# Patient Record
Sex: FEMALE | Race: WHITE | Employment: OTHER | URBAN - NONMETROPOLITAN AREA
[De-identification: names, ages, dates, MRNs, and addresses within clinical notes are randomized per-mention and may not be internally consistent; named-entity substitution may affect disease eponyms.]

---

## 2017-04-04 ENCOUNTER — HOSPITAL ENCOUNTER (OUTPATIENT)
Dept: WOMENS IMAGING | Age: 47
Discharge: HOME | End: 2017-04-04
Payer: COMMERCIAL

## 2017-04-04 DIAGNOSIS — Z12.31 VISIT FOR SCREENING MAMMOGRAM: ICD-10-CM

## 2017-04-04 PROCEDURE — G0202 SCR MAMMO BI INCL CAD: HCPCS

## 2017-04-05 ENCOUNTER — TELEPHONE (OUTPATIENT)
Dept: WOMENS IMAGING | Age: 47
End: 2017-04-05

## 2017-04-07 ENCOUNTER — HOSPITAL ENCOUNTER (OUTPATIENT)
Dept: ULTRASOUND IMAGING | Age: 47
Discharge: HOME | End: 2017-04-07
Payer: COMMERCIAL

## 2017-04-07 DIAGNOSIS — R92.2 INCONCLUSIVE MAMMOGRAM: ICD-10-CM

## 2017-04-07 PROCEDURE — 76642 ULTRASOUND BREAST LIMITED: CPT

## 2017-10-11 ENCOUNTER — TELEPHONE (OUTPATIENT)
Dept: UROLOGY | Age: 47
End: 2017-10-11

## 2017-10-13 ENCOUNTER — OFFICE VISIT (OUTPATIENT)
Dept: UROLOGY | Age: 47
End: 2017-10-13
Payer: COMMERCIAL

## 2017-10-13 VITALS
BODY MASS INDEX: 25.88 KG/M2 | TEMPERATURE: 97.3 F | HEIGHT: 66 IN | OXYGEN SATURATION: 97 % | HEART RATE: 103 BPM | WEIGHT: 161 LBS

## 2017-10-13 DIAGNOSIS — R30.0 DYSURIA: ICD-10-CM

## 2017-10-13 DIAGNOSIS — Z87.440 HISTORY OF RECURRENT UTIS: Primary | ICD-10-CM

## 2017-10-13 LAB
APPEARANCE FLUID: CLEAR
BILIRUBIN, POC: 0
BLOOD URINE, POC: NORMAL
CLARITY, POC: CLEAR
COLOR, POC: YELLOW
GLUCOSE URINE, POC: NORMAL
KETONES, POC: NORMAL
LEUKOCYTE EST, POC: NORMAL
NITRITE, POC: NORMAL
PH, POC: 6
PROTEIN, POC: NORMAL
SPECIFIC GRAVITY, POC: 1.02
UROBILINOGEN, POC: 0.2

## 2017-10-13 PROCEDURE — 81003 URINALYSIS AUTO W/O SCOPE: CPT | Performed by: PHYSICIAN ASSISTANT

## 2017-10-13 PROCEDURE — 99214 OFFICE O/P EST MOD 30 MIN: CPT | Performed by: PHYSICIAN ASSISTANT

## 2017-10-13 RX ORDER — PHENAZOPYRIDINE HYDROCHLORIDE 100 MG/1
100 TABLET, FILM COATED ORAL 3 TIMES DAILY PRN
Qty: 21 TABLET | Refills: 1 | Status: SHIPPED | OUTPATIENT
Start: 2017-10-13 | End: 2017-10-20

## 2017-10-13 ASSESSMENT — ENCOUNTER SYMPTOMS
NAUSEA: 0
WHEEZING: 0
SHORTNESS OF BREATH: 0
SORE THROAT: 0
DOUBLE VISION: 0
HEARTBURN: 0
BLURRED VISION: 0

## 2017-10-13 NOTE — PROGRESS NOTES
Mouth/Throat: No oropharyngeal exudate. Eyes: Left eye exhibits no discharge. No scleral icterus. Neck: No JVD present. No tracheal deviation present. No thyromegaly present. Cardiovascular: Exam reveals no gallop and no friction rub. Pulmonary/Chest: No stridor. She has no rales. Abdominal: She exhibits no distension and no mass. There is no rebound and no guarding. Musculoskeletal: She exhibits no edema. Neurological: No cranial nerve deficit. She exhibits normal muscle tone. Coordination normal.   Skin: She is not diaphoretic. No pallor. DATA:  U/A:    Lab Results   Component Value Date    NITRITE neg 10/13/2017    COLORU yellow 10/13/2017    PROTEINU neg' 10/13/2017    PROTEINU Negative 12/19/2016    PHUR 6.0 10/13/2017    PHUR 5.5 12/19/2016    CLARITYU clear 10/13/2017    SPECGRAV 1.025 10/13/2017    SPECGRAV 1.020 12/19/2016    LEUKOCYTESUR neg 10/13/2017    UROBILINOGEN Normal 12/19/2016    BILIRUBINUR 0 10/13/2017    BLOODU neg 10/13/2017    BLOODU Negative 12/19/2016    GLUCOSEU neg 10/13/2017    GLUCOSEU n 12/19/2016             1. History of recurrent UTIs  Patient normally takes postcoital antibiotics which has been effective for her recurrent UTIs. Feels that she has infection at this time we'll send urine for culture and sensitivity she had taken 1-2 date dose of 2 Macrobid daily. Patient follow up pending culture return in 2 months.  - POCT Urinalysis no Micro  - Urine Culture    2. Dysuria  Treated with Pyridium. Orders Placed This Encounter   Procedures    Urine Culture     Order Specific Question:   Specify (ex-cath, midstream, cysto, etc)?      Answer:   uti    POCT Urinalysis no Micro     Orders Placed This Encounter   Medications    phenazopyridine (PYRIDIUM) 100 MG tablet     Sig: Take 1 tablet by mouth 3 times daily as needed for Pain     Dispense:  21 tablet     Refill:  1     Plan:  Patient will return in 2 months or when necessary pending culture results.

## 2017-12-18 ENCOUNTER — OFFICE VISIT (OUTPATIENT)
Dept: UROLOGY | Age: 47
End: 2017-12-18
Payer: COMMERCIAL

## 2017-12-18 VITALS
HEIGHT: 66 IN | HEART RATE: 112 BPM | TEMPERATURE: 98 F | BODY MASS INDEX: 25.71 KG/M2 | DIASTOLIC BLOOD PRESSURE: 81 MMHG | SYSTOLIC BLOOD PRESSURE: 117 MMHG | WEIGHT: 160 LBS

## 2017-12-18 DIAGNOSIS — Z87.440 HISTORY OF RECURRENT UTIS: Primary | ICD-10-CM

## 2017-12-18 LAB
BACTERIA URINE, POC: NORMAL
BILIRUBIN URINE: 0 MG/DL
BLOOD, URINE: NEGATIVE
CASTS URINE, POC: NORMAL
CLARITY: NORMAL
COLOR: NORMAL
CRYSTALS URINE, POC: NORMAL
EPI CELLS URINE, POC: NORMAL
GLUCOSE URINE: NORMAL
KETONES, URINE: NEGATIVE
LEUKOCYTE EST, POC: NORMAL
NITRITE, URINE: NEGATIVE
PH UA: 6 (ref 4.5–8)
PROTEIN UA: NEGATIVE
RBC URINE, POC: NORMAL
SPECIFIC GRAVITY UA: 1.02 (ref 1–1.03)
UROBILINOGEN, URINE: NORMAL
WBC URINE, POC: NORMAL
YEAST URINE, POC: NORMAL

## 2017-12-18 PROCEDURE — 81000 URINALYSIS NONAUTO W/SCOPE: CPT | Performed by: NURSE PRACTITIONER

## 2017-12-18 PROCEDURE — 99212 OFFICE O/P EST SF 10 MIN: CPT | Performed by: NURSE PRACTITIONER

## 2017-12-18 ASSESSMENT — ENCOUNTER SYMPTOMS
NAUSEA: 0
EYE REDNESS: 0
EYE DISCHARGE: 0
WHEEZING: 0
HEARTBURN: 0
PHOTOPHOBIA: 0
DIARRHEA: 0
COUGH: 0
ABDOMINAL PAIN: 0
VOMITING: 0
TROUBLE SWALLOWING: 0
SHORTNESS OF BREATH: 0
CONSTIPATION: 0

## 2017-12-18 NOTE — PROGRESS NOTES
Radha Hartmann is a 41 y.o. female who presents today   Chief Complaint   Patient presents with    Follow-up     I am here today for my yearly check up. History of Recurrent UTI's:  Patient is here today for Recurrent UTI's follow up.  which started approximately 10 years(s) ago. The last infection was  2 month(s) ago. The patient has had 2 infections the past year. During the infections, the patient has experienced the following symptoms: dysuria, frequency, urgency, suprapubic pressure  Fever and chills? no fever or chills  Related to sexually intercourse? Yes  Previous urine C&S results: in the past  Current Rx for recurrent UTI's: Macrobid (postcoital)  Previous treatments tried for UTI's: macrobid  Symptoms improve with treatment: yes    Lower urinary tract symptoms: None currently          Past Medical History:   Diagnosis Date    Asthma     Cervical disc displacement     para-cervical disc protrusion c5-c6    Chronic UTI (urinary tract infection)     Eczema     Elevated white blood cell count     Endometriosis     Fibromyalgia     Hyperlipidemia     Hypertriglyceridemia     IBS (irritable bowel syndrome)     Meniere disease     Migraine     Rapid heart beat     Sciatica        Past Surgical History:   Procedure Laterality Date    CHOLECYSTECTOMY      ENDOMETRIAL ABLATION      OTHER SURGICAL HISTORY      cyst near tailbone removed       Current Outpatient Prescriptions   Medication Sig Dispense Refill    folic acid (FOLVITE) 1 MG tablet       vitamin D (CHOLECALCIFEROL) 1000 UNIT TABS tablet Take 1,000 Units by mouth daily      Multiple Vitamins-Minerals (THERAPEUTIC MULTIVITAMIN-MINERALS) tablet Take 1 tablet by mouth daily      nitrofurantoin (MACRODANTIN) 100 MG capsule Take 1 capsule by mouth nightly 30 capsule 5    zolpidem (AMBIEN) 10 MG tablet Take 10 mg by mouth nightly as needed.  imipramine (TOFRANIL) 25 MG tablet Take 25 mg by mouth nightly.         vitamin B-12 (CYANOCOBALAMIN) 1000 MCG tablet Take 1,000 mcg by mouth daily.  metformin (GLUCOPHAGE) 500 MG tablet Take 500 mg by mouth 2 times daily (with meals).  ALBUTEROL IN Inhale  into the lungs.  cyclobenzaprine (FLEXERIL) 10 MG tablet Take 10 mg by mouth 3 times daily as needed. No current facility-administered medications for this visit. Allergies   Allergen Reactions    Actifed [Triprolidine-Pse]     Codimal Dh     Levaquin [Levofloxacin In D5w] Itching    Percocet [Oxycodone-Acetaminophen]        Social History     Social History    Marital status:      Spouse name: N/A    Number of children: N/A    Years of education: N/A     Social History Main Topics    Smoking status: Never Smoker    Smokeless tobacco: Never Used    Alcohol use No    Drug use: No    Sexual activity: Not Asked     Other Topics Concern    None     Social History Narrative    None       Family History   Problem Relation Age of Onset    Cancer Mother      breast    Heart Disease Father     Arthritis Other        REVIEW OF SYSTEMS:  Review of Systems   Constitutional: Negative for chills, fatigue and fever. HENT: Negative for hearing loss and trouble swallowing. Eyes: Negative for photophobia, discharge and visual disturbance. Respiratory: Negative for cough, shortness of breath and wheezing. Cardiovascular: Negative for chest pain and palpitations. Gastrointestinal: Negative for abdominal pain, constipation, diarrhea and vomiting. Endocrine: Negative for polydipsia and polyuria. Genitourinary: Negative for decreased urine volume, difficulty urinating, dyspareunia, dysuria, enuresis, flank pain, frequency, hematuria, pelvic pain, urgency, vaginal discharge and vaginal pain. Musculoskeletal: Negative for joint swelling, neck pain and neck stiffness. Skin: Negative for rash and wound. Allergic/Immunologic: Negative for environmental allergies and food allergies. Urine Negative     pH, UA 6.0 4.5 - 8.0    Protein, UA Negative Negative    Nitrite, Urine Negative     Leukocytes, UA Small     Urobilinogen, Urine Normal     rbc urine, poc      wbc urine, poc      bacteria urine, poc      yeast urine, poc      casts urine, poc      epi cells urine, poc      crystals urine, poc     wbc microscope. ..... 2    Problem List Items Addressed This Visit     History of recurrent UTIs - Primary    Relevant Orders    POC URINE with Microscopic (Completed)      Other Visit Diagnoses    None. Orders Placed This Encounter   Procedures    POC URINE with Microscopic        No Follow-up on file.

## 2017-12-18 NOTE — PROGRESS NOTES
Rocio Ho is a 41 y.o. female who presents today   Chief Complaint   Patient presents with    Follow-up     I am here today for my yearly check up. Patient presents with her 3 month follow up for recurrent uti related to intercourse. Past Medical History:   Diagnosis Date    Asthma     Cervical disc displacement     para-cervical disc protrusion c5-c6    Chronic UTI (urinary tract infection)     Eczema     Elevated white blood cell count     Endometriosis     Fibromyalgia     Hyperlipidemia     Hypertriglyceridemia     IBS (irritable bowel syndrome)     Meniere disease     Migraine     Rapid heart beat     Sciatica        Past Surgical History:   Procedure Laterality Date    CHOLECYSTECTOMY      ENDOMETRIAL ABLATION      OTHER SURGICAL HISTORY      cyst near tailbone removed       Current Outpatient Prescriptions   Medication Sig Dispense Refill    folic acid (FOLVITE) 1 MG tablet       vitamin D (CHOLECALCIFEROL) 1000 UNIT TABS tablet Take 1,000 Units by mouth daily      Multiple Vitamins-Minerals (THERAPEUTIC MULTIVITAMIN-MINERALS) tablet Take 1 tablet by mouth daily      nitrofurantoin (MACRODANTIN) 100 MG capsule Take 1 capsule by mouth nightly 30 capsule 5    zolpidem (AMBIEN) 10 MG tablet Take 10 mg by mouth nightly as needed.  imipramine (TOFRANIL) 25 MG tablet Take 25 mg by mouth nightly.  vitamin B-12 (CYANOCOBALAMIN) 1000 MCG tablet Take 1,000 mcg by mouth daily.  metformin (GLUCOPHAGE) 500 MG tablet Take 500 mg by mouth 2 times daily (with meals).  ALBUTEROL IN Inhale  into the lungs.  cyclobenzaprine (FLEXERIL) 10 MG tablet Take 10 mg by mouth 3 times daily as needed. No current facility-administered medications for this visit.         Allergies   Allergen Reactions    Actifed [Triprolidine-Pse]     Codimal Dh     Levaquin [Levofloxacin In D5w] Itching    Percocet [Oxycodone-Acetaminophen]        Social History     Social History    Marital status:      Spouse name: N/A    Number of children: N/A    Years of education: N/A     Social History Main Topics    Smoking status: Never Smoker    Smokeless tobacco: Never Used    Alcohol use No    Drug use: No    Sexual activity: Not Asked     Other Topics Concern    None     Social History Narrative    None       Family History   Problem Relation Age of Onset    Cancer Mother      breast    Heart Disease Father     Arthritis Other        REVIEW OF SYSTEMS:  Review of Systems   Constitutional: Negative for chills and fever. HENT: Negative for hearing loss. Eyes: Negative for discharge and redness. Respiratory: Negative for shortness of breath and wheezing. Cardiovascular: Negative for leg swelling and PND. Gastrointestinal: Negative for heartburn and nausea. Genitourinary: Negative for dysuria, flank pain, frequency, hematuria and urgency. Musculoskeletal: Negative for myalgias and neck pain. Skin: Negative for itching and rash. Neurological: Negative for seizures, loss of consciousness and headaches. Endo/Heme/Allergies: Negative for environmental allergies and polydipsia. Psychiatric/Behavioral: Negative for depression and suicidal ideas. PHYSICAL EXAM:  /81   Pulse 112   Temp 98 °F (36.7 °C) (Temporal)   Ht 5' 6\" (1.676 m)   Wt 160 lb (72.6 kg)   BMI 25.82 kg/m²   Physical Exam   Constitutional: She is oriented to person, place, and time. She appears well-developed and well-nourished. No distress. HENT:   Head: Normocephalic and atraumatic. Eyes: Conjunctivae are normal. Pupils are equal, round, and reactive to light. Neck: Neck supple. No JVD present. No thyromegaly present. Cardiovascular: Normal rate and regular rhythm. Exam reveals no friction rub. No murmur heard. Pulmonary/Chest: Breath sounds normal. No respiratory distress. She has no wheezes. Abdominal: Soft. She exhibits no distension.  There is no

## 2017-12-22 RX ORDER — NITROFURANTOIN MACROCRYSTALS 100 MG/1
100 CAPSULE ORAL NIGHTLY
Qty: 30 CAPSULE | Refills: 5 | Status: SHIPPED | OUTPATIENT
Start: 2017-12-22 | End: 2018-08-12 | Stop reason: ALTCHOICE

## 2018-01-11 ENCOUNTER — OFFICE VISIT (OUTPATIENT)
Dept: OBGYN | Age: 48
End: 2018-01-11
Payer: COMMERCIAL

## 2018-01-11 VITALS
HEIGHT: 66 IN | DIASTOLIC BLOOD PRESSURE: 77 MMHG | WEIGHT: 156 LBS | BODY MASS INDEX: 25.07 KG/M2 | SYSTOLIC BLOOD PRESSURE: 108 MMHG | HEART RATE: 106 BPM

## 2018-01-11 DIAGNOSIS — Z01.419 WELL WOMAN EXAM WITH ROUTINE GYNECOLOGICAL EXAM: Primary | ICD-10-CM

## 2018-01-11 DIAGNOSIS — Z12.31 ENCOUNTER FOR SCREENING MAMMOGRAM FOR BREAST CANCER: ICD-10-CM

## 2018-01-11 DIAGNOSIS — Z12.4 SCREENING FOR CERVICAL CANCER: ICD-10-CM

## 2018-01-11 PROCEDURE — 99386 PREV VISIT NEW AGE 40-64: CPT | Performed by: NURSE PRACTITIONER

## 2018-01-11 ASSESSMENT — ENCOUNTER SYMPTOMS
EYES NEGATIVE: 1
ALLERGIC/IMMUNOLOGIC NEGATIVE: 1
GASTROINTESTINAL NEGATIVE: 1
RESPIRATORY NEGATIVE: 1

## 2018-01-11 NOTE — PROGRESS NOTES
 OTHER SURGICAL HISTORY      cyst near tailbone removed     Family History   Problem Relation Age of Onset    Cancer Mother      breast    Heart Disease Father     Arthritis Other      Social History   Substance Use Topics    Smoking status: Never Smoker    Smokeless tobacco: Never Used    Alcohol use No       Review of Systems   Constitutional: Negative. HENT: Negative. Eyes: Negative. Respiratory: Negative. Cardiovascular: Negative. Gastrointestinal: Negative. Endocrine: Negative. Genitourinary: Negative. Musculoskeletal: Negative. Skin: Negative. Allergic/Immunologic: Negative. Neurological: Negative. Hematological: Negative. Psychiatric/Behavioral: Negative. Objective:   Physical Exam   Constitutional: She is oriented to person, place, and time. She appears well-developed and well-nourished. Eyes: Conjunctivae are normal. Right eye exhibits no discharge. Neck: No thyroid mass and no thyromegaly present. Cardiovascular: Normal rate, regular rhythm and normal heart sounds. No murmur heard. Pulmonary/Chest: Effort normal and breath sounds normal. She has no wheezes. Right breast exhibits no inverted nipple, no mass, no nipple discharge, no skin change and no tenderness. Left breast exhibits no inverted nipple, no mass, no nipple discharge, no skin change and no tenderness. Breasts are symmetrical.   Abdominal: Soft. Bowel sounds are normal. She exhibits no distension and no mass. There is no tenderness. Hernia confirmed negative in the right inguinal area and confirmed negative in the left inguinal area. Genitourinary: Rectal exam shows no external hemorrhoid. No breast swelling, tenderness or discharge. There is no rash, tenderness or lesion on the right labia. There is no rash, tenderness or lesion on the left labia. Uterus is not enlarged and not tender. Cervix exhibits no motion tenderness and no discharge (normal cervical mucosa).  Right adnexum

## 2018-04-17 ENCOUNTER — OFFICE VISIT (OUTPATIENT)
Dept: UROLOGY | Age: 48
End: 2018-04-17
Payer: COMMERCIAL

## 2018-04-17 VITALS
SYSTOLIC BLOOD PRESSURE: 147 MMHG | DIASTOLIC BLOOD PRESSURE: 84 MMHG | TEMPERATURE: 97.2 F | WEIGHT: 157 LBS | BODY MASS INDEX: 25.23 KG/M2 | HEIGHT: 66 IN | HEART RATE: 105 BPM

## 2018-04-17 DIAGNOSIS — R10.2 SUPRAPUBIC PAIN: Primary | ICD-10-CM

## 2018-04-17 DIAGNOSIS — N39.0 URINARY TRACT INFECTION WITHOUT HEMATURIA, SITE UNSPECIFIED: ICD-10-CM

## 2018-04-17 LAB
BACTERIA URINE, POC: ABNORMAL
BILIRUBIN URINE: 0 MG/DL
BLOOD, URINE: POSITIVE
CASTS URINE, POC: ABNORMAL
CLARITY: CLEAR
COLOR: YELLOW
CRYSTALS URINE, POC: ABNORMAL
EPI CELLS URINE, POC: ABNORMAL
GLUCOSE URINE: ABNORMAL
KETONES, URINE: NEGATIVE
LEUKOCYTE EST, POC: ABNORMAL
NITRITE, URINE: POSITIVE
PH UA: 5.5 (ref 4.5–8)
PROTEIN UA: NEGATIVE
RBC URINE, POC: ABNORMAL
SPECIFIC GRAVITY UA: 1.03 (ref 1–1.03)
UROBILINOGEN, URINE: NORMAL
WBC URINE, POC: ABNORMAL
YEAST URINE, POC: ABNORMAL

## 2018-04-17 PROCEDURE — 99214 OFFICE O/P EST MOD 30 MIN: CPT | Performed by: PHYSICIAN ASSISTANT

## 2018-04-17 PROCEDURE — 81001 URINALYSIS AUTO W/SCOPE: CPT | Performed by: PHYSICIAN ASSISTANT

## 2018-04-17 RX ORDER — SULFAMETHOXAZOLE AND TRIMETHOPRIM 800; 160 MG/1; MG/1
1 TABLET ORAL 2 TIMES DAILY
Qty: 20 TABLET | Refills: 0 | Status: SHIPPED | OUTPATIENT
Start: 2018-04-17 | End: 2018-04-27

## 2018-04-17 RX ORDER — LETROZOLE 2.5 MG/1
TABLET, FILM COATED ORAL
COMMUNITY
Start: 2018-04-16 | End: 2018-08-12 | Stop reason: ALTCHOICE

## 2018-04-17 ASSESSMENT — ENCOUNTER SYMPTOMS
ABDOMINAL PAIN: 0
BLURRED VISION: 0
SHORTNESS OF BREATH: 0
SINUS PAIN: 0
BACK PAIN: 0
EYE PAIN: 0
WHEEZING: 0
VOMITING: 0

## 2018-04-19 LAB
ORGANISM: ABNORMAL
URINE CULTURE, ROUTINE: ABNORMAL
URINE CULTURE, ROUTINE: ABNORMAL

## 2018-05-02 ENCOUNTER — OFFICE VISIT (OUTPATIENT)
Dept: UROLOGY | Age: 48
End: 2018-05-02
Payer: COMMERCIAL

## 2018-05-02 VITALS
HEART RATE: 109 BPM | HEIGHT: 66 IN | DIASTOLIC BLOOD PRESSURE: 73 MMHG | WEIGHT: 158 LBS | SYSTOLIC BLOOD PRESSURE: 114 MMHG | BODY MASS INDEX: 25.39 KG/M2

## 2018-05-02 DIAGNOSIS — N39.0 URINARY TRACT INFECTION WITHOUT HEMATURIA, SITE UNSPECIFIED: Primary | ICD-10-CM

## 2018-05-02 LAB
APPEARANCE FLUID: NORMAL
BILIRUBIN, POC: NORMAL
BLOOD URINE, POC: NORMAL
CLARITY, POC: CLEAR
COLOR, POC: YELLOW
GLUCOSE URINE, POC: NORMAL
KETONES, POC: NORMAL
LEUKOCYTE EST, POC: NORMAL
NITRITE, POC: NORMAL
PH, POC: 5.5
PROTEIN, POC: NORMAL
SPECIFIC GRAVITY, POC: 1.02
UROBILINOGEN, POC: 0.2

## 2018-05-02 PROCEDURE — 81003 URINALYSIS AUTO W/O SCOPE: CPT | Performed by: PHYSICIAN ASSISTANT

## 2018-05-02 PROCEDURE — 99213 OFFICE O/P EST LOW 20 MIN: CPT | Performed by: PHYSICIAN ASSISTANT

## 2018-05-02 ASSESSMENT — ENCOUNTER SYMPTOMS
HEARTBURN: 0
DOUBLE VISION: 0
SHORTNESS OF BREATH: 0
WHEEZING: 0
SORE THROAT: 0
BLURRED VISION: 0
NAUSEA: 0

## 2018-07-19 ENCOUNTER — HOSPITAL ENCOUNTER (OUTPATIENT)
Dept: WOMENS IMAGING | Age: 48
Discharge: HOME | End: 2018-07-19
Payer: COMMERCIAL

## 2018-07-19 DIAGNOSIS — Z12.31 ENCOUNTER FOR SCREENING MAMMOGRAM FOR BREAST CANCER: ICD-10-CM

## 2018-07-19 PROCEDURE — 77063 BREAST TOMOSYNTHESIS BI: CPT

## 2018-08-12 ENCOUNTER — HOSPITAL ENCOUNTER (EMERGENCY)
Age: 48
Discharge: HOME | End: 2018-08-12
Attending: EMERGENCY MEDICINE
Payer: COMMERCIAL

## 2018-08-12 ENCOUNTER — APPOINTMENT (OUTPATIENT)
Dept: CT IMAGING | Age: 48
End: 2018-08-12
Payer: COMMERCIAL

## 2018-08-12 ENCOUNTER — APPOINTMENT (OUTPATIENT)
Dept: GENERAL RADIOLOGY | Age: 48
End: 2018-08-12
Payer: COMMERCIAL

## 2018-08-12 VITALS
OXYGEN SATURATION: 100 % | BODY MASS INDEX: 24.91 KG/M2 | HEART RATE: 84 BPM | SYSTOLIC BLOOD PRESSURE: 144 MMHG | TEMPERATURE: 97.9 F | HEIGHT: 66 IN | RESPIRATION RATE: 20 BRPM | WEIGHT: 155 LBS | DIASTOLIC BLOOD PRESSURE: 86 MMHG

## 2018-08-12 DIAGNOSIS — R79.89 ELEVATED LFTS: ICD-10-CM

## 2018-08-12 DIAGNOSIS — R10.13 ABDOMINAL PAIN, EPIGASTRIC: Primary | ICD-10-CM

## 2018-08-12 DIAGNOSIS — R91.1 PULMONARY NODULE SEEN ON IMAGING STUDY: ICD-10-CM

## 2018-08-12 LAB
ALBUMIN SERPL-MCNC: 4.3 G/DL (ref 3.5–5.2)
ALP BLD-CCNC: 78 U/L (ref 35–104)
ALT SERPL-CCNC: 107 U/L (ref 5–33)
ANION GAP SERPL CALCULATED.3IONS-SCNC: 16 MMOL/L (ref 7–19)
AST SERPL-CCNC: 264 U/L (ref 5–32)
BACTERIA: NEGATIVE /HPF
BASOPHILS ABSOLUTE: 0.1 K/UL (ref 0–0.2)
BASOPHILS RELATIVE PERCENT: 0.7 % (ref 0–1)
BILIRUB SERPL-MCNC: 0.4 MG/DL (ref 0.2–1.2)
BILIRUBIN URINE: NEGATIVE
BLOOD, URINE: NEGATIVE
BUN BLDV-MCNC: 17 MG/DL (ref 6–20)
CALCIUM SERPL-MCNC: 9.7 MG/DL (ref 8.6–10)
CHLORIDE BLD-SCNC: 98 MMOL/L (ref 98–111)
CLARITY: CLEAR
CO2: 23 MMOL/L (ref 22–29)
COLOR: YELLOW
CREAT SERPL-MCNC: 0.6 MG/DL (ref 0.5–0.9)
EOSINOPHILS ABSOLUTE: 0.2 K/UL (ref 0–0.6)
EOSINOPHILS RELATIVE PERCENT: 0.9 % (ref 0–5)
EPITHELIAL CELLS, UA: 6 /HPF (ref 0–5)
GFR NON-AFRICAN AMERICAN: >60
GLUCOSE BLD-MCNC: 102 MG/DL (ref 74–109)
GLUCOSE URINE: NEGATIVE MG/DL
HCG(URINE) PREGNANCY TEST: NEGATIVE
HCT VFR BLD CALC: 39.3 % (ref 37–47)
HEMOGLOBIN: 13.4 G/DL (ref 12–16)
HYALINE CASTS: 0 /HPF (ref 0–8)
INR BLD: 0.88 (ref 0.88–1.18)
KETONES, URINE: NEGATIVE MG/DL
LEUKOCYTE ESTERASE, URINE: ABNORMAL
LIPASE: 38 U/L (ref 13–60)
LYMPHOCYTES ABSOLUTE: 3.7 K/UL (ref 1.1–4.5)
LYMPHOCYTES RELATIVE PERCENT: 21.6 % (ref 20–40)
MCH RBC QN AUTO: 31.2 PG (ref 27–31)
MCHC RBC AUTO-ENTMCNC: 34.1 G/DL (ref 33–37)
MCV RBC AUTO: 91.4 FL (ref 81–99)
MONOCYTES ABSOLUTE: 1 K/UL (ref 0–0.9)
MONOCYTES RELATIVE PERCENT: 6.2 % (ref 0–10)
NEUTROPHILS ABSOLUTE: 11.8 K/UL (ref 1.5–7.5)
NEUTROPHILS RELATIVE PERCENT: 69.9 % (ref 50–65)
NITRITE, URINE: NEGATIVE
PDW BLD-RTO: 11.8 % (ref 11.5–14.5)
PH UA: 7
PLATELET # BLD: 400 K/UL (ref 130–400)
PMV BLD AUTO: 9.2 FL (ref 9.4–12.3)
POTASSIUM SERPL-SCNC: 4.1 MMOL/L (ref 3.5–5)
PROTEIN UA: NEGATIVE MG/DL
PROTHROMBIN TIME: 11.8 SEC (ref 12–14.6)
RBC # BLD: 4.3 M/UL (ref 4.2–5.4)
RBC UA: 2 /HPF (ref 0–4)
SODIUM BLD-SCNC: 137 MMOL/L (ref 136–145)
SPECIFIC GRAVITY UA: 1.01
TOTAL PROTEIN: 7.5 G/DL (ref 6.6–8.7)
TROPONIN: <0.01 NG/ML (ref 0–0.03)
TROPONIN: <0.01 NG/ML (ref 0–0.03)
URINE REFLEX TO CULTURE: YES
UROBILINOGEN, URINE: 0.2 E.U./DL
WBC # BLD: 16.9 K/UL (ref 4.8–10.8)
WBC UA: 9 /HPF (ref 0–5)

## 2018-08-12 PROCEDURE — 6360000004 HC RX CONTRAST MEDICATION: Performed by: EMERGENCY MEDICINE

## 2018-08-12 PROCEDURE — 96375 TX/PRO/DX INJ NEW DRUG ADDON: CPT

## 2018-08-12 PROCEDURE — 6360000002 HC RX W HCPCS: Performed by: EMERGENCY MEDICINE

## 2018-08-12 PROCEDURE — 96374 THER/PROPH/DIAG INJ IV PUSH: CPT

## 2018-08-12 PROCEDURE — 36415 COLL VENOUS BLD VENIPUNCTURE: CPT

## 2018-08-12 PROCEDURE — 81025 URINE PREGNANCY TEST: CPT

## 2018-08-12 PROCEDURE — 85025 COMPLETE CBC W/AUTO DIFF WBC: CPT

## 2018-08-12 PROCEDURE — 6370000000 HC RX 637 (ALT 250 FOR IP): Performed by: EMERGENCY MEDICINE

## 2018-08-12 PROCEDURE — 85610 PROTHROMBIN TIME: CPT

## 2018-08-12 PROCEDURE — 99284 EMERGENCY DEPT VISIT MOD MDM: CPT | Performed by: EMERGENCY MEDICINE

## 2018-08-12 PROCEDURE — C9113 INJ PANTOPRAZOLE SODIUM, VIA: HCPCS | Performed by: EMERGENCY MEDICINE

## 2018-08-12 PROCEDURE — 81001 URINALYSIS AUTO W/SCOPE: CPT

## 2018-08-12 PROCEDURE — 2580000003 HC RX 258: Performed by: EMERGENCY MEDICINE

## 2018-08-12 PROCEDURE — 87086 URINE CULTURE/COLONY COUNT: CPT

## 2018-08-12 PROCEDURE — 71045 X-RAY EXAM CHEST 1 VIEW: CPT

## 2018-08-12 PROCEDURE — 93005 ELECTROCARDIOGRAM TRACING: CPT

## 2018-08-12 PROCEDURE — 83690 ASSAY OF LIPASE: CPT

## 2018-08-12 PROCEDURE — 99285 EMERGENCY DEPT VISIT HI MDM: CPT

## 2018-08-12 PROCEDURE — 84484 ASSAY OF TROPONIN QUANT: CPT

## 2018-08-12 PROCEDURE — 74177 CT ABD & PELVIS W/CONTRAST: CPT

## 2018-08-12 PROCEDURE — 87186 SC STD MICRODIL/AGAR DIL: CPT

## 2018-08-12 PROCEDURE — 87077 CULTURE AEROBIC IDENTIFY: CPT

## 2018-08-12 PROCEDURE — 80053 COMPREHEN METABOLIC PANEL: CPT

## 2018-08-12 RX ORDER — PROMETHAZINE HYDROCHLORIDE 25 MG/ML
12.5 INJECTION, SOLUTION INTRAMUSCULAR; INTRAVENOUS ONCE
Status: COMPLETED | OUTPATIENT
Start: 2018-08-12 | End: 2018-08-12

## 2018-08-12 RX ORDER — MORPHINE SULFATE 1 MG/ML
4 INJECTION, SOLUTION EPIDURAL; INTRATHECAL; INTRAVENOUS ONCE
Status: COMPLETED | OUTPATIENT
Start: 2018-08-12 | End: 2018-08-12

## 2018-08-12 RX ORDER — SUCRALFATE 1 G/1
1 TABLET ORAL ONCE
Status: COMPLETED | OUTPATIENT
Start: 2018-08-12 | End: 2018-08-12

## 2018-08-12 RX ORDER — PANTOPRAZOLE SODIUM 40 MG/10ML
40 INJECTION, POWDER, LYOPHILIZED, FOR SOLUTION INTRAVENOUS ONCE
Status: COMPLETED | OUTPATIENT
Start: 2018-08-12 | End: 2018-08-12

## 2018-08-12 RX ORDER — ONDANSETRON 2 MG/ML
4 INJECTION INTRAMUSCULAR; INTRAVENOUS ONCE
Status: COMPLETED | OUTPATIENT
Start: 2018-08-12 | End: 2018-08-12

## 2018-08-12 RX ORDER — PROMETHAZINE HYDROCHLORIDE 25 MG/ML
12.5 INJECTION, SOLUTION INTRAMUSCULAR; INTRAVENOUS ONCE
Status: DISCONTINUED | OUTPATIENT
Start: 2018-08-12 | End: 2018-08-12

## 2018-08-12 RX ORDER — 0.9 % SODIUM CHLORIDE 0.9 %
1000 INTRAVENOUS SOLUTION INTRAVENOUS ONCE
Status: COMPLETED | OUTPATIENT
Start: 2018-08-12 | End: 2018-08-12

## 2018-08-12 RX ADMIN — Medication 4 MG: at 06:26

## 2018-08-12 RX ADMIN — SUCRALFATE 1 G: 1 TABLET ORAL at 07:00

## 2018-08-12 RX ADMIN — PROMETHAZINE HYDROCHLORIDE 12.5 MG: 25 INJECTION INTRAMUSCULAR; INTRAVENOUS at 07:11

## 2018-08-12 RX ADMIN — PANTOPRAZOLE SODIUM 40 MG: 40 INJECTION, POWDER, FOR SOLUTION INTRAVENOUS at 05:32

## 2018-08-12 RX ADMIN — SODIUM CHLORIDE 1000 ML: 9 INJECTION, SOLUTION INTRAVENOUS at 05:33

## 2018-08-12 RX ADMIN — Medication 4 MG: at 05:24

## 2018-08-12 RX ADMIN — ONDANSETRON 4 MG: 2 INJECTION INTRAMUSCULAR; INTRAVENOUS at 05:24

## 2018-08-12 RX ADMIN — IOPAMIDOL 90 ML: 755 INJECTION, SOLUTION INTRAVENOUS at 07:40

## 2018-08-12 RX ADMIN — LIDOCAINE HYDROCHLORIDE: 20 SOLUTION ORAL; TOPICAL at 05:25

## 2018-08-12 ASSESSMENT — PAIN SCALES - GENERAL
PAINLEVEL_OUTOF10: 9
PAINLEVEL_OUTOF10: 3
PAINLEVEL_OUTOF10: 7
PAINLEVEL_OUTOF10: 7

## 2018-08-12 ASSESSMENT — ENCOUNTER SYMPTOMS
BACK PAIN: 0
ABDOMINAL PAIN: 1
SHORTNESS OF BREATH: 0

## 2018-08-12 NOTE — ED PROVIDER NOTES
[facility]  eMERGENCY dEPARTMENT eNCOUnter      Pt Name: Florian Gates  MRN: [id number]  [place] 1970  Date of evaluation: 8/12/2018  Provider: Rosario George MD    CHIEF COMPLAINT       Chief Complaint   Patient presents with    Chest Pain         HISTORY OF PRESENT ILLNESS   (Location/Symptom, Timing/Onset, Context/Setting, Quality, Duration, Modifying Factors, Severity)  Note limiting factors. Florian Gates is a 41 y.o. female who presents to the emergency department with lower esophageal spasm and pain. 0130 prior to going to bed. Drank water 4 glasses. Ate at 1900. Hx of esophageal spasms, feels like things sit on stomach. Severe intermittent epigastric pain, hx of choleycystectomy in 1999. Chronic issues with BMs and constipation and slow gastric transit. No food bolus impaction hx. The history is provided by the patient and the spouse. Nursing Notes were reviewed. REVIEW OF SYSTEMS    (2-9 systems for level 4, 10 or more for level 5)     Review of Systems   Constitutional: Negative for fever. Respiratory: Negative for shortness of breath. Cardiovascular: Positive for chest pain. Gastrointestinal: Positive for abdominal pain. Genitourinary: Negative for dysuria. Musculoskeletal: Negative for back pain. Neurological: Negative for headaches. Psychiatric/Behavioral: Negative for confusion. A complete review of systems was performed and is negative except as noted above in the HPI.        PAST MEDICAL HISTORY     Past Medical History:   Diagnosis Date    Asthma     Cervical disc displacement     para-cervical disc protrusion c5-c6    Chronic UTI (urinary tract infection)     Eczema     Elevated white blood cell count     Endometriosis     Fibromyalgia     Hyperlipidemia     Hypertriglyceridemia     IBS (irritable bowel syndrome)     Meniere disease     Migraine     Rapid heart beat     Sciatica          SURGICAL HISTORY       Past Surgical

## 2018-08-12 NOTE — ED PROVIDER NOTES
[facility]  eMERGENCY dEPARTMENT eNCOUnter      Pt Name: Venancio Ellington  MRN: [id number]  [place] 1970  Date of evaluation: 8/12/2018  Provider: Mey Villalba MD    Emergency Department care of this patient was assumed at 0700 from Dr. Theresa Carty. We have discussed the case and the plan of care. I have seen and evaluated patient and reviewed ED course. CHIEF COMPLAINT       Chief Complaint   Patient presents with    Chest Pain     Patient currently awaiting CT scan of the abdomen for further evaluation of symptoms. PHYSICAL EXAM    (up to 7 for level 4, 8 or more for level 5)     ED Triage Vitals   BP Temp Temp Source Pulse Resp SpO2 Height Weight   08/12/18 0437 08/12/18 0432 08/12/18 0432 08/12/18 0437 08/12/18 0432 08/12/18 0437 08/12/18 0432 08/12/18 0432   (!) 138/103 97.9 °F (36.6 °C) Oral 115 24 100 % 5' 6\" (1.676 m) 155 lb (70.3 kg)       Physical Exam    DIAGNOSTIC RESULTS       RADIOLOGY:   Non-plain film images such as CT, Ultrasound and MRI are read by the radiologist. Plain radiographic images are visualized and preliminarily interpreted by the emergency physician with the below findings:      CT ABDOMEN PELVIS W IV CONTRAST Additional Contrast? Oral   Final Result   Impression:   1. No acute findings in the abdomen or pelvis. 2. Atherosclerotic disease. Signed by Dr Charly Kraft on 8/12/2018 8:05 AM      XR CHEST PORTABLE   Final Result   No evidence of acute cardiopulmonary process. Questionable   nodule in the right upper lung field for which nonemergent CT chest is   recommended.    Signed by Dr Charly Kraft on 8/12/2018 7:48 AM              LABS:  Labs Reviewed   COMPREHENSIVE METABOLIC PANEL - Abnormal; Notable for the following:        Result Value     (*)      (*)     All other components within normal limits   CBC WITH AUTO DIFFERENTIAL - Abnormal; Notable for the following:     WBC 16.9 (*)     MCH 31.2 (*)     MPV 9.2 (*)     Neutrophils % 69.9 (*)

## 2018-08-15 LAB
EKG P AXIS: 62 DEGREES
EKG P AXIS: 95 DEGREES
EKG P-R INTERVAL: 144 MS
EKG P-R INTERVAL: 150 MS
EKG Q-T INTERVAL: 348 MS
EKG Q-T INTERVAL: 356 MS
EKG QRS DURATION: 116 MS
EKG QRS DURATION: 94 MS
EKG QTC CALCULATION (BAZETT): 431 MS
EKG QTC CALCULATION (BAZETT): 437 MS
EKG T AXIS: 103 DEGREES
EKG T AXIS: 57 DEGREES
ORGANISM: ABNORMAL
URINE CULTURE, ROUTINE: ABNORMAL
URINE CULTURE, ROUTINE: ABNORMAL

## 2018-09-14 ENCOUNTER — HOSPITAL ENCOUNTER (OUTPATIENT)
Dept: CT IMAGING | Age: 48
Discharge: HOME | End: 2018-09-14
Payer: COMMERCIAL

## 2018-09-14 DIAGNOSIS — R91.1 LUNG NODULE: ICD-10-CM

## 2018-09-14 PROCEDURE — 71260 CT THORAX DX C+: CPT

## 2018-09-14 PROCEDURE — 6360000004 HC RX CONTRAST MEDICATION: Performed by: FAMILY MEDICINE

## 2018-09-14 RX ADMIN — IOVERSOL 60 ML: 741 INJECTION INTRA-ARTERIAL; INTRAVENOUS at 16:02

## 2018-11-02 ENCOUNTER — OFFICE VISIT (OUTPATIENT)
Dept: UROLOGY | Age: 48
End: 2018-11-02
Payer: COMMERCIAL

## 2018-11-02 VITALS — HEIGHT: 66 IN | TEMPERATURE: 97.2 F | BODY MASS INDEX: 24.75 KG/M2 | WEIGHT: 154 LBS

## 2018-11-02 DIAGNOSIS — Z87.440 HISTORY OF RECURRENT UTIS: Primary | ICD-10-CM

## 2018-11-02 LAB
BILIRUBIN, POC: NORMAL
BLOOD URINE, POC: NORMAL
CLARITY, POC: CLEAR
COLOR, POC: YELLOW
GLUCOSE URINE, POC: NORMAL
KETONES, POC: NORMAL
LEUKOCYTE EST, POC: NORMAL
NITRITE, POC: NORMAL
PH, POC: 7
PROTEIN, POC: NORMAL
SPECIFIC GRAVITY, POC: 1.01
UROBILINOGEN, POC: 0.2

## 2018-11-02 PROCEDURE — 81003 URINALYSIS AUTO W/O SCOPE: CPT | Performed by: PHYSICIAN ASSISTANT

## 2018-11-02 PROCEDURE — 99213 OFFICE O/P EST LOW 20 MIN: CPT | Performed by: PHYSICIAN ASSISTANT

## 2018-11-02 RX ORDER — NITROFURANTOIN MACROCRYSTALS 50 MG/1
50 CAPSULE ORAL 4 TIMES DAILY
COMMUNITY
End: 2019-01-03 | Stop reason: ALTCHOICE

## 2018-11-02 RX ORDER — NITROFURANTOIN 25; 75 MG/1; MG/1
100 CAPSULE ORAL 2 TIMES DAILY
Qty: 30 CAPSULE | Refills: 5 | Status: SHIPPED | OUTPATIENT
Start: 2018-11-02 | End: 2018-11-17

## 2018-11-02 ASSESSMENT — ENCOUNTER SYMPTOMS
SHORTNESS OF BREATH: 0
ABDOMINAL PAIN: 0
VOMITING: 0
BACK PAIN: 0
SINUS PAIN: 0
EYE PAIN: 0
WHEEZING: 0

## 2018-11-02 NOTE — PROGRESS NOTES
Take 500 mg by mouth 2 times daily (with meals).  ALBUTEROL IN Inhale  into the lungs. No current facility-administered medications for this visit. Allergies   Allergen Reactions    Actifed [Triprolidine-Pse]     Codimal Dh     Levaquin [Levofloxacin In D5w] Itching    Percocet [Oxycodone-Acetaminophen]        Social History     Social History    Marital status:      Spouse name: N/A    Number of children: N/A    Years of education: N/A     Social History Main Topics    Smoking status: Never Smoker    Smokeless tobacco: Never Used    Alcohol use No    Drug use: No    Sexual activity: Not on file     Other Topics Concern    Not on file     Social History Narrative    No narrative on file       Family History   Problem Relation Age of Onset    Cancer Mother         breast    Heart Disease Father     Arthritis Other        REVIEW OF SYSTEMS:  Review of Systems   HENT: Negative for nosebleeds and sinus pain. Eyes: Negative for pain. Respiratory: Negative for shortness of breath and wheezing. Cardiovascular: Negative for palpitations and leg swelling. Gastrointestinal: Negative for abdominal pain and vomiting. Endocrine: Negative for polydipsia. Genitourinary: Negative for dysuria, flank pain, frequency, hematuria and urgency. Musculoskeletal: Negative for back pain and myalgias. Skin: Negative for rash. Allergic/Immunologic: Negative for environmental allergies. Neurological: Negative for tremors. Psychiatric/Behavioral: Negative for hallucinations. The patient is not nervous/anxious. PHYSICAL EXAM:  Temp 97.2 °F (36.2 °C) (Temporal)   Ht 5' 6\" (1.676 m)   Wt 154 lb (69.9 kg)   BMI 24.86 kg/m²   Physical Exam   Constitutional: No distress. HENT:   Mouth/Throat: No oropharyngeal exudate. Eyes: Left eye exhibits no discharge. No scleral icterus. Neck: No JVD present. No tracheal deviation present. No thyromegaly present.

## 2019-01-03 ENCOUNTER — OFFICE VISIT (OUTPATIENT)
Dept: URGENT CARE | Age: 49
End: 2019-01-03
Payer: COMMERCIAL

## 2019-01-03 VITALS
SYSTOLIC BLOOD PRESSURE: 110 MMHG | BODY MASS INDEX: 23.89 KG/M2 | HEART RATE: 104 BPM | TEMPERATURE: 99.1 F | OXYGEN SATURATION: 97 % | WEIGHT: 148 LBS | RESPIRATION RATE: 18 BRPM | DIASTOLIC BLOOD PRESSURE: 72 MMHG

## 2019-01-03 DIAGNOSIS — J03.90 TONSILLITIS: Primary | ICD-10-CM

## 2019-01-03 DIAGNOSIS — J02.9 SORE THROAT: ICD-10-CM

## 2019-01-03 LAB — S PYO AG THROAT QL: NORMAL

## 2019-01-03 PROCEDURE — 99202 OFFICE O/P NEW SF 15 MIN: CPT | Performed by: NURSE PRACTITIONER

## 2019-01-03 PROCEDURE — 87880 STREP A ASSAY W/OPTIC: CPT | Performed by: NURSE PRACTITIONER

## 2019-01-03 RX ORDER — AMOXICILLIN 500 MG/1
500 CAPSULE ORAL 2 TIMES DAILY
Qty: 20 CAPSULE | Refills: 0 | Status: SHIPPED | OUTPATIENT
Start: 2019-01-03 | End: 2019-01-13

## 2019-01-03 ASSESSMENT — ENCOUNTER SYMPTOMS
COUGH: 0
VOICE CHANGE: 1
SORE THROAT: 1
VOMITING: 0
NAUSEA: 0
ABDOMINAL PAIN: 0
RHINORRHEA: 0

## 2019-01-10 ENCOUNTER — HOSPITAL ENCOUNTER (OUTPATIENT)
Dept: GENERAL RADIOLOGY | Age: 49
Discharge: HOME | End: 2019-01-10
Payer: COMMERCIAL

## 2019-01-10 DIAGNOSIS — R05.9 COUGH: ICD-10-CM

## 2019-01-10 PROCEDURE — 71046 X-RAY EXAM CHEST 2 VIEWS: CPT

## 2019-02-01 ENCOUNTER — TELEPHONE (OUTPATIENT)
Dept: UROLOGY | Age: 49
End: 2019-02-01

## 2019-02-01 LAB — MONO TEST: NEGATIVE

## 2019-04-18 LAB
ALBUMIN SERPL-MCNC: 4.6 G/DL (ref 3.5–5.2)
ALP BLD-CCNC: 92 U/L (ref 35–104)
ALT SERPL-CCNC: 10 U/L (ref 5–33)
ANION GAP SERPL CALCULATED.3IONS-SCNC: 15 MMOL/L (ref 7–19)
AST SERPL-CCNC: 19 U/L (ref 5–32)
BASOPHILS ABSOLUTE: 0.1 K/UL (ref 0–0.2)
BASOPHILS RELATIVE PERCENT: 1.2 % (ref 0–1)
BILIRUB SERPL-MCNC: 0.4 MG/DL (ref 0.2–1.2)
BUN BLDV-MCNC: 14 MG/DL (ref 6–20)
CALCIUM SERPL-MCNC: 10.2 MG/DL (ref 8.6–10)
CHLORIDE BLD-SCNC: 96 MMOL/L (ref 98–111)
CHOLESTEROL, TOTAL: 237 MG/DL (ref 160–199)
CO2: 27 MMOL/L (ref 22–29)
CREAT SERPL-MCNC: 0.6 MG/DL (ref 0.5–0.9)
EOSINOPHILS ABSOLUTE: 0.2 K/UL (ref 0–0.6)
EOSINOPHILS RELATIVE PERCENT: 1.8 % (ref 0–5)
GFR NON-AFRICAN AMERICAN: >60
GLUCOSE BLD-MCNC: 91 MG/DL (ref 74–109)
HCT VFR BLD CALC: 44.7 % (ref 37–47)
HDLC SERPL-MCNC: 72 MG/DL (ref 65–121)
HEMOGLOBIN: 14.7 G/DL (ref 12–16)
LDL CHOLESTEROL CALCULATED: 142 MG/DL
LYMPHOCYTES ABSOLUTE: 3.6 K/UL (ref 1.1–4.5)
LYMPHOCYTES RELATIVE PERCENT: 34.8 % (ref 20–40)
MCH RBC QN AUTO: 31.8 PG (ref 27–31)
MCHC RBC AUTO-ENTMCNC: 32.9 G/DL (ref 33–37)
MCV RBC AUTO: 96.8 FL (ref 81–99)
MONOCYTES ABSOLUTE: 0.6 K/UL (ref 0–0.9)
MONOCYTES RELATIVE PERCENT: 5.9 % (ref 0–10)
NEUTROPHILS ABSOLUTE: 5.8 K/UL (ref 1.5–7.5)
NEUTROPHILS RELATIVE PERCENT: 55.9 % (ref 50–65)
PDW BLD-RTO: 11.9 % (ref 11.5–14.5)
PLATELET # BLD: 476 K/UL (ref 130–400)
PMV BLD AUTO: 9.7 FL (ref 9.4–12.3)
POTASSIUM SERPL-SCNC: 4.5 MMOL/L (ref 3.5–5)
RBC # BLD: 4.62 M/UL (ref 4.2–5.4)
SODIUM BLD-SCNC: 138 MMOL/L (ref 136–145)
T4 FREE: 1.1 NG/DL (ref 0.9–1.7)
TOTAL PROTEIN: 7.9 G/DL (ref 6.6–8.7)
TRIGL SERPL-MCNC: 113 MG/DL (ref 0–149)
TSH SERPL DL<=0.05 MIU/L-ACNC: 1.39 UIU/ML (ref 0.27–4.2)
WBC # BLD: 10.3 K/UL (ref 4.8–10.8)

## 2019-06-26 ENCOUNTER — OFFICE VISIT (OUTPATIENT)
Dept: OTOLARYNGOLOGY | Facility: CLINIC | Age: 49
End: 2019-06-26

## 2019-06-26 ENCOUNTER — TELEPHONE (OUTPATIENT)
Dept: OTOLARYNGOLOGY | Facility: CLINIC | Age: 49
End: 2019-06-26

## 2019-06-26 VITALS
BODY MASS INDEX: 24.27 KG/M2 | TEMPERATURE: 97.9 F | HEART RATE: 70 BPM | DIASTOLIC BLOOD PRESSURE: 85 MMHG | SYSTOLIC BLOOD PRESSURE: 130 MMHG | WEIGHT: 151 LBS | HEIGHT: 66 IN

## 2019-06-26 DIAGNOSIS — R49.0 DYSPHONIA: Primary | ICD-10-CM

## 2019-06-26 DIAGNOSIS — R49.8 VOICE STRAIN: ICD-10-CM

## 2019-06-26 PROCEDURE — 99203 OFFICE O/P NEW LOW 30 MIN: CPT | Performed by: OTOLARYNGOLOGY

## 2019-06-26 PROCEDURE — 31575 DIAGNOSTIC LARYNGOSCOPY: CPT | Performed by: OTOLARYNGOLOGY

## 2019-06-26 RX ORDER — ALBUTEROL SULFATE 90 UG/1
AEROSOL, METERED RESPIRATORY (INHALATION) EVERY 4 HOURS
COMMUNITY
End: 2022-10-27 | Stop reason: SDUPTHER

## 2019-06-26 RX ORDER — ZOLPIDEM TARTRATE 6.25 MG/1
TABLET, FILM COATED, EXTENDED RELEASE ORAL NIGHTLY
COMMUNITY
Start: 2019-06-24

## 2019-06-26 RX ORDER — LANOLIN ALCOHOL/MO/W.PET/CERES
1000 CREAM (GRAM) TOPICAL
COMMUNITY

## 2019-06-26 RX ORDER — M-VIT,TX,IRON,MINS/CALC/FOLIC 27MG-0.4MG
1 TABLET ORAL
COMMUNITY

## 2019-06-26 RX ORDER — IMIPRAMINE HCL 25 MG
25 TABLET ORAL NIGHTLY
COMMUNITY
Start: 2019-06-24

## 2019-06-26 RX ORDER — ERGOCALCIFEROL 1.25 MG/1
CAPSULE ORAL
COMMUNITY
Start: 2019-05-20

## 2019-06-26 NOTE — PROGRESS NOTES
Kia Blas MA   Patient Intake Note    Review of Systems  Review of Systems   Constitutional: Negative.    HENT: Positive for voice change.         SEE HPI   Eyes: Negative.    Respiratory: Negative.    Cardiovascular: Negative.    Gastrointestinal: Negative.    Endocrine: Negative.    Genitourinary: Negative.    Musculoskeletal: Negative.    Allergic/Immunologic: Negative.    Neurological: Positive for headaches.   Hematological: Negative.    Psychiatric/Behavioral: Negative.        QUALITY MEASURES    Tobacco Use: Screening and Cessation Intervention  Social History    Tobacco Use      Smoking status: Never Smoker      Smokeless tobacco: Never Used        Kia Blas MA  6/26/2019  10:59 AM

## 2019-06-26 NOTE — PROGRESS NOTES
Kike Olivarez Jr, MD     Chief Complaint   Patient presents with   • Hoarse        History of Present Illness  Accompanied by:     HISTORY:   Mariann Portillo is a  48 y.o. female who has been hoarse since Jan. She was sick then. Treated with abx. She will lose voice almost completely lose voice. Not normal since an.  She has pain with speaking.  No hemoptytsis.  No weight loss..  No rx since Jan.  No scope done.  Non smoker  Nondrinker  Denies DENIS  Avoids spicy foods      Review of Systems  Reviewed per patient intake note and confirmed with patient    Past History:  Past Medical History:   Diagnosis Date   • Allergic rhinitis    • Asthma    • Chronic UTI    • Endometriosis    • Fibromyalgia    • High cholesterol    • IBS (irritable bowel syndrome)    • Meniere disease    • Migraines      Past Surgical History:   Procedure Laterality Date   • CYST REMOVAL     • ENDOMETRIAL BIOPSY     • GALLBLADDER SURGERY       Family History   Problem Relation Age of Onset   • Cancer Mother      Social History     Tobacco Use   • Smoking status: Never Smoker   • Smokeless tobacco: Never Used   Substance Use Topics   • Alcohol use: No     Frequency: Never   • Drug use: Defer     Outpatient Medications Marked as Taking for the 6/26/19 encounter (Office Visit) with Kike Olivarez Jr., MD   Medication Sig Dispense Refill   • albuterol sulfate HFA (PROAIR HFA) 108 (90 Base) MCG/ACT inhaler Every 4 (Four) Hours.     • Cholecalciferol (VITAMIN D) 1000 units tablet Take 1,000 Units by mouth.     • imipramine (TOFRANIL) 25 MG tablet      • metFORMIN (GLUCOPHAGE) 850 MG tablet      • therapeutic multivitamin-minerals (THERAGRAN-M) tablet Take 1 tablet by mouth.     • vitamin B-12 (CYANOCOBALAMIN) 1000 MCG tablet Take 1,000 mcg by mouth.     • vitamin D (ERGOCALCIFEROL) 90354 units capsule capsule      • zolpidem CR (AMBIEN CR) 6.25 MG CR tablet        Allergies:  Codimal-a [brompheniramine] and Percocet  [oxycodone-acetaminophen]        Vital Signs:   Temp:  [97.9 °F (36.6 °C)] 97.9 °F (36.6 °C)  Heart Rate:  [70] 70  BP: (130)/(85) 130/85    EXAMINATION:   CONSTITUTIONAL: well nourished, well-developed, alert, oriented, in no acute distress   normal weight for height  COMMUNICATION AND VOICE: able to communicate normally, hoarse vocal quality  Normocephalic, without obvious abnormality  atraumatic  structure normal, no tenderness on palpation, no lesions, no evidence of trauma  SALIVARY GLANDS: parotid glands with no tenderness, no swelling, no masses, submandibular glands with normal size, nontender  BILATERAL: intact  EYES: ocular motility normal, eyelids normal, orbits normal, no proptosis, conjunctiva normal, sclera non-icteric, pupils equal, round   brown   HEARING: Response to conversational voice normal bilaterally  external ears normal to inspection and palpation, no tenderness or erythema  canals clear, normal cerumen and skin, without drainage, skin intact and healthy  tympanic membranes clear, normal landmarks and light reflex  normal light reflex and landmarks  middle ear without fluid, ossicular chain intact  no mastoid process tenderness     APPEARANCE: normal, straight, with good projection, no tenderness, no lesions, no tenderness  ABNORMAL: pale, mod edema of mucosa, turbinates pael and mildly enlarged, Septum wide with DNS L to R low mild  LIPS: structure normal, no tenderness on palpation, no lesions, no evidence of trauma, normal mobility and oral competence  TEETH: dentition within normal limits for age  GUMS: gingivae healthy, no lesions  ORAL MUCOSA: oral mucosa normal, no mucosal lesions  FLOOR OF MOUTH: Warthin's duct patent, mucosa normal  TONGUE: lingual mucosa normal without lesions, normal tongue mobility  OROPHARYNX: oropharyngeal mucosa normal, tonsil fossa with normal appearance  bilateral  NECK: normal appearance, no masses, no lesions, larynx normal mobility, trachea midline  LYMPH  NODES:  no adenopathy  THYROID: no overt thyromegaly, no tenderness, nodules or mass present on palpation, position midline   CHEST/RESPIRATORY: respiratory effort normal, no rales, rubs or wheezing  CARDIOVASCULAR: regular rate and rhythm, no murmurs, gallups, no peripheral edema  NEUROLOGIC/PSYCHIATRIC: oriented appropriately for age, mood normal, affect appropriate, cranial nerves intact grossly unless specifically mentioned            Assessment    Diagnosis Plan   1. Dysphonia  Ambulatory Referral to Speech Therapy   2. Voice strain  Ambulatory Referral to Speech Therapy       Plan    Conservative management.  Voice hygiene  SLP referral for voice strain     Orders Placed This Encounter   Procedures   • Ambulatory Referral to Speech Therapy            Return in about 6 weeks (around 8/7/2019) for Recheck vocal tension.    Kike Olivarez Jr, MD  06/26/19  12:13 PM

## 2019-06-26 NOTE — TELEPHONE ENCOUNTER
Pt has been scheduled with [facility] Out patent speech July 23, 2019  at 8:30. Pt notified of appt date/time.

## 2019-06-26 NOTE — PATIENT INSTRUCTIONS
VOICE HYGIENE:    Many factors go into manufacturing what we call the voice. More goes on than just breathing out and using the voice box and mouth. Creating the voice requires good lung function, a healthy voice box, and complex muscle coordination. Any malfunction in any of the systems and voice problems can occur.    The most common problem seen in the office is hoarseness. Other types of voice problems can include breathiness, double voice, raspy voice, or simply a change in the pitch. Many things can cause a hoarse or weak voice, including laryngitis, vocal abuse (screaming, cheering, singing too loud for too long), smoking, etc. In order to treat the problem, your physician first must find the cause, then try to correct it to restore your normal voice.    The first part of a voice analysis is a detailed history of the problem, including any habits such as smoking, to delineate possible factors. The second step is a careful examination of the head and neck, including the voice box. The examination is essential, as this eliminates any anatomic problems with the vocal cords and the surrounding structures. More detailed analysis may be used in the form of videotaping the vocal cords with a stroboscopic light, thus providing additional information.     Suggestions for Voice Use and Conservation    Treatment of voice problems depends on the cause of the problems. Fortunately, most problems are easily correctable. The following are a few suggestions you will be asked to carry out, depending on the results of your examination.    >Avoid stress  >Avoid habitually clearing throat as this strains and fatigues the vocal cords  >Avoid yelling, especially for extended periods of time  >Avoid falsetto singing  >Avoid talking when it hurts or if your neck is excessively tired.  >Do exercise regularly to maintain fitness and breathe control. Do eat correctly, to avoid acid reflux  >Drink 6-8 glasses of water daily to keep the  vocal cords flexible, making it easier to speak  >Do take sips of water while speaking as swallowing helps relax the muscles of the throat and neck  >Do sip water, sniff or say the letter “H” forcefully rather than clearing throat  >Do listen to your voice as you speak to avoid trailing off or “tightness” at the end of sentences  >Do use plenty of breath while speaking. If you feel you are running out of breath, stop, breathe then continue.  >Do speak easily rather than pushing, forcing or straining  >Do stop talking and take a voice break, especially if you are fading or fatiguing  >Do increase your awareness of your voice. When does it sound better, worse? >Recording your voice or watching yourself speak in a mirror to watch for movement in the neck and shoulders can be beneficial.    Whispering is not a protective mechanism for the voice. In fact, whispering can make your condition worse. In order to limit further damage to your voice, maintain a normal volume and tone. Only in certain circumstances will this recommendation require modification. Dr. Olivarez will tell you if that is the case.    Complete voice rest is used occasionally to treat voice and vocal cord problems. However, this is a rare situation. Dr. Olivarez will tell you if this is indicated.    A great deal of voice hygiene is based on common sense. If it hurts to talk, then a problem exists.    Vocal Strengthening Exercises    >Sit in a comfortable and upright position. The chair should have a hard back and hard arm rests.  >Rest your hands on the arms of the chair. Push down with both of your hands at the same time. Do not use too much shoulder movement, simply push with your hands. You should feel a tightening of your abdominal muscles, not your neck muscles.  >After you become comfortable with the pushing motion, begin to make a short sound (such as at/ ah) each time you push down. You can substitute other short sounds such as a vowel sound or  counting.  >Be sure to take a deep breath before each push down and vocalization of the sound.  Summary of exercise sequence:   Take a deep breath in  Push down while saying ah  Do NOT hold your breath  Relax and breathe out  Repeat above sequence 15-20 times every hour or several times each day.    Speech Pathology    Frequently, physicians and speech pathologists treat voice disorders together. These are highly trained individuals who specialize in therapy for the voice box and throat. You may recognize the better as speech, swallowing or voice therapists. Dr. Olivarez may elect to refer you for evaluation and treatment, depending on your diagnosis and condition.    Special Testing    You may be referred certain tests to fully evaluate your throat, swallowing or voice. These may include CAT scans, swallowing tests, MRIs, or videostroboscopy. You may not be familiar these tests but the results will discussed them with you at the time of your office visit and answer your questions about them.    Referral Speech for vocal cord tension

## 2019-06-26 NOTE — PROGRESS NOTES
Kike Olivarez Jr, MD     ENT Procedure Note    Anesthesia: topical 4% tetracaine and oxymetazoline mix    Endoscopy Type: Flexible Laryngoscopy    Indications for Procedure: Voice disorder    Procedure Details:    The patient was placed in the sitting position.  The 4 mm laryngoscope was passed.  The nasal cavities, nasopharynx, oropharynx, hypopharynx, and larynx were all examined.  Vocal cords were examined during respiration and phonation.  The following findings were noted:    Findings: normal appearance of the epiglottis, arytenoids, and true vocal cords, normal vocal cord mobility, no lesions present, no obstruction present, but there appears to be a vocal strain with full vocalization    Remaining exam normal    Condition:  Stable.  Patient tolerated procedure well.    Complications:  None    Kike Olivarez Jr, MD  06/26/19  12:08 PM

## 2019-07-01 ENCOUNTER — HOSPITAL ENCOUNTER (OUTPATIENT)
Dept: SPEECH THERAPY | Facility: HOSPITAL | Age: 49
Setting detail: THERAPIES SERIES
Discharge: HOME | End: 2019-07-01

## 2019-07-01 DIAGNOSIS — R49.0 MUSCLE TENSION DYSPHONIA: Primary | ICD-10-CM

## 2019-07-01 PROCEDURE — 92524 BEHAVRAL QUALIT ANALYS VOICE: CPT | Performed by: SPEECH-LANGUAGE PATHOLOGIST

## 2019-07-01 NOTE — THERAPY EVALUATION
Outpatient Speech Language Pathology   Adult Voice Initial Evaluation  [facility]     Patient Name: Mariann Portillo  : 1970  MRN: [id number]  Today's Date: 2019         Visit Date: 2019   There is no problem list on file for this patient.       Past Medical History:   Diagnosis Date   • Allergic rhinitis    • Asthma    • Chronic UTI    • Endometriosis    • Fibromyalgia    • High cholesterol    • IBS (irritable bowel syndrome)    • Meniere disease    • Migraines         Past Surgical History:   Procedure Laterality Date   • CYST REMOVAL     • ENDOMETRIAL BIOPSY     • GALLBLADDER SURGERY           Visit Dx:    ICD-10-CM ICD-9-CM   1. 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 History is significant for fibromyalgia, meniere disease, allergic rhinitis, and asthma. She had an illness this past January and has been hoarse with vocal strain and pain with talking since that time. She was seen by Dr. Olivarez, ENT for laryngeal endoscopy.  Findings were as follows: normal appearance of the epiglottis, arytenoids, and true vocal cords, normal vocal cord mobility, no lesions present, no obstruction present, but there appears to be a vocal strain with full vocalization. On examination today, the patient presented with hoarse voice characterized by low range, pitch and voicing breaks, and vocal padgett.  She drinks an adequate amount of water daily, is a non smoker, and does not engage in over vocal abuse. She does engage in mild throat clearing. She stated that she is alone much of the day so therefore gives her voice adequate rest. On the CAPE-V her voice was judged to be mild to moderately deviant. On the Voice Handicap Index (self rated) her score was in the moderate range. She complains of soreness with speaking. Direct voice therapy is warranted to address muscle tension dysphonia. Patient participated in establishing goals and treatment plan.     Thank you for this  referral.           Voice - 07/01/19 0800        General Voice History    Reflux History  No reflux reported   -KG    Alcohol Servings  None   -KG    Daily Water Intake  60-80oz water per day   -KG    Daily Caffeine Intake  One 18oz tea per day, occasional coffee   -KG    Tobacco History  Never smoker   -KG    Pulmonary Status  Other (comment) Asthma    Asthma  -KG    Typical Voice Use  Uses voice for ADL's   -KG    Vocal Abuse/Misuse  Throat clearing;Other (comment) Mild    Mild  -KG    Allergies  Seasonal    -KG       Voice Assessment    S/Z Ratio and Interpretation  4.14   -KG    Maximum Phonation Time and Interpretation  20 seconds, rought and hoarse througout    -KG    Pitch Glide Characteristics  able to glide up and down, vocal mcduffie in lower range, pitch breaks and hoarseness througout.    -KG    Pitch Range During Speech  WNL with hoarseness, pitch breaks and mcduffie. Patient feels it is lower in range than normal for her.     -KG    Crescendo/Decrescendo  able to perform but decreased loudness, vocal mcduffie, hoarseness througout.    -KG    Voice Features Observed  Glottal Mcduffie;Unstable Pitch;Fast Fatigability;Hoarseness;Strained/Strangled quality   -KG    Resonance Characteristics  WFL   -KG    Muscle Tension Observation  Jaw (comment);Neck (comment);Shoulder (comment)   -KG    Postural Alignment- Shoulders  Left shoulder high   -KG    Breath Support- At Rest  Diaphragmatic;Thoracic   -KG       Measures and Scales for Voice    Measures and Scales for Voice  Voice Handicap Index;CAPE-V   -KG       Voice Handicap Index    Functional Subtest Score  14   -KG    Physical Subtest Score  26   -KG    Emotional Subtest Score  4   -KG    Total Score  44   -KG       CAPE-V    Overall Severity  Moderately deviant   -KG    Roughness  Moderately deviant   -KG    Breathiness  Mildly deviant   -KG    Strain  Moderately deviant   -KG    Pitch  Mildly deviant   -KG    Loudness  Moderately deviant   -KG    Instability  Mildly deviant    -KG    Resonance  WNL   -KG       Findings/Education/Recommendations    Clinical Impression- Voice  Mild moderate voice disorder   -KG    Clinical Impression- PVFM  Does not appear to present with PVFM   -KG    Activity Limits and Participation Restrictions  Daily activities   -KG    Impact on Function- Voice  Restrictions in personal and social life   -KG    Education  Described results of evaluation;Patient/Family expressed understanding or results;Patient/Family participated in establishing goals and plan of treatment   -KG    Barriers to Learning  No barriers identified   -KG    Learning Motivation  Strong   -KG    Learning Assessment  Learning needs;Learning motivation;Learning preference;Learning readiness   -KG    Prognosis  Good (comment)   -KG    Recommendations  Treatment for voice disorder;Home program established for patient to follow   -KG      User Key  (r) = Recorded By, (t) = Taken By, (c) = Cosigned By    Initials Name Provider Type    Mariann Winchester CCC-SLP Speech and Language Pathologist                        OP SLP Education     Row Name 07/01/19 1540       Education    Barriers to Learning  No barriers identified  -KG    Education Provided  Described results of evaluation;Patient expressed understanding of evaluation;Patient participated in establishing goals and treatment plan  -KG    Assessed  Learning needs;Learning motivation;Learning preferences;Learning readiness  -KG    Learning Motivation  Strong  -KG    Learning Method  Explanation  -KG    Teaching Response  Verbalized understanding  -KG      User Key  (r) = Recorded By, (t) = Taken By, (c) = Cosigned By    Initials Name Effective Dates    Mariann Winchester CCC-SLP 02/05/19 -           SLP OP Goals     Row Name 07/01/19 0900          Goal Type Needed    Goal Type Needed  Voice  -KG        Subjective Comments    Subjective Comments  Patient seen for evaluation today.   -KG        Subjective Pain    Able to rate subjective  "pain?  yes  -KG     Pre-Treatment Pain Level  0  -KG     Post-Treatment Pain Level  0  -KG        Voice Goals    Voice LTG's  Patient will be able to engage in speech at the conversational level in all settings, using functional phonation, acceptable habitual pitch and balanced tone focus.  -KG     Patient will be able to engage in speech at the conversational level in all settings, using functional phonation, acceptable habitual pitch and balanced tone focus.  90%:;without cues  -KG     Status: Patient will be able to engage in speech at the conversational level in all settings, using functional phonation, acceptable habitual pitch and balanced tone focus.  New  -KG     Voice STG's  Patient will institute the vocal hygiene technique of taking short vocal \"naps\" during the day when voice feels tired;Patient will reduce hyperfunctional use of the vocal mechanism via laryngeal massage and/or other relaxation techniques for the external muscles of the neck, shoulders and chest;Patient will reduce hyperfunctional use of the vocal mechanism via reducing tension in the shoulders, neck, jaw, face, mouth, and pharynx through completion of exercises;Pt will demonstrate an understanding of the structures and functions of the phonatory/respiratory tract (respiration, phonation, resonance and articulation);Patient will reduce hyperfunctional use of voice by performing Vocal Function Exercises  -KG     Patient will institute the vocal hygiene technique of taking short vocal \"naps\" during the day when voice feels tired  90%:;without cues  -KG     Status: Patient will institute the vocal hygiene technique of taking short vocal \"naps\" during the day when voice feels tired  New  -KG     Patient will reduce hyperfunctional use of the vocal mechanism via laryngeal massage and/or other relaxation techniques for the external muscles of the neck, shoulders and chest  90%  -KG     Status: Patient will reduce hyperfunctional use of the " vocal mechanism via laryngeal massage and/or other relaxation techniques for the external muscles of the neck, shoulders and chest  New  -KG     Patient will reduce hyperfunctional use of the vocal mechanism via reducing tension in the shoulders, neck, jaw, face, mouth, and pharynx through completion of exercises  90%  -KG     Status: Patient will reduce hyperfunctional use of the vocal mechanism via reducing tension in the shoulders, neck, jaw, face, mouth, and pharynx through completion of exercises  New  -KG     Pt will demonstrate an understanding of the structures and functions of the phonatory/respiratory tract (respiration, phonation, resonance and articulation)  90%:;without cues  -KG     Status: Pt will demonstrate an understanding of the structures and functions of the phonatory/respiratory tract (respiration, phonation, resonance and articulation)  New  -KG     Patient will reduce hyperfunctional use of voice by performing Vocal Function Exercises  90%:  -KG     Status: Patient will reduce hyperfunctional use of voice by performing Vocal Function Exercises  New  -KG        SLP Time Calculation    SLP Goal Re-Cert Due Date  09/30/19  -KG       User Key  (r) = Recorded By, (t) = Taken By, (c) = Cosigned By    Initials Name Provider Type    KG Mariann Carrasco CCC-SLP Speech and Language Pathologist          OP SLP Assessment/Plan - 07/01/19 0800        SLP Assessment    Functional Problems  Voice   -KG    Impact on Function- Voice  Restrictions in personal and social life   -KG    Clinical Impression- Voice  Mild moderate voice disorder   -KG    Clinical Impression- PVFM  Does not appear to present with PVFM   -KG    SLP Diagnosis  Mild-moderate voice disorder, muscle tension dysphonia   -KG    Prognosis  Good (comment)   -KG    Patient/caregiver participated in establishment of treatment plan and goals  Yes   -KG    Patient would benefit from skilled therapy intervention  Yes   -KG       SLP Plan     Frequency  1x/ week   -KG    Duration  3-6 weeks   -KG    Planned CPT's?  SLP INDIVIDUAL SPEECH THERAPY: 01959   -KG    Expected Duration Therapy Session - minutes  30-45 minutes   -KG    Plan Comments  Initiate therapy and home program.    -KG      User Key  (r) = Recorded By, (t) = Taken By, (c) = Cosigned By    Initials Name Provider Type    Mariann Winchester CCC-SLP Speech and Language Pathologist             SLP Outcome Measures (last 72 hours)      SLP Outcome Measures     Row Name 07/01/19 1500             SLP Outcome Measures    Outcome Measure Used?  Adult NOMS  -KG         Adult FCM Scores    FCM Chosen  Voice  -KG      Voice FCM Score  5  -KG      Voice Primary Service Delivery Model FCM  Individual  -KG        User Key  (r) = Recorded By, (t) = Taken By, (c) = Cosigned By    Initials Name Effective Dates    Mariann Winchester CCC-SLP 02/05/19 -              Time Calculation:   SLP Start Time: 0800  SLP Stop Time: 0930  SLP Time Calculation (min): 90 min    Therapy Charges for Today     Code Description Service Date Service Provider Modifiers Qty    68569037361  [facility] VOICE AND RESONC 6 7/1/2019 Mariann Carrasco CCC-SLP GN 1                     Mariann Carrasco CCC-DEIDRA  7/1/2019

## 2019-07-10 ENCOUNTER — HOSPITAL ENCOUNTER (OUTPATIENT)
Dept: SPEECH THERAPY | Facility: HOSPITAL | Age: 49
Setting detail: THERAPIES SERIES
Discharge: HOME | End: 2019-07-10

## 2019-07-10 DIAGNOSIS — R49.0 MUSCLE TENSION DYSPHONIA: Primary | ICD-10-CM

## 2019-07-10 PROCEDURE — 92507 TX SP LANG VOICE COMM INDIV: CPT | Performed by: SPEECH-LANGUAGE PATHOLOGIST

## 2019-07-10 NOTE — THERAPY TREATMENT NOTE
"Outpatient Speech Language Pathology   Adult/Peds Voice Treatment Note  [facility]     Patient Name: Mariann Portillo  : 1970  MRN: [id number]  Today's Date: 7/10/2019           Visit Date: 07/10/2019    There is no problem list on file for this patient.      Visit Dx:    ICD-10-CM ICD-9-CM   1. 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 Patient given verbal and written instructions. Able to demonstrate.    -KG       SLP Plan    Plan Comments  Continue therapy and home program.    -KG      User Key  (r) = Recorded By, (t) = Taken By, (c) = Cosigned By    Initials Name Provider Type    Mariann Winchester, CCC-SLP Speech and Language Pathologist          SLP OP Goals     Row Name 07/10/19 1556          Goal Type Needed    Goal Type Needed  Voice  -KG        Subjective Comments    Subjective Comments  First therapy session today. Patient alert and cooperative.   -KG        Subjective Pain    Able to rate subjective pain?  yes  -KG     Pre-Treatment Pain Level  3  -KG     Post-Treatment Pain Level  3  -KG     Subjective Pain Comment  Throat sore. Hurts more when talking.  -KG        Voice Goals    Patient will be able to engage in speech at the conversational level in all settings, using functional phonation, acceptable habitual pitch and balanced tone focus.  90%:;without cues  -KG     Status: Patient will be able to engage in speech at the conversational level in all settings, using functional phonation, acceptable habitual pitch and balanced tone focus.  New  -KG     Comments: Patient will be able to engage in speech at the conversational level in all settings, using functional phonation, acceptable habitual pitch and balanced tone focus.  Goals initiated.   -KG     Patient will institute the vocal hygiene technique of taking short vocal \"naps\" " "during the day when voice feels tired  90%:;without cues  -KG     Status: Patient will institute the vocal hygiene technique of taking short vocal \"naps\" during the day when voice feels tired  New  -KG     Comments: Patient will institute the vocal hygiene technique of taking short vocal \"naps\" during the day when voice feels tired  Patient given instruction and written information.   -KG     Patient will reduce hyperfunctional use of the vocal mechanism via laryngeal massage and/or other relaxation techniques for the external muscles of the neck, shoulders and chest  90%  -KG     Status: Patient will reduce hyperfunctional use of the vocal mechanism via laryngeal massage and/or other relaxation techniques for the external muscles of the neck, shoulders and chest  New  -KG     Comments: Patient will reduce hyperfunctional use of the vocal mechanism via laryngeal massage and/or other relaxation techniques for the external muscles of the neck, shoulders and chest  Patient given written instruction on exercises.   -KG     Patient will reduce hyperfunctional use of the vocal mechanism via reducing tension in the shoulders, neck, jaw, face, mouth, and pharynx through completion of exercises  90%  -KG     Status: Patient will reduce hyperfunctional use of the vocal mechanism via reducing tension in the shoulders, neck, jaw, face, mouth, and pharynx through completion of exercises  New  -KG     Comments: Patient will reduce hyperfunctional use of the vocal mechanism via reducing tension in the shoulders, neck, jaw, face, mouth, and pharynx through completion of exercises  Patient given written instruction on exercises.   -KG     Pt will demonstrate an understanding of the structures and functions of the phonatory/respiratory tract (respiration, phonation, resonance and articulation)  90%:;without cues  -KG     Status: Pt will demonstrate an understanding of the structures and functions of the phonatory/respiratory tract " (respiration, phonation, resonance and articulation)  New  -KG     Comments: Pt will demonstrate an understanding of the structures and functions of the phonatory/respiratory tract (respiration, phonation, resonance and articulation)  Patient given verbal instruction.   -KG     Patient will reduce hyperfunctional use of voice by performing Vocal Function Exercises  90%:  -KG     Status: Patient will reduce hyperfunctional use of voice by performing Vocal Function Exercises  New  -KG     Comments: Patient will reduce hyperfunctional use of voice by performing Vocal Function Exercises  SLP modeled exercises and gave patient written instructions.   -KG        SLP Time Calculation    SLP Goal Re-Cert Due Date  09/30/19  -KG       User Key  (r) = Recorded By, (t) = Taken By, (c) = Cosigned By    Initials Name Provider Type    KG Mariann Carrasco CCC-SLP Speech and Language Pathologist          OP SLP Education     Row Name 07/10/19 0597       Education    Barriers to Learning  No barriers identified  -KG    Education Provided  Patient demonstrated recommended strategies  -KG    Assessed  Learning needs;Learning motivation;Learning preferences  -KG    Learning Motivation  Strong  -KG    Learning Method  Explanation;Demonstration;Written materials  -KG    Teaching Response  Verbalized understanding  -KG      User Key  (r) = Recorded By, (t) = Taken By, (c) = Cosigned By    Initials Name Effective Dates    KG Mariann Carrasco CCC-SLP 02/05/19 -                 Time Calculation:   SLP Start Time: 1550  SLP Stop Time: 1630  SLP Time Calculation (min): 40 min    Therapy Charges for Today     Code Description Service Date Service Provider Modifiers Qty    [id number]  ST TREATMENT SPEECH 3 7/10/2019 Mariann Carrasco CCC-SLP GN 1                     CHE Sharma  7/10/2019

## 2019-07-23 ENCOUNTER — APPOINTMENT (OUTPATIENT)
Dept: SPEECH THERAPY | Facility: HOSPITAL | Age: 49
End: 2019-07-23

## 2019-07-25 ENCOUNTER — HOSPITAL ENCOUNTER (OUTPATIENT)
Dept: SPEECH THERAPY | Facility: HOSPITAL | Age: 49
Setting detail: THERAPIES SERIES
Discharge: HOME | End: 2019-07-25

## 2019-07-25 DIAGNOSIS — R49.0 MUSCLE TENSION DYSPHONIA: Primary | ICD-10-CM

## 2019-07-25 PROCEDURE — 92507 TX SP LANG VOICE COMM INDIV: CPT | Performed by: SPEECH-LANGUAGE PATHOLOGIST

## 2019-07-25 NOTE — THERAPY TREATMENT NOTE
Outpatient Speech Language Pathology   Adult/Peds Voice Treatment Note  [facility]     Patient Name: Mariann Portillo  : 1970  MRN: [id number]  Today's Date: 2019           Visit Date: 2019    There is no problem list on file for this patient.      Visit Dx:    ICD-10-CM ICD-9-CM   1. Muscle tension dysphonia R49.0 784.42                         OP SLP Assessment/Plan - 19 1642        SLP Assessment    Functional Problems  Voice   -KG    Clinical Impression Comments  Patient able to demonstrate vocal relaxation and phonation exercises. She continues to c/o pain on voicing. Intermittently hoarse. Coughing throughtout therapy session.    -KG       SLP Plan    Plan Comments  Continue therapy and home program.    -KG      User Key  (r) = Recorded By, (t) = Taken By, (c) = Cosigned By    Initials Name Provider Type    Mariann Winchester CCC-SLP Speech and Language Pathologist          SLP OP Goals     Row Name 19 1423          Goal Type Needed    Goal Type Needed  Voice  -KG        Subjective Comments    Subjective Comments  Patient alert and able to participate. She did have fairly consistent coughing throughout the session. She complained of scent allergy aggravating her today and did use her asthma inhaler.    -KG        Subjective Pain    Able to rate subjective pain?  yes  -KG     Pre-Treatment Pain Level  0  -KG     Post-Treatment Pain Level  0  -KG     Subjective Pain Comment  Continues to have soreness on speaking.   -KG        Voice Goals    Patient will be able to engage in speech at the conversational level in all settings, using functional phonation, acceptable habitual pitch and balanced tone focus.  90%:;without cues  -KG     Status: Patient will be able to engage in speech at the conversational level in all settings, using functional phonation, acceptable habitual pitch and balanced tone focus.  Progressing as expected  -KG     Comments: Patient will be able to engage in  "speech at the conversational level in all settings, using functional phonation, acceptable habitual pitch and balanced tone focus.  Voice remains intermittently hoarse. She reports pain on doing bubble/straw voicing exercise.   -KG     Patient will institute the vocal hygiene technique of taking short vocal \"naps\" during the day when voice feels tired  90%:;without cues  -KG     Status: Patient will institute the vocal hygiene technique of taking short vocal \"naps\" during the day when voice feels tired  Progressing as expected  -KG     Comments: Patient will institute the vocal hygiene technique of taking short vocal \"naps\" during the day when voice feels tired  Patient is alone most of the day so she is able to rest her voice.   -KG     Patient will reduce hyperfunctional use of the vocal mechanism via laryngeal massage and/or other relaxation techniques for the external muscles of the neck, shoulders and chest  90%  -KG     Status: Patient will reduce hyperfunctional use of the vocal mechanism via laryngeal massage and/or other relaxation techniques for the external muscles of the neck, shoulders and chest  Progressing as expected  -KG     Comments: Patient will reduce hyperfunctional use of the vocal mechanism via laryngeal massage and/or other relaxation techniques for the external muscles of the neck, shoulders and chest  Stretching and relaxation exercise for neck and shoulders.   -KG     Patient will reduce hyperfunctional use of the vocal mechanism via reducing tension in the shoulders, neck, jaw, face, mouth, and pharynx through completion of exercises  90%  -KG     Status: Patient will reduce hyperfunctional use of the vocal mechanism via reducing tension in the shoulders, neck, jaw, face, mouth, and pharynx through completion of exercises  Progressing as expected  -KG     Comments: Patient will reduce hyperfunctional use of the vocal mechanism via reducing tension in the shoulders, neck, jaw, face, mouth, " and pharynx through completion of exercises  Easy onset voicing practice.   -KG     Pt will demonstrate an understanding of the structures and functions of the phonatory/respiratory tract (respiration, phonation, resonance and articulation)  90%:;without cues  -KG     Status: Pt will demonstrate an understanding of the structures and functions of the phonatory/respiratory tract (respiration, phonation, resonance and articulation)  Progressing as expected  -KG     Comments: Pt will demonstrate an understanding of the structures and functions of the phonatory/respiratory tract (respiration, phonation, resonance and articulation)  Patient given verbal instruction on easy voicing & given list of /h/ onset words for home practice.   -KG     Patient will reduce hyperfunctional use of voice by performing Vocal Function Exercises  90%:  -KG     Status: Patient will reduce hyperfunctional use of voice by performing Vocal Function Exercises  Progressing as expected  -KG     Comments: Patient will reduce hyperfunctional use of voice by performing Vocal Function Exercises  Patient is performing home exercises as given.   -KG        SLP Time Calculation    SLP Goal Re-Cert Due Date  09/30/19  -KG       User Key  (r) = Recorded By, (t) = Taken By, (c) = Cosigned By    Initials Name Provider Type    Mariann Winchester CCC-SLP Speech and Language Pathologist          OP SLP Education     Row Name 07/25/19 1643       Education    Barriers to Learning  No barriers identified  -KG    Education Provided  Patient demonstrated recommended strategies  -KG    Assessed  Learning needs;Learning motivation;Learning preferences;Learning readiness  -KG    Learning Motivation  Strong  -KG    Learning Method  Explanation  -KG    Teaching Response  Verbalized understanding  -KG      User Key  (r) = Recorded By, (t) = Taken By, (c) = Cosigned By    Initials Name Effective Dates    Mariann Winchester CCC-SLP 02/05/19 -                 Time  Calculation:   SLP Start Time: 1415  SLP Stop Time: 1500  SLP Time Calculation (min): 45 min    Therapy Charges for Today     Code Description Service Date Service Provider Modifiers Qty    [id number] [facility] TREATMENT SPEECH 3 7/25/2019 Mariann Carrasco, CCC-SLP GN 1                     Mariann Carrasco CCC-DEIDRA  7/25/2019

## 2019-07-31 ENCOUNTER — HOSPITAL ENCOUNTER (OUTPATIENT)
Dept: SPEECH THERAPY | Facility: HOSPITAL | Age: 49
Setting detail: THERAPIES SERIES
Discharge: HOME | End: 2019-07-31

## 2019-07-31 DIAGNOSIS — R49.0 MUSCLE TENSION DYSPHONIA: Primary | ICD-10-CM

## 2019-07-31 PROCEDURE — 92507 TX SP LANG VOICE COMM INDIV: CPT | Performed by: SPEECH-LANGUAGE PATHOLOGIST

## 2019-08-01 DIAGNOSIS — R49.0 DYSPHONIA: Primary | ICD-10-CM

## 2019-08-01 DIAGNOSIS — R49.8 VOICE STRAIN: ICD-10-CM

## 2019-08-07 ENCOUNTER — OFFICE VISIT (OUTPATIENT)
Dept: OTOLARYNGOLOGY | Facility: CLINIC | Age: 49
End: 2019-08-07

## 2019-08-07 VITALS
BODY MASS INDEX: 23.95 KG/M2 | DIASTOLIC BLOOD PRESSURE: 68 MMHG | HEIGHT: 66 IN | TEMPERATURE: 98.2 F | WEIGHT: 149 LBS | RESPIRATION RATE: 16 BRPM | SYSTOLIC BLOOD PRESSURE: 109 MMHG | HEART RATE: 98 BPM

## 2019-08-07 DIAGNOSIS — R49.8 VOICE STRAIN: ICD-10-CM

## 2019-08-07 DIAGNOSIS — R49.0 DYSPHONIA: Primary | ICD-10-CM

## 2019-08-07 PROCEDURE — 99213 OFFICE O/P EST LOW 20 MIN: CPT | Performed by: OTOLARYNGOLOGY

## 2019-08-07 PROCEDURE — 31505 DIAGNOSTIC LARYNGOSCOPY: CPT | Performed by: OTOLARYNGOLOGY

## 2019-08-07 NOTE — PROGRESS NOTES
Awilda Sullivan RN   Patient Intake Note    Review of Systems  Review of Systems   Constitutional: Negative.    HENT: Positive for congestion, postnasal drip, rhinorrhea, sore throat and voice change.    Eyes: Negative.    Respiratory: Positive for cough.    Cardiovascular: Negative.    Gastrointestinal: Negative.    Endocrine: Negative.    Genitourinary: Negative.    Musculoskeletal: Negative.    Skin: Positive for rash.   Allergic/Immunologic: Positive for environmental allergies.   Neurological: Positive for dizziness and headaches.   Hematological: Negative.    Psychiatric/Behavioral: Negative.        QUALITY MEASURES    Tobacco Use: Screening and Cessation Intervention  Social History    Tobacco Use      Smoking status: Never Smoker      Smokeless tobacco: Never Used        Awilda Sullivan RN  8/7/2019  4:11 PM

## 2019-08-07 NOTE — PROGRESS NOTES
Kike Olivarez Jr, MD     FOLLOW UP NOTE     Chief Complaint   Patient presents with   • Hoarse        HISTORY OF PRESENT ILLNESS:   Accompanied by:    Mariann Portillo is a  48 y.o. female who is here for follow up. She has had SLP evaluation.  Voice strain was dx'ed. She is referred for neck muscle PHYSICAL THERAPY.    Review of Systems  Reviewed per patient intake note and confirmed by me    Past History:  Past medical and surgical history, family history and social history reviewed and updated when appropriate.  Current medications and allergies reviewed and updated when appropriate.  Allergies:  Decongestant [pseudoephedrine]; Codimal-a [brompheniramine]; Levaquin [levofloxacin]; and Percocet [oxycodone-acetaminophen]        Vital Signs:   Temp:  [98.2 °F (36.8 °C)] 98.2 °F (36.8 °C)  Heart Rate:  [98] 98  Resp:  [16] 16  BP: (109)/(68) 109/68    EXAMINATION:   CONSTITUTIONAL: well nourished, well-developed, alert, oriented, in no acute distress   normal weight for height, thin build  COMMUNICATION AND VOICE: able to communicate normally, hoarse vocal quality  Normocephalic, without obvious abnormality  atraumatic  structure normal, no tenderness on palpation, no lesions, no evidence of trauma  SALIVARY GLANDS: parotid glands with no tenderness, no swelling, no masses, submandibular glands with normal size, nontender  BILATERAL: intact  EYES: ocular motility normal, eyelids normal, orbits normal, no proptosis, conjunctiva normal, sclera non-icteric, pupils equal, round   brown   HEARING: Response to conversational voice normal bilaterally  external ears normal to inspection and palpation, no tenderness or erythema  canals clear, normal cerumen and skin, without drainage, skin intact and healthy  tympanic membranes clear, normal landmarks and light reflex  normal light reflex and landmarks  middle ear without fluid, ossicular chain intact  no mastoid process tenderness      APPEARANCE: normal, straight,  with good projection, no tenderness, no lesions, no tenderness  ABNORMAL: pale, mod edema of mucosa, turbinates pael and mildly enlarged, Septum wide with DNS L to R low mild  LIPS: structure normal, no tenderness on palpation, no lesions, no evidence of trauma, normal mobility and oral competence  TEETH: dentition within normal limits for age  GUMS: gingivae healthy, no lesions  ORAL MUCOSA: oral mucosa normal, no mucosal lesions  FLOOR OF MOUTH: Warthin's duct patent, mucosa normal  TONGUE: lingual mucosa normal without lesions, normal tongue mobility  OROPHARYNX: oropharyngeal mucosa normal, tonsil fossa with normal appearance  bilateral  NECK: normal appearance, no masses, no lesions, larynx normal mobility, trachea midline  LYMPH NODES:  no adenopathy  THYROID: no overt thyromegaly, no tenderness, nodules or mass present on palpation, position midline   CHEST/RESPIRATORY: respiratory effort normal, no rales, rubs or wheezing  CARDIOVASCULAR: regular rate and rhythm, no murmurs, gallups, no peripheral edema  NEUROLOGIC/PSYCHIATRIC: oriented appropriately for age, mood normal, affect appropriate, cranial nerves intact grossly unless specifically mentioned   VOICE:   Min raspy, not breathy, low intensity, normal sentence formation    RESULTS REVIEW:    reviuiewd speech notes     Assessment    Diagnosis Plan   1. Dysphonia      intermittent improvement   2. Voice strain      with neck strain       Plan    Continue current management plan.   She will continue Vocal exercise for now. She will start Neck therapy.  I feel muscular strain, low grade is causing isues. I have communicated with SLP and agree with neck therapy to prevent bleed over from neck strain/tension  Patient, Spouse understands and agrees with the treatment plan as described.          Return in about 6 weeks (around 9/18/2019) for Recheck voice and neck strain.    Kike Olivarez Jr, MD  08/07/19  4:50 PM

## 2019-08-07 NOTE — PATIENT INSTRUCTIONS
Continue Voice exercises  Avoid voice strain    Apply Benadryl cream to itchy areas  Apply Hydrocortisone cream to itchy areas  Get from [employer]

## 2019-08-16 ENCOUNTER — HOSPITAL ENCOUNTER (OUTPATIENT)
Dept: PHYSICAL THERAPY | Facility: HOSPITAL | Age: 49
Setting detail: THERAPIES SERIES
Discharge: HOME | End: 2019-08-16

## 2019-08-16 DIAGNOSIS — R49.0 MUSCLE TENSION DYSPHONIA: Primary | ICD-10-CM

## 2019-08-16 PROCEDURE — 97161 PT EVAL LOW COMPLEX 20 MIN: CPT | Performed by: PHYSICAL THERAPIST

## 2019-08-16 NOTE — THERAPY EVALUATION
Outpatient Physical Therapy Ortho Initial Evaluation  [facility]     Patient Name: Mariann Portillo  : 1970  MRN: [id number]  Today's Date: 2019      Visit Date: 2019    There is no problem list on file for this patient.       Past Medical History:   Diagnosis Date   • Allergic rhinitis    • Asthma    • Chronic UTI    • Endometriosis    • Fibromyalgia    • High cholesterol    • IBS (irritable bowel syndrome)    • Meniere disease    • Migraines         Past Surgical History:   Procedure Laterality Date   • CHOLECYSTECTOMY     • CYST REMOVAL     • ENDOMETRIAL BIOPSY         Visit Dx:     ICD-10-CM ICD-9-CM   1. Muscle tension dysphonia R49.0 784.42         Patient History     Row Name 19 1600             History    Date Current Problem(s) Began  19  -TB      Brief Description of Current Complaint  In January, she was sick. She struggled speaking through that and after she got better, she struggled still with speaking. She started SLP last month but is still having problems. SLP recommended she start PT as well to help wiht the tension. She feels like she has to push to get her voice to work. Her voice will fatigue the more she talks. She denies any problems with reflux.   -TB         Pain     Pain Location  Head  -TB      Pain at Worst  10  -TB      Pain Frequency  Once a week  -TB        User Key  (r) = Recorded By, (t) = Taken By, (c) = Cosigned By    Initials Name Provider Type    TB Wily Hernandez, PT Physical Therapist          PT Ortho     Row Name 19 1800       Posture/Observations    Posture/Observations Comments  tends to slouch in sitting; on deep breathing, tends to overuse upper respiratory mms; coughed several times  -TB       Cervical Accessory Motions    Cervical Accessory Motions Tested?  Yes  -TB    Sideglide- C2  Hypomobile  -TB    Sideglide- C3  Hypomobile  -TB    Sideglide- C4  Hypomobile  -TB       Thoracic Accessory Motions    Thoracic Accessory Motions  Tested?  Yes  -TB    Pa glide- Upper thoracic  Hypomobile  -TB    Pa glide- Middle thoracic  Hypomobile  -TB      User Key  (r) = Recorded By, (t) = Taken By, (c) = Cosigned By    Initials Name Provider Type    TB Wily Hernandez, PT Physical Therapist                      Therapy Education  Education Details: breathing, posture, gentle vocalizations focusing on sinus resonators; massage to jaw mms and relax tongue  Given: HEP  Program: New  How Provided: Verbal, Demonstration(she videoed me demonstrating)  Provided to: Patient  Level of Understanding: Teach back education performed, Verbalized, Demonstrated     PT OP Goals     Row Name 08/16/19 1800          Long Term Goals    LTG Date to Achieve  09/13/19  -TB     LTG 1  Able to coordinate abdominal support without adding neck tension  -TB     LTG 1 Progress  New  -TB     LTG 2  Demonstrate improved posture with better cervical alignment  -TB     LTG 2 Progress  New  -TB     LTG 3  Demonstrate improve coordination of diaphragmatic breathing and rib excursion  -TB     LTG 3 Progress  New  -TB     LTG 4  Able to coordinate breathing and support with improved placement of sound in resonators without tensing neck  -TB     LTG 4 Progress  New  -TB     LTG 5  Demonstrate relaxation techniques for jaw mms and tongue  -TB     LTG 5 Progress  New  -TB     LTG 5 Progress Comments  w  -TB        Time Calculation    PT Goal Re-Cert Due Date  09/15/19  -TB       User Key  (r) = Recorded By, (t) = Taken By, (c) = Cosigned By    Initials Name Provider Type    TB Wily Hernandez, PT Physical Therapist          PT Assessment/Plan     Row Name 08/16/19 1800          PT Assessment    Functional Limitations  Limitation in home management;Performance in leisure activities  -TB     Impairments  Impaired postural alignment  -TB     Assessment Comments  Her vocal struggles appear to have several factors. She struggles with allergies and asthma and posture. When she was sick, she tended  to push her cords to gain volume stressing them more. She struggled with coordinating diaphragmatic breathing and rib excursion without overusing upper respiratory accessory mms. While starting abdominal support, she struggled isolating this and not tightening her neck. She tends to keep her jaw clinched which will also add tension to her neck. Today we talked about the importance of gaining a better foundation for her voice with better posture, breathing and support and learning how to relax. We also started to touch on using her voice resonators better for volume and learning to keep her neck relaxed. This will take some time for her to build a better foundation for adding complexity to the vocal sounds later.   -TB     Please refer to paper survey for additional self-reported information  Yes  -TB     Rehab Potential  Good  -TB     Patient/caregiver participated in establishment of treatment plan and goals  Yes  -TB     Patient would benefit from skilled therapy intervention  Yes  -TB        PT Plan    PT Frequency  1x/week  -TB     Predicted Duration of Therapy Intervention (Therapy Eval)  4-6 weeks  -TB     Planned CPT's?  PT EVAL LOW COMPLEXITY: 65107;PT THER PROC EA 15 MIN: 46630;PT THER ACT EA 15 MIN: 82971;PT MANUAL THERAPY EA 15 MIN: 96713  -TB     PT Plan Comments  Assess her compliance and practice with her HEP. Build on adding more vocalizations as she improves on her support and keeping her neck relaxed. May offer soft tissue work to help with muscle relaxation and to improve posture.   -TB       User Key  (r) = Recorded By, (t) = Taken By, (c) = Cosigned By    Initials Name Provider Type    TB Wily Hernandez, PT Physical Therapist                                        Time Calculation:     Start Time: 1555  Stop Time: 1655  Time Calculation (min): 60 min     Therapy Charges for Today     Code Description Service Date Service Provider Modifiers Qty    [id number]  PT EVAL LOW COMPLEXITY 4  8/16/2019 Wily Hernandez, PT GP 1                    Wily Hernandez, PT  8/16/2019

## 2019-08-23 NOTE — PROGRESS NOTES
ENT Procedure Note     Anesthesia: topical 4% tetracaine and oxymetazoline mix     Endoscopy Type: Flexible Laryngoscopy     Indications for Procedure: Voice disorder     Procedure Details:    The patient was placed in the sitting position.  The 4 mm laryngoscope was passed.  The nasal cavities, nasopharynx, oropharynx, hypopharynx, and larynx were all examined.  Vocal cords were examined during respiration and phonation.  The following findings were noted:     Findings: normal appearance of the epiglottis, arytenoids, and true vocal cords, normal vocal cord mobility, no lesions present, no obstruction present, but there appears to be a vocal strain with full vocalization still present     Remaining exam normal     Condition:  Stable.  Patient tolerated procedure well.     Complications:  None

## 2019-08-27 ENCOUNTER — APPOINTMENT (OUTPATIENT)
Dept: PHYSICAL THERAPY | Facility: HOSPITAL | Age: 49
End: 2019-08-27

## 2019-09-05 ENCOUNTER — TREATMENT (OUTPATIENT)
Dept: PHYSICAL THERAPY | Facility: CLINIC | Age: 49
End: 2019-09-05

## 2019-09-05 DIAGNOSIS — R49.0 MUSCLE TENSION DYSPHONIA: Primary | ICD-10-CM

## 2019-09-05 PROCEDURE — 97140 MANUAL THERAPY 1/> REGIONS: CPT | Performed by: PHYSICAL THERAPIST

## 2019-09-05 PROCEDURE — 97530 THERAPEUTIC ACTIVITIES: CPT | Performed by: PHYSICAL THERAPIST

## 2019-09-05 NOTE — PROGRESS NOTES
Outpatient Physical Therapy Ortho Treatment Note       Patient Name: Mariann Portillo  : 1970  MRN: [id number]  Today's Date: 2019      Visit Date: 2019    Visit Dx:    ICD-10-CM ICD-9-CM   1. 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       Past Medical History:   Diagnosis Date   • Allergic rhinitis    • Asthma    • Chronic UTI    • Endometriosis    • Fibromyalgia    • High cholesterol    • IBS (irritable bowel syndrome)    • Meniere disease    • Migraines         Past Surgical History:   Procedure Laterality Date   • CHOLECYSTECTOMY     • CYST REMOVAL     • ENDOMETRIAL BIOPSY                         PT Assessment/Plan     Row Name 19 1800          PT Assessment    Assessment Comments  Today was her first visit after her eval. SHe did better at her breathing and resonance. She still tends to carry alot of stress in her jaw and tongue but she feels like she can speak with less breaknig.  -TB        PT Plan    PT Plan Comments  Work on slowly adding support and volume.   -TB       User Key  (r) = Recorded By, (t) = Taken By, (c) = Cosigned By    Initials Name Provider Type    TB Wily Hernandez, PT Physical Therapist            OP Exercises     Row Name 19 1800 19 1600          Subjective Comments    Subjective Comments  --  She's been trying to working placing her sound more in her sinuses as she speaks.   -TB        Subjective Pain    Pre-Treatment Pain Level  --  0  -TB        Total Minutes    61851 - PT Therapeutic Exercise Minutes  20  -TB  --     57314 - PT Manual Therapy Minutes  30  -TB  --        Exercise 1    Exercise Name 1  --  supine diaphragmatic breath with rib excursion  -TB        Exercise 2    Exercise Name 2  --  worked on light vocalization with resonance; cued to separate jaw, keep tongue low and keep larynx low; added vocal padgett to keep throat relaxed  -TB     Additional Comments  --  progressed from supine to  sitting  -TB       User Key  (r) = Recorded By, (t) = Taken By, (c) = Cosigned By    Initials Name Provider Type    TB Wily Hernandez, PT Physical Therapist                      Manual Rx (last 36 hours)      Manual Treatments     Row Name 09/05/19 1800 09/05/19 1700          Total Minutes    26547 - PT Manual Therapy Minutes  30  -TB  --        Manual Rx 1    Manual Rx 1 Location  --  prone gunjan UT/LS  -TB     Manual Rx 1 Type  --  STM  -TB     Manual Rx 1 Duration  --  10  -TB        Manual Rx 2    Manual Rx 2 Location  --  supine SCM, Scalene, dygastric, masseter  -TB     Manual Rx 2 Type  --  STM  -TB     Manual Rx 2 Grade  --  subocc  -TB     Manual Rx 2 Duration  --  20  -TB       User Key  (r) = Recorded By, (t) = Taken By, (c) = Cosigned By    Initials Name Provider Type    TB Wily Hernandez, PT Physical Therapist          PT OP Goals     Row Name 09/05/19 1700          Long Term Goals    LTG Date to Achieve  09/13/19  -TB     LTG 1  Able to coordinate abdominal support without adding neck tension  -TB     LTG 1 Progress  Met  -TB     LTG 1 Progress Comments  did much better with this today  -TB     LTG 2  Demonstrate improved posture with better cervical alignment  -TB     LTG 2 Progress  Ongoing  -TB     LTG 3  Demonstrate improve coordination of diaphragmatic breathing and rib excursion  -TB     LTG 3 Progress  Ongoing  -TB     LTG 4  Able to coordinate breathing and support with improved placement of sound in resonators without tensing neck  -TB     LTG 4 Progress  Ongoing  -TB     LTG 5  Demonstrate relaxation techniques for jaw mms and tongue  -TB     LTG 5 Progress  Ongoing  -TB     LTG 5 Progress Comments  still tends to tighten jaw  -TB       User Key  (r) = Recorded By, (t) = Taken By, (c) = Cosigned By    Initials Name Provider Type    Wily Roberts, PT Physical Therapist          Therapy Education  Education Details: seated vocal padgett adn sirens keeping larynx low and jaws relaxed               Time Calculation:                    Wily Hernandez, PT  9/5/2019

## 2019-09-12 ENCOUNTER — OFFICE VISIT (OUTPATIENT)
Dept: RETAIL CLINIC | Facility: CLINIC | Age: 49
End: 2019-09-12

## 2019-09-12 VITALS
RESPIRATION RATE: 20 BRPM | OXYGEN SATURATION: 98 % | TEMPERATURE: 97.7 F | SYSTOLIC BLOOD PRESSURE: 116 MMHG | HEART RATE: 96 BPM | DIASTOLIC BLOOD PRESSURE: 79 MMHG

## 2019-09-12 DIAGNOSIS — J01.00 ACUTE MAXILLARY SINUSITIS, RECURRENCE NOT SPECIFIED: ICD-10-CM

## 2019-09-12 DIAGNOSIS — J06.9 ACUTE URI: Primary | ICD-10-CM

## 2019-09-12 PROCEDURE — 99213 OFFICE O/P EST LOW 20 MIN: CPT | Performed by: NURSE PRACTITIONER

## 2019-09-12 RX ORDER — MOMETASONE FUROATE 1 MG/G
1 CREAM TOPICAL DAILY
COMMUNITY
Start: 2019-08-08

## 2019-09-12 RX ORDER — AMOXICILLIN 875 MG/1
875 TABLET, COATED ORAL 2 TIMES DAILY
Qty: 20 TABLET | Refills: 0 | Status: SHIPPED | OUTPATIENT
Start: 2019-09-12 | End: 2020-02-17 | Stop reason: ALTCHOICE

## 2019-09-12 RX ORDER — PIMECROLIMUS 10 MG/G
CREAM TOPICAL
Refills: 1 | COMMUNITY
Start: 2019-07-13

## 2019-09-12 RX ORDER — GUAIFENESIN 600 MG/1
1200 TABLET, EXTENDED RELEASE ORAL 2 TIMES DAILY
Qty: 40 TABLET | Refills: 0 | Status: SHIPPED | OUTPATIENT
Start: 2019-09-12 | End: 2020-02-17 | Stop reason: ALTCHOICE

## 2019-09-12 NOTE — PATIENT INSTRUCTIONS

## 2019-09-16 ENCOUNTER — OFFICE VISIT (OUTPATIENT)
Dept: OTOLARYNGOLOGY | Facility: CLINIC | Age: 49
End: 2019-09-16

## 2019-09-16 VITALS
DIASTOLIC BLOOD PRESSURE: 77 MMHG | TEMPERATURE: 97.7 F | WEIGHT: 147 LBS | OXYGEN SATURATION: 99 % | BODY MASS INDEX: 23.63 KG/M2 | HEIGHT: 66 IN | HEART RATE: 76 BPM | SYSTOLIC BLOOD PRESSURE: 122 MMHG

## 2019-09-16 DIAGNOSIS — R49.0 DYSPHONIA: Primary | ICD-10-CM

## 2019-09-16 DIAGNOSIS — J06.9 VIRAL UPPER RESPIRATORY TRACT INFECTION: ICD-10-CM

## 2019-09-16 DIAGNOSIS — R49.8 VOICE STRAIN: ICD-10-CM

## 2019-09-16 PROCEDURE — 99213 OFFICE O/P EST LOW 20 MIN: CPT | Performed by: OTOLARYNGOLOGY

## 2019-09-16 RX ORDER — PREDNISONE 10 MG/1
10 TABLET ORAL 3 TIMES DAILY
Qty: 10 TABLET | Refills: 1 | Status: SHIPPED | OUTPATIENT
Start: 2019-09-16 | End: 2019-09-19

## 2019-09-16 RX ORDER — FLUTICASONE PROPIONATE 50 MCG
2 SPRAY, SUSPENSION (ML) NASAL 2 TIMES DAILY
Qty: 48 G | Refills: 3 | Status: SHIPPED | OUTPATIENT
Start: 2019-09-16 | End: 2020-02-17 | Stop reason: ALTCHOICE

## 2019-09-16 RX ORDER — OXYMETAZOLINE HYDROCHLORIDE 0.05 G/100ML
2 SPRAY NASAL 3 TIMES DAILY
Qty: 2 ML | Refills: 0 | Status: SHIPPED | OUTPATIENT
Start: 2019-09-16 | End: 2019-09-19

## 2019-09-16 NOTE — PROGRESS NOTES
Nisreen Bernabe LPN   Patient Intake Note    Review of Systems  Review of Systems   Constitutional: Positive for fatigue. Negative for chills and fever.   HENT:        See HPI   Respiratory: Negative for cough, choking and shortness of breath.    Gastrointestinal: Negative for diarrhea, nausea and vomiting.   Neurological: Positive for light-headedness and headaches. Negative for dizziness.   Psychiatric/Behavioral: Positive for sleep disturbance.       QUALITY MEASURES    Tobacco Use: Screening and Cessation Intervention  Social History    Tobacco Use      Smoking status: Never Smoker      Smokeless tobacco: Never Used  }    Nisreen Bernabe LPN  9/16/2019  4:01 PM

## 2019-09-16 NOTE — PATIENT INSTRUCTIONS
RECOMMENDATIONS FOR COLDS, FLU, CONGESTION    Symptoms of Flu and colds are very common and frequently are treated with good old-fashioned remedies. We strongly support the use of these safe and effective remedies for the treatment of many ailments, either alone or in conjunction with prescribed medications. Please feel free to use the following recommendations (and your common sense) to treat certain problems at home.    -Acetominphen(Tylenol)- reduces fever and reduces pain. Please be careful of many over the counter products, which contain this, and follow dosage recommendations.    -Ibuprofen (Advil)- reduces pain, fever and inflammation. May cause stomach upset. This is also in many over the counter products, so follow dosing recommendations.    -Aspirin- While Aspirin has been used for ages, most health care experts feels there is an increased risk of Reye’s Syndrome when used for colds, especially in children. There are other associated problems with Aspirin use as well.     -Robitussin- wonderful mucus thinner. Can cause mild nausea, and must be taken in rather large doses (adults 3 tablespoons, 4 times daily, for children ask).    -Water- Great for reducing fever and maintaining hydration, especially when ill. Water is great for thinning secretions (oral, sinus, etc.) and should be part of any treatment or diet. You can almost never drink too much.    -Benadryl- A wonderful antihistamine for drying up the nose. This can be used safely with a number of other medications. The only drawback is that it can cause some sleepiness, but this can be used to one’s advantage at bedtime or for rest at any time.    -Saline rinses- Saline spray can be used quite frequently in the nose. This loosens crust and thins secretions, as well as washing out the nose. For dry, crusty noses, copious secretions or allergic noses, this treatment is indispensable in allergy and colds.    -Warm salt-water gargles- great for relieving  sore throats and healing surgery of the throats. We have a recipe that is inexpensive and easy to use. Please ask about this.    -Humidifier or vaporizer- a great symptom reliever, especially when dryness is a problem. Use either warm or cold, whichever is more comfortable.    -Vicks inhaler- safe and easy decongestant.     -Vicks vapor rub- great decongestant.    -Warm steamy showers- great for opening heads and loosening congestion.    -Chloraseptic- topical painkiller for the throat that is fairly safe to use.    There are many home remedies for treating many conditions. Please share yours with us to help other patients.    ANTACID USE:  Use antacids 30 mins after every meal, 2 hours after every meal and at Bedtime  Use Gaviscon Extra (best), Tums, Rolaids, etc  Over the counter  Use 2 tsp or 2 tabs as the dose  Remember that some of these products contain Calcium or Aluminum. If you have dietary or medical issues, please inform physician    NASAL SALINE:  Use 2 puffs each nostril 4-6 times daily and more frequently if possible.  You can buy saline spray or you can make your own and use an old spray bottle to administer  Use a humidifier at bedside  Recipe for saline:d  Water                                 1 quart  Salt (table)                        1 tablespoon  Gylcerin (or Jess Syrup)    1 teaspoon  Sodium bicarbonate           1 teaspoon  Sprays or Plessis pots are recommended      Nasal steroid use:  Using nasal steroids:  You will be prescribed one of the following nasal steroids: Flonase, Nasacort, Nasonex, Rhinocort, Qnasl, Zetonna  2 puffs each nostril 2 times daily  Start as soon as possible  If you are using Afrin for 3 days with the nasal steroid,  Use Afrin first and wait 10 minutes to allow the nose to open. Then administer nasal steroids.    Afrin use:  Use 2 puffs each nostril 3 times daily for 3 days only!!  Stop using after 3 days unless instructed to use longer    Finish amoxicillin  Continue  Mucinex    Prednisone 10 mg 3 times daily for 3 days    Jose the office Thursday to report on condition

## 2019-09-16 NOTE — PROGRESS NOTES
Kike Olivarez Jr, MD     FOLLOW UP NOTE     Chief Complaint   Patient presents with   • Follow-up     developing a cough and no improvement        HISTORY OF PRESENT ILLNESS:   Accompanied by:    Mariann Portillo is a  48 y.o. female who is here for follow up. Began getting sick last Sat. Then Wed, sore throat. No fever. Voice went out Thurs.   was sick prior to this.  She denies N/V/D.  Cough is starting.  Amox and mucinex. She was told sinus infection. No imaging or blood tests.  Throat hurts really bad today.  Feels the same as the episode in the winter when voice got bad.  She now has decreased energy level.  Therapy- helped neck and voice    Review of Systems  Reviewed per patient intake note and confirmed by me    Past History:  Past medical and surgical history, family history and social history reviewed and updated when appropriate.  Current medications and allergies reviewed and updated when appropriate.  Allergies:  Decongestant [pseudoephedrine]; Codimal-a [brompheniramine]; Levaquin [levofloxacin]; and Percocet [oxycodone-acetaminophen]        Vital Signs:   Temp:  [97.7 °F (36.5 °C)] 97.7 °F (36.5 °C)  Heart Rate:  [76] 76  BP: (122)/(77) 122/77    EXAMINATION:   CONSTITUTIONAL:   well nourished, well-developed, alert, oriented, in no acute distress   normal weight for height  COMMUNICATION AND VOICE:   unable to vocalize  HEAD:   atraumatic  structure normal, no tenderness on palpation, no lesions, no evidence of trauma  SALIVARY GLANDS: parotid glands with no tenderness, no swelling, no masses, submandibular glands with normal size, nontender  BILATERAL: intact  EYES: ocular motility normal, eyelids normal, orbits normal, no proptosis, conjunctiva normal, sclera non-icteric, pupils equal, round   Color- brown   HEARING: Response to conversational voice normal bilaterally  EARS:  external ears normal to inspection and palpation, no tenderness or erythema  canals clear, normal cerumen and  skin, without drainage, skin intact and healthy  tympanic membranes clear, normal landmarks and light reflex  normal light reflex and landmarks  middle ear without fluid, ossicular chain intact  no mastoid process tenderness    NOSE:  APPEARANCE: normal, straight, with good projection, no tenderness, no lesions, no tenderness  NOSE INTERNAL:  ABNORMAL: pale, mod edema of mucosa, turbinates pael and mildly enlarged, Septum wide with DNS L to R low mild  ORAL CAVITY:  LIPS: structure normal, no tenderness on palpation, no lesions, no evidence of trauma, normal mobility and oral competence  TEETH: dentition within normal limits for age  GUMS: gingivae healthy, no lesions  ORAL MUCOSA: oral mucosa normal, no mucosal lesions  FLOOR OF MOUTH: Warthin's duct patent, mucosa normal  TONGUE: lingual mucosa normal without lesions, normal tongue mobility  OROPHARYNX: oropharyngeal mucosa normal, tonsil fossa with normal appearance  bilateral  NECK: normal appearance, no masses, no lesions, larynx normal mobility, trachea midline  LYMPH NODES:  no adenopathy  THYROID: no overt thyromegaly, no tenderness, nodules or mass present on palpation, position midline   CHEST/RESPIRATORY: respiratory effort normal, no rales, rubs or wheezing  CARDIOVASCULAR: regular rate and rhythm, no murmurs, gallups, no peripheral edema  NEUROLOGIC/PSYCHIATRIC: oriented appropriately for age, mood normal, affect appropriate, cranial nerves intact grossly unless specifically mentioned     RESULTS REVIEW:    SLP report reviewed     Assessment    Diagnosis Plan   1. Dysphonia      regressed with URI   2. Voice strain      Improved with neck therapy   3. Viral upper respiratory tract infection      Acute       Plan    Continue current management plan.  New Medications Ordered This Visit   Medications   • predniSONE (DELTASONE) 10 MG tablet     Sig: Take 1 tablet by mouth 3 (Three) Times a Day for 3 days.     Dispense:  10 tablet     Refill:  1   •  fluticasone (FLONASE) 50 MCG/ACT nasal spray     Si sprays into the nostril(s) as directed by provider 2 (Two) Times a Day.     Dispense:  48 g     Refill:  3   • oxymetazoline (AFRIN) 0.05 % nasal spray     Si sprays into the nostril(s) as directed by provider 3 (Three) Times a Day for 3 days. Use for 3 days then stop     Dispense:  2 mL     Refill:  0   I will allow patien to resolve the URI issues and then re-assess voice. She is in therapy for her neck tension causing voice tension. She was noting some improvement, until this occurrence. I am concerned about regression of voice.  I will re-assess after resolution with Flex scope.  Afrin for 3 days  Flonase  Antacids  Cold/flu care  Pred 10 mg tid x 3 days  Patient, Spouse understand(s) and agree(s) with the treatment plan as described.         Return Call office Thursday to report on condition, for Recheck throat.    Kike Olivarez Jr, MD  19  4:38 PM

## 2019-09-20 ENCOUNTER — TELEPHONE (OUTPATIENT)
Dept: OTOLARYNGOLOGY | Facility: CLINIC | Age: 49
End: 2019-09-20

## 2019-09-20 NOTE — TELEPHONE ENCOUNTER
----- Message from Kike Olivarez Jr., MD sent at 9/20/2019  1:18 PM CDT -----  3 tsp QID and QHS, after each meal    ----- Message -----  From: Nisreen Bernabe LPN  Sent: 9/20/2019  10:25 AM  To: Kike Olivarez Jr., MD    Hey Dr. Olivarez, pt  called stating when they saw you last week they could get liquid antiacid over the counter. They did buy the antiacids now they want to know how much to use.

## 2019-09-26 ENCOUNTER — OFFICE VISIT (OUTPATIENT)
Dept: OTOLARYNGOLOGY | Facility: CLINIC | Age: 49
End: 2019-09-26

## 2019-09-26 DIAGNOSIS — G90.9 AUTONOMIC DYSFUNCTION: ICD-10-CM

## 2019-09-26 DIAGNOSIS — R09.82 POST-NASAL DRIP: ICD-10-CM

## 2019-09-26 DIAGNOSIS — J32.9 CHRONIC SINUSITIS, UNSPECIFIED LOCATION: ICD-10-CM

## 2019-09-26 DIAGNOSIS — R49.0 DYSPHONIA: Primary | ICD-10-CM

## 2019-09-26 DIAGNOSIS — R49.8 VOICE STRAIN: ICD-10-CM

## 2019-09-26 PROCEDURE — 99213 OFFICE O/P EST LOW 20 MIN: CPT | Performed by: OTOLARYNGOLOGY

## 2019-09-26 PROCEDURE — 31575 DIAGNOSTIC LARYNGOSCOPY: CPT | Performed by: OTOLARYNGOLOGY

## 2019-09-26 NOTE — PROGRESS NOTES
Nisreen Bernabe LPN   Patient Intake Note    Review of Systems  Review of Systems   Constitutional: Negative for chills, fatigue and fever.   HENT:        See HPI   Respiratory: Positive for cough and choking. Negative for shortness of breath.    Gastrointestinal: Negative for diarrhea, nausea and vomiting.   Neurological: Negative for dizziness, light-headedness and headaches.   Psychiatric/Behavioral: Positive for sleep disturbance.       QUALITY MEASURES    Tobacco Use: Screening and Cessation Intervention  Social History    Tobacco Use      Smoking status: Never Smoker      Smokeless tobacco: Never Used        Nisreen Bernabe LPN  9/26/2019  3:48 PM

## 2019-09-26 NOTE — PROGRESS NOTES
Kike Olivarez Jr, MD     ENT Procedure Note    Anesthesia: topical 4% tetracaine and oxymetazoline mix    Endoscopy Type: Flexible Laryngoscopy    Indications for Procedure:     Procedure Details:    The patient was placed in an upright position.  The laryngoscope was passed right nasal passge.  The nasal cavities, nasopharynx, oropharynx, hypopharynx, and larynx were all examined.  Vocal cords were examined during respiration and phonation.  The following findings were noted:    Findings: normal appearance of the epiglottis, arytenoids, and true vocal cords, normal vocal cord mobility, no lesions present, no obstruction present.  but there appears to be a vocal strain with full vocalization with tight closure and inability to relax easily    Condition:  Stable.  Patient tolerated procedure well.    Complications:  None    Post-procedure instructions reviewed with Patient, Spouse  Instructions documented in AVS for patient to review

## 2019-09-26 NOTE — PROGRESS NOTES
Kike Olivarez Jr, MD     FOLLOW UP NOTE     Chief Complaint   Patient presents with   • Follow-up        HISTORY OF PRESENT ILLNESS:   Accompanied by:    Mariann Portillo is a  49 y.o. female who is here for follow up. She remains hoarse and tired.  Throat- pain generalized upper pain with scratchiness.  She has had a little return but not long.  She says throat itches and hurts.  She is tired.  She sleeps intermittently.  She wakes frequently. She has stuffy nose if she does not sleep on pillows.    Review of Systems  Reviewed per patient intake note and confirmed by me    Past History:  Past medical and surgical history, family history and social history reviewed and updated when appropriate.  Current medications and allergies reviewed and updated when appropriate.  Allergies:  Decongestant [pseudoephedrine]; Codimal-a [brompheniramine]; Levaquin [levofloxacin]; and Percocet [oxycodone-acetaminophen]        Vital Signs:        EXAMINATION:   CONSTITUTIONAL:   well nourished, well-developed, alert, oriented, in no acute distress   normal weight for height  COMMUNICATION AND VOICE:   unable to vocalize  HEAD:   atraumatic  structure normal, no tenderness on palpation, no lesions, no evidence of trauma  SALIVARY GLANDS: parotid glands with no tenderness, no swelling, no masses, submandibular glands with normal size, nontender  BILATERAL: intact  EYES: ocular motility normal, eyelids normal, orbits normal, no proptosis, conjunctiva normal, sclera non-icteric, pupils equal, round   Color- brown   HEARING: Response to conversational voice normal bilaterally  EARS:  external ears normal to inspection and palpation, no tenderness or erythema  canals clear, normal cerumen and skin, without drainage, skin intact and healthy  tympanic membranes clear, normal landmarks and light reflex  normal light reflex and landmarks  middle ear without fluid, ossicular chain intact  no mastoid process  tenderness    NOSE:  APPEARANCE: normal, straight, with good projection, no tenderness, no lesions, no tenderness  NOSE INTERNAL:  ABNORMAL: pale, mod edema of mucosa, turbinates peal and mildly enlarged, Septum wide with DNS CL to R low mild  ORAL CAVITY:  LIPS: structure normal, no tenderness on palpation, no lesions, no evidence of trauma, normal mobility and oral competence  TEETH: dentition within normal limits for age  GUMS: gingivae healthy, no lesions  ORAL MUCOSA: oral mucosa normal, no mucosal lesions  FLOOR OF MOUTH: Warthin's duct patent, mucosa normal  TONGUE: lingual mucosa normal without lesions, normal tongue mobility  OROPHARYNX: oropharyngeal mucosa normal, tonsil fossa with normal appearance  bilateral  NECK: normal appearance, no masses, no lesions, larynx normal mobility, trachea midline  LYMPH NODES:  no adenopathy  THYROID: no overt thyromegaly, no tenderness, nodules or mass present on palpation, position midline   CHEST/RESPIRATORY: respiratory effort normal, no rales, rubs or wheezing  CARDIOVASCULAR: regular rate and rhythm, no murmurs, gallups, no peripheral edema  NEUROLOGIC/PSYCHIATRIC: oriented appropriately for age, mood normal, affect appropriate, cranial nerves intact grossly unless specifically mentioned     VOICE: VOICE QUALITY:     hoarse-  severe    raspy-  mild    breathy-  mild    weak-  severe    sentence length: normal    intensity: very low    Whisper    RESULTS REVIEW:    No reports available for review   Reviewed old records     Assessment    Diagnosis Plan   1. Dysphonia      Worsening   2. Voice strain      severe   3. Chronic sinusitis, unspecified location     4. Autonomic dysfunction     5. Post-nasal drip         Plan    Medical and surgical options were discussed including observation, continued medical management and university referral. Risks, benefits and alternatives were discussed and questions were answered. After considering the options, the patient decided  to proceed with CT scanning and university referral.   Patient has severe voice strain not relaxing at all.  I see no pathology on TVCs except strain.  I will get CT to evaluate PND and sinusitis by history.  I have reviewed chart and am concerned about Fibromyalgia diagnosis. Jonn collado have Autonomic dysautonomia, given her diverse symptoms and PMH.  I will avoid medications right now.  Refer to [facility]  No medications  CT sinus ordered- will call results       Patient, Spouse understand(s) and agree(s) with the treatment plan as described.    Return After voice consultation, for Recheck voice.    Kike Olivarez Jr, MD  09/26/19  4:26 PM

## 2019-09-30 LAB
ALBUMIN SERPL-MCNC: 4.3 G/DL (ref 3.5–5.2)
ALP BLD-CCNC: 105 U/L (ref 35–104)
ALT SERPL-CCNC: 13 U/L (ref 5–33)
ANION GAP SERPL CALCULATED.3IONS-SCNC: 11 MMOL/L (ref 7–19)
AST SERPL-CCNC: 18 U/L (ref 5–32)
BASOPHILS ABSOLUTE: 0.1 K/UL (ref 0–0.2)
BASOPHILS RELATIVE PERCENT: 1.1 % (ref 0–1)
BILIRUB SERPL-MCNC: 0.3 MG/DL (ref 0.2–1.2)
BUN BLDV-MCNC: 11 MG/DL (ref 6–20)
CALCIUM SERPL-MCNC: 9.7 MG/DL (ref 8.6–10)
CHLORIDE BLD-SCNC: 99 MMOL/L (ref 98–111)
CHOLESTEROL, TOTAL: 264 MG/DL (ref 160–199)
CO2: 27 MMOL/L (ref 22–29)
CREAT SERPL-MCNC: 0.6 MG/DL (ref 0.5–0.9)
EOSINOPHILS ABSOLUTE: 0.3 K/UL (ref 0–0.6)
EOSINOPHILS RELATIVE PERCENT: 2.1 % (ref 0–5)
GFR NON-AFRICAN AMERICAN: >60
GLUCOSE BLD-MCNC: 90 MG/DL (ref 74–109)
HCT VFR BLD CALC: 41.2 % (ref 37–47)
HDLC SERPL-MCNC: 70 MG/DL (ref 65–121)
HEMOGLOBIN: 13.6 G/DL (ref 12–16)
IMMATURE GRANULOCYTES #: 0.1 K/UL
LDL CHOLESTEROL CALCULATED: 161 MG/DL
LYMPHOCYTES ABSOLUTE: 3.8 K/UL (ref 1.1–4.5)
LYMPHOCYTES RELATIVE PERCENT: 29.1 % (ref 20–40)
MCH RBC QN AUTO: 31.9 PG (ref 27–31)
MCHC RBC AUTO-ENTMCNC: 33 G/DL (ref 33–37)
MCV RBC AUTO: 96.7 FL (ref 81–99)
MONOCYTES ABSOLUTE: 0.7 K/UL (ref 0–0.9)
MONOCYTES RELATIVE PERCENT: 5.1 % (ref 0–10)
NEUTROPHILS ABSOLUTE: 8 K/UL (ref 1.5–7.5)
NEUTROPHILS RELATIVE PERCENT: 61.5 % (ref 50–65)
PDW BLD-RTO: 11.9 % (ref 11.5–14.5)
PLATELET # BLD: 404 K/UL (ref 130–400)
PMV BLD AUTO: 9.3 FL (ref 9.4–12.3)
POTASSIUM SERPL-SCNC: 4.7 MMOL/L (ref 3.5–5)
RBC # BLD: 4.26 M/UL (ref 4.2–5.4)
SODIUM BLD-SCNC: 137 MMOL/L (ref 136–145)
TOTAL PROTEIN: 7.5 G/DL (ref 6.6–8.7)
TRIGL SERPL-MCNC: 167 MG/DL (ref 0–149)
TSH SERPL DL<=0.05 MIU/L-ACNC: 1.36 UIU/ML (ref 0.27–4.2)
WBC # BLD: 13 K/UL (ref 4.8–10.8)

## 2019-10-03 ENCOUNTER — OFFICE VISIT (OUTPATIENT)
Dept: URGENT CARE | Age: 49
End: 2019-10-03
Payer: COMMERCIAL

## 2019-10-03 VITALS
OXYGEN SATURATION: 98 % | SYSTOLIC BLOOD PRESSURE: 106 MMHG | DIASTOLIC BLOOD PRESSURE: 71 MMHG | TEMPERATURE: 99.1 F | RESPIRATION RATE: 16 BRPM | HEART RATE: 103 BPM | WEIGHT: 154 LBS | BODY MASS INDEX: 24.75 KG/M2 | HEIGHT: 66 IN

## 2019-10-03 DIAGNOSIS — J02.9 SORE THROAT: Primary | ICD-10-CM

## 2019-10-03 DIAGNOSIS — R53.83 FATIGUE, UNSPECIFIED TYPE: ICD-10-CM

## 2019-10-03 LAB
HETEROPHILE ANTIBODIES: NEGATIVE
S PYO AG THROAT QL: NORMAL

## 2019-10-03 PROCEDURE — 86308 HETEROPHILE ANTIBODY SCREEN: CPT | Performed by: NURSE PRACTITIONER

## 2019-10-03 PROCEDURE — 99213 OFFICE O/P EST LOW 20 MIN: CPT | Performed by: NURSE PRACTITIONER

## 2019-10-03 PROCEDURE — 87880 STREP A ASSAY W/OPTIC: CPT | Performed by: NURSE PRACTITIONER

## 2019-10-03 RX ORDER — ONDANSETRON 4 MG/1
4 TABLET, ORALLY DISINTEGRATING ORAL 3 TIMES DAILY PRN
Qty: 15 TABLET | Refills: 0 | Status: SHIPPED | OUTPATIENT
Start: 2019-10-03 | End: 2019-10-08

## 2019-10-03 ASSESSMENT — ENCOUNTER SYMPTOMS
SINUS PAIN: 0
COUGH: 0
SINUS PRESSURE: 0
VOICE CHANGE: 1
SORE THROAT: 1
ABDOMINAL PAIN: 1
SHORTNESS OF BREATH: 0
ALLERGIC/IMMUNOLOGIC NEGATIVE: 1
VOMITING: 0
EYES NEGATIVE: 1
SWOLLEN GLANDS: 0
NAUSEA: 1
DIARRHEA: 0

## 2019-10-04 ENCOUNTER — TELEPHONE (OUTPATIENT)
Dept: URGENT CARE | Age: 49
End: 2019-10-04

## 2019-10-07 ENCOUNTER — HOSPITAL ENCOUNTER (OUTPATIENT)
Dept: CT IMAGING | Facility: HOSPITAL | Age: 49
Discharge: HOME | End: 2019-10-07
Admitting: OTOLARYNGOLOGY

## 2019-10-07 DIAGNOSIS — J32.9 CHRONIC SINUSITIS, UNSPECIFIED LOCATION: ICD-10-CM

## 2019-10-07 DIAGNOSIS — R09.82 POST-NASAL DRIP: ICD-10-CM

## 2019-10-07 PROCEDURE — 70486 CT MAXILLOFACIAL W/O DYE: CPT

## 2019-10-08 ENCOUNTER — HOSPITAL ENCOUNTER (OUTPATIENT)
Dept: WOMENS IMAGING | Age: 49
Discharge: HOME | End: 2019-10-08
Payer: COMMERCIAL

## 2019-10-08 ENCOUNTER — APPOINTMENT (OUTPATIENT)
Dept: CT IMAGING | Facility: HOSPITAL | Age: 49
End: 2019-10-08

## 2019-10-08 ENCOUNTER — OFFICE VISIT (OUTPATIENT)
Dept: OTOLARYNGOLOGY | Facility: CLINIC | Age: 49
End: 2019-10-08

## 2019-10-08 VITALS
BODY MASS INDEX: 25.34 KG/M2 | SYSTOLIC BLOOD PRESSURE: 112 MMHG | OXYGEN SATURATION: 98 % | TEMPERATURE: 97.6 F | DIASTOLIC BLOOD PRESSURE: 88 MMHG | HEART RATE: 80 BPM | WEIGHT: 157 LBS

## 2019-10-08 DIAGNOSIS — Z12.39 BREAST CANCER SCREENING: ICD-10-CM

## 2019-10-08 DIAGNOSIS — R49.8 VOICE STRAIN: ICD-10-CM

## 2019-10-08 DIAGNOSIS — R09.82 POST-NASAL DRIP: ICD-10-CM

## 2019-10-08 DIAGNOSIS — J32.9 CHRONIC SINUSITIS, UNSPECIFIED LOCATION: ICD-10-CM

## 2019-10-08 DIAGNOSIS — J06.9 VIRAL UPPER RESPIRATORY TRACT INFECTION: ICD-10-CM

## 2019-10-08 DIAGNOSIS — R49.0 DYSPHONIA: Primary | ICD-10-CM

## 2019-10-08 DIAGNOSIS — G90.9 AUTONOMIC DYSFUNCTION: ICD-10-CM

## 2019-10-08 PROCEDURE — 77063 BREAST TOMOSYNTHESIS BI: CPT

## 2019-10-08 PROCEDURE — 99213 OFFICE O/P EST LOW 20 MIN: CPT | Performed by: OTOLARYNGOLOGY

## 2019-10-08 PROCEDURE — 31505 DIAGNOSTIC LARYNGOSCOPY: CPT | Performed by: OTOLARYNGOLOGY

## 2019-10-08 RX ORDER — ONDANSETRON 4 MG/1
4 TABLET, FILM COATED ORAL EVERY 8 HOURS PRN
Qty: 18 TABLET | Refills: 1 | Status: SHIPPED | OUTPATIENT
Start: 2019-10-08 | End: 2022-06-08

## 2019-10-08 RX ORDER — TRIAMCINOLONE ACETONIDE 55 UG/1
2 SPRAY, METERED NASAL 2 TIMES DAILY
Qty: 2 BOTTLE | Refills: 3 | Status: SHIPPED | OUTPATIENT
Start: 2019-10-08 | End: 2020-11-19

## 2019-10-08 NOTE — PATIENT INSTRUCTIONS
Zofran for nausea, use 4-5 times as needed for Nausea  Nasacort for nose instead of Flonase  Nasal saline  Humidifier    Voice hygiene    RECOMMENDATIONS FOR COLDS, FLU, CONGESTION    Symptoms of Flu and colds are very common and frequently are treated with good old-fashioned remedies. We strongly support the use of these safe and effective remedies for the treatment of many ailments, either alone or in conjunction with prescribed medications. Please feel free to use the following recommendations (and your common sense) to treat certain problems at home.    -Acetominphen(Tylenol)- reduces fever and reduces pain. Please be careful of many over the counter products, which contain this, and follow dosage recommendations.    -Ibuprofen (Advil)- reduces pain, fever and inflammation. May cause stomach upset. This is also in many over the counter products, so follow dosing recommendations.    -Aspirin- While Aspirin has been used for ages, most health care experts feels there is an increased risk of Reye’s Syndrome when used for colds, especially in children. There are other associated problems with Aspirin use as well.     -Robitussin- wonderful mucus thinner. Can cause mild nausea, and must be taken in rather large doses (adults 3 tablespoons, 4 times daily, for children ask).    -Water- Great for reducing fever and maintaining hydration, especially when ill. Water is great for thinning secretions (oral, sinus, etc.) and should be part of any treatment or diet. You can almost never drink too much.    -Benadryl- A wonderful antihistamine for drying up the nose. This can be used safely with a number of other medications. The only drawback is that it can cause some sleepiness, but this can be used to one’s advantage at bedtime or for rest at any time.    -Saline rinses- Saline spray can be used quite frequently in the nose. This loosens crust and thins secretions, as well as washing out the nose. For dry, crusty noses,  copious secretions or allergic noses, this treatment is indispensable in allergy and colds.    -Warm salt-water gargles- great for relieving sore throats and healing surgery of the throats. We have a recipe that is inexpensive and easy to use. Please ask about this.    -Humidifier or vaporizer- a great symptom reliever, especially when dryness is a problem. Use either warm or cold, whichever is more comfortable.    -Vicks inhaler- safe and easy decongestant.     -Vicks vapor rub- great decongestant.    -Warm steamy showers- great for opening heads and loosening congestion.    -Chloraseptic- topical painkiller for the throat that is fairly safe to use.    There are many home remedies for treating many conditions. Please share yours with us to help other patients.

## 2019-10-08 NOTE — PROGRESS NOTES
ENT Procedure Note    Anesthesia: none    Endoscopy Type: Indirect Laryngoscopy    Indications for Procedure:     Procedure Details:    The patient was placed in an upright position.  The mirror was used to visulaize the hypopharyn and larynx.  The nasal cavities, nasopharynx, oropharynx, hypopharynx, and larynx were all examined.  Vocal cords were examined during respiration and phonation.  The following findings were noted:    Findings:   LARYNGOSCOPY FINDINGS :    Normal Nasopharynx, Adenoids, Eustachian tubes, BOT, lingual tonsils, no tongue prolapse, OP walls intact, HP normal and intact, Epiglottis normal, Supraglottis normal, FVC and TVCs normal with no lesions, Subglottis clear    Condition:  Stable.  Patient tolerated procedure well.    Complications:  None    Post-procedure instructions reviewed with Patient, Spouse  Instructions documented in AVS for patient to review

## 2019-10-08 NOTE — PROGRESS NOTES
Nisreen Bernabe LPN   Patient Intake Note    Review of Systems  Review of Systems   Constitutional: Positive for fatigue. Negative for chills and fever.   HENT:        See HPI   Respiratory: Positive for cough. Negative for choking and shortness of breath.    Gastrointestinal: Positive for nausea. Negative for diarrhea and vomiting.   Neurological: Positive for dizziness. Negative for light-headedness and headaches.   Psychiatric/Behavioral: Negative for sleep disturbance.       QUALITY MEASURES    Tobacco Use: Screening and Cessation Intervention  Social History    Tobacco Use      Smoking status: Never Smoker      Smokeless tobacco: Never Used        Nisreen Bernabe LPN  10/8/2019  10:37 AM

## 2019-10-25 ENCOUNTER — OFFICE VISIT (OUTPATIENT)
Dept: OTOLARYNGOLOGY | Facility: CLINIC | Age: 49
End: 2019-10-25

## 2019-10-25 VITALS
SYSTOLIC BLOOD PRESSURE: 120 MMHG | TEMPERATURE: 97 F | DIASTOLIC BLOOD PRESSURE: 64 MMHG | WEIGHT: 152.2 LBS | BODY MASS INDEX: 24.57 KG/M2 | HEART RATE: 70 BPM

## 2019-10-25 DIAGNOSIS — J32.9 CHRONIC SINUSITIS, UNSPECIFIED LOCATION: ICD-10-CM

## 2019-10-25 DIAGNOSIS — R49.8 VOICE STRAIN: ICD-10-CM

## 2019-10-25 DIAGNOSIS — G90.9 AUTONOMIC DYSFUNCTION: ICD-10-CM

## 2019-10-25 DIAGNOSIS — R49.0 DYSPHONIA: Primary | ICD-10-CM

## 2019-10-25 DIAGNOSIS — R09.82 POST-NASAL DRIP: ICD-10-CM

## 2019-10-25 PROCEDURE — 99213 OFFICE O/P EST LOW 20 MIN: CPT | Performed by: OTOLARYNGOLOGY

## 2019-10-25 NOTE — PATIENT INSTRUCTIONS
Continue Voice hygiene    Continue voice therapy    Thank you for enrolling in [employer]. Please follow the instructions below to securely access your online medical record. [facility] allows you to send messages to your doctor, view your test results, renew your prescriptions, schedule appointments, and more.    How Do I Sign Up?  1. In your Internet browser, go to [email or web address]  2. Click on the Sign Up Now link in the New User? box.   3. Enter your [employer] Activation Code exactly as it appears below. You will not need to use this code after you have completed the sign-up process. If you do not sign up before the expiration date, you must request a new code.  [employer] Activation Code: Activation code not generated  Current [facility] Status: Patient Declined    4. Enter the last four digits of your Social Security Number and your Date of Birth as indicated and click Next. You will be taken to the next sign-up page.  5. Create a [employer] username. Think of one that is secure and easy to remember.  6. Create a [employer] password. You can change your password at any time.  7. Choose a security question, enter your answer, and click Next. This can be used to access [facility] if you forget your password.   8. Select your communication preference. Enter a valid e-mail address if you would like to receive e-mail notifications when new information is available in [facility].  9. Click Sign In. You can now view your medical record.     Additional Information  If you have questions, you can e-mail [email or web address], or call [phone] to talk to our [employer] staff. Remember, [employer] is NOT to be used for urgent needs. For medical emergencies, dial 911.

## 2019-10-25 NOTE — PROGRESS NOTES
Nisreen Bernabe LPN   Patient Intake Note    Review of Systems  Review of Systems   Constitutional: Negative for chills, fatigue and fever.   HENT:        See HPI   Respiratory: Positive for cough. Negative for choking, chest tightness and shortness of breath.    Gastrointestinal: Negative for diarrhea, nausea and vomiting.   Neurological: Negative for dizziness, light-headedness and headaches.   Psychiatric/Behavioral: Negative for sleep disturbance.       QUALITY MEASURES    Tobacco Use: Screening and Cessation Intervention  Social History    Tobacco Use      Smoking status: Never Smoker      Smokeless tobacco: Never Used        Nisreen Bernabe LPN  10/25/2019  3:38 PM

## 2019-10-25 NOTE — PROGRESS NOTES
Kike Olivarez Jr, MD     FOLLOW UP NOTE     Chief Complaint   Patient presents with   • Follow-up        HISTORY OF PRESENT ILLNESS:   Accompanied by:    Mariann Portillo is a  49 y.o. female who is here for follow up. She is better.  She has voice now. She has some aching. She will talk with air gong through nose to remind not to strain.    Review of Systems  Reviewed per patient intake note and confirmed by me    Past History:  Past medical and surgical history, family history and social history reviewed and updated when appropriate.  Current medications and allergies reviewed and updated when appropriate.  Allergies:  Decongestant [pseudoephedrine]; Codimal-a [brompheniramine]; Levaquin [levofloxacin]; and Percocet [oxycodone-acetaminophen]        Vital Signs:   Temp:  [97 °F (36.1 °C)] 97 °F (36.1 °C)  Heart Rate:  [70] 70  BP: (120)/(64) 120/64    EXAMINATION:   CONSTITUTIONAL:   well nourished, well-developed, alert, oriented, in no acute distress   normal weight for height  COMMUNICATION AND VOICE:   Poor voice but better since last visit  HEAD:   atraumatic  structure normal, no tenderness on palpation, no lesions, no evidence of trauma  SALIVARY GLANDS: parotid glands with no tenderness, no swelling, no masses, submandibular glands with normal size, nontender  BILATERAL: intact  EYES: ocular motility normal, eyelids normal, orbits normal, no proptosis, conjunctiva normal, sclera non-icteric, pupils equal, round   Color- brown   HEARING: Response to conversational voice normal bilaterally  EARS:  external ears normal to inspection and palpation, no tenderness or erythema  canals clear, normal cerumen and skin, without drainage, skin intact and healthy  tympanic membranes clear, normal landmarks and light reflex  normal light reflex and landmarks  middle ear without fluid, ossicular chain intact  no mastoid process tenderness    NOSE:  APPEARANCE: normal, straight, with good projection, no tenderness,  no lesions, no tenderness  NOSE INTERNAL:  ABNORMAL: pale, mod edema of mucosa, turbinates peal and mildly enlarged, Septum wide with DNS CL to R low mild  ORAL CAVITY:  LIPS: structure normal, no tenderness on palpation, no lesions, no evidence of trauma, normal mobility and oral competence  TEETH: dentition within normal limits for age  GUMS: gingivae healthy, no lesions  ORAL MUCOSA: oral mucosa normal, no mucosal lesions  FLOOR OF MOUTH: Warthin's duct patent, mucosa normal  TONGUE: lingual mucosa normal without lesions, normal tongue mobility  OROPHARYNX: oropharyngeal mucosa normal, tonsil fossa with normal appearance  bilateral  NECK: normal appearance, no masses, no lesions, larynx normal mobility, trachea midline  LYMPH NODES:  no adenopathy  THYROID: no overt thyromegaly, no tenderness, nodules or mass present on palpation, position midline   CHEST/RESPIRATORY: respiratory effort normal, no rales, rubs or wheezing  CARDIOVASCULAR: regular rate and rhythm, no murmurs, gallups, no peripheral edema  NEUROLOGIC/PSYCHIATRIC: oriented appropriately for age, mood normal, affect appropriate, cranial nerves intact grossly unless specifically mentioned     VOICE:   raspy-  minimal but coughing    padgett-  minimal    intensity: almost normal     RESULTS REVIEW:    I reviewed the patient's new clinical results.          ASSESSMENT:      Diagnosis Plan   1. Dysphonia     2. Voice strain     3. Chronic sinusitis, unspecified location     4. Autonomic dysfunction     5. Post-nasal drip             PLAN:   Conservative management.  Patient has significant improvement in voice. She is still coughing. She will follow plan outlined in the notes from [facility].  Nasal saline  Voice hygiene  MY CHART:  Patient is Encouraged to enroll in My Chart  Encouraged to review data and findings in My Chart          Patient understand(s) and agree(s) with the treatment plan as described.    Return in about 3  months (around 1/25/2020) for Recheck voice.     Kike Olivarez Jr, MD  10/25/19  3:55 PM

## 2019-11-21 ENCOUNTER — HOSPITAL ENCOUNTER (OUTPATIENT)
Dept: CT IMAGING | Age: 49
Discharge: HOME | End: 2019-11-21
Payer: COMMERCIAL

## 2019-11-21 DIAGNOSIS — R91.1 LUNG NODULE: ICD-10-CM

## 2019-11-21 PROCEDURE — 71260 CT THORAX DX C+: CPT

## 2019-11-21 PROCEDURE — 6360000004 HC RX CONTRAST MEDICATION: Performed by: FAMILY MEDICINE

## 2019-11-21 RX ADMIN — IOPAMIDOL 75 ML: 755 INJECTION, SOLUTION INTRAVENOUS at 16:22

## 2019-12-04 ENCOUNTER — DOCUMENTATION (OUTPATIENT)
Dept: PHYSICAL THERAPY | Facility: CLINIC | Age: 49
End: 2019-12-04

## 2019-12-04 DIAGNOSIS — R49.0 MUSCLE TENSION DYSPHONIA: Primary | ICD-10-CM

## 2019-12-04 NOTE — PROGRESS NOTES
Outpatient Physical Therapy Discharge Summary         Patient Name: Mariann Portillo  : 1970  MRN: [id number]    Today's Date: 2019    Visit Dx:    ICD-10-CM ICD-9-CM   1. Muscle tension dysphonia R49.0 784.42       PT OP Goals     Row Name 19 1400          Long Term Goals    LTG Date to Achieve  19  -TB     LTG 1  Able to coordinate abdominal support without adding neck tension  -TB     LTG 1 Progress  Met  -TB     LTG 2  Demonstrate improved posture with better cervical alignment  -TB     LTG 2 Progress  Partially Met  -TB     LTG 2 Progress Comments  discussed posture throughout  -TB     LTG 3  Demonstrate improve coordination of diaphragmatic breathing and rib excursion  -TB     LTG 3 Progress  Progressing  -TB     LTG 3 Progress Comments  needed some cues but improved  -TB     LTG 4  Able to coordinate breathing and support with improved placement of sound in resonators without tensing neck  -TB     LTG 4 Progress  Partially Met  -TB     LTG 4 Progress Comments  we had just started with adding resonators in her vocalizing  -TB     LTG 5  Demonstrate relaxation techniques for jaw mms and tongue  -TB     LTG 5 Progress  Partially Met  -TB     LTG 5 Progress Comments  worked on breathing techaniques to keep from over using accessory mms  -TB       User Key  (r) = Recorded By, (t) = Taken By, (c) = Cosigned By    Initials Name Provider Type    TB Wiyl Hernandez, PT Physical Therapist          OP PT Discharge Summary  Date of Discharge: 19  Outcomes Achieved: Patient able to partially acheive established goals  Discharge Destination: Home with home program  Discharge Instructions/Additional Comments: Patient only came for 2 PT visits and did not return after this. We began with working on proper breathing and breath support to allow her neck to not have tension. On her second visit, we had just started to add light vocalizations wtih gentle humming using her nasal resonators and  incorporating her breathing and support. She did not return after her second visit.      Time Calculation:                    Wily Hernandez, PT  12/4/2019

## 2019-12-12 ENCOUNTER — TRANSCRIBE ORDERS (OUTPATIENT)
Dept: PHYSICAL THERAPY | Facility: CLINIC | Age: 49
End: 2019-12-12

## 2019-12-12 DIAGNOSIS — M54.2 CERVICALGIA: Primary | ICD-10-CM

## 2019-12-23 ENCOUNTER — TREATMENT (OUTPATIENT)
Dept: PHYSICAL THERAPY | Facility: CLINIC | Age: 49
End: 2019-12-23

## 2019-12-23 DIAGNOSIS — R49.0 MUSCLE TENSION DYSPHONIA: ICD-10-CM

## 2019-12-23 DIAGNOSIS — M54.2 CERVICALGIA: Primary | ICD-10-CM

## 2019-12-23 PROCEDURE — 97140 MANUAL THERAPY 1/> REGIONS: CPT | Performed by: PHYSICAL THERAPIST

## 2019-12-23 PROCEDURE — 97161 PT EVAL LOW COMPLEX 20 MIN: CPT | Performed by: PHYSICAL THERAPIST

## 2019-12-23 NOTE — PROGRESS NOTES
Outpatient Physical Therapy Ortho Initial Evaluation       Patient Name: Mariann Portillo  : 1970  MRN: [id number]  Today's Date: 2019      Visit Date: 2019    There is no problem list on file for this patient.       Past Medical History:   Diagnosis Date   • Allergic rhinitis    • Asthma    • Chronic UTI    • Endometriosis    • Fibromyalgia    • High cholesterol    • IBS (irritable bowel syndrome)    • Meniere disease    • Migraines         Past Surgical History:   Procedure Laterality Date   • CHOLECYSTECTOMY     • CYST REMOVAL     • ENDOMETRIAL BIOPSY         Visit Dx:     ICD-10-CM ICD-9-CM   1. Cervicalgia M54.2 723.1   2. Muscle tension dysphonia R49.0 784.42         Patient History     Row Name 19 1000             History    Date Current Problem(s) Began  19  -TB      Brief Description of Current Complaint  In January, she was sick. She struggled speaking through that and after she got better, she struggled still with speaking. She started SLP last month but is still having problems. SLP recommended she start PT as well to help wiht the tension. She feels like she has to push to get her voice to work. Her voice will fatigue the more she talks. She denies any problems with reflux. She had started PT and was improving but then got sick again and couldn't speak for 2 months. She went to the [facility] who agreed with the initial diagnosis and referred for PT.   -TB         Pain     Pain Location  Head  -TB      Pain at Worst  10  -TB      Pain Frequency  Once a week  -TB         Daily Activities    Pt Participated in POC and Goals  Yes  -TB        User Key  (r) = Recorded By, (t) = Taken By, (c) = Cosigned By    Initials Name Provider Type    TB Wily Hernandez PT Physical Therapist          PT Ortho     Row Name 19 1100       Posture/Observations    Posture/Observations Comments  has been working on posture  -TB       General ROM    GENERAL ROM COMMENTS   cervical ROM actively WFL all planes  -TB       Sensation    Sensation WNL?  WNL  -TB       Pathomechanics    Pathomechanics Comments  has kept her diaphragmatic breathing but struggled more with rib excursion with inhalation;   -TB      User Key  (r) = Recorded By, (t) = Taken By, (c) = Cosigned By    Initials Name Provider Type    TB Wily Hernandez, PT Physical Therapist                      Therapy Education  Education Details: Cont with breathing and light sirens with emphasis on nasal resonance and no neck tension  Given: HEP  Program: New  How Provided: Verbal, Demonstration(she has videoed me demonstrating)  Provided to: Patient  Level of Understanding: Teach back education performed, Verbalized, Demonstrated     PT OP Goals     Row Name 12/23/19 1000          Long Term Goals    LTG Date to Achieve  09/13/19  -TB     LTG 1  Able to coordinate abdominal support without adding neck tension  -TB     LTG 1 Progress  New  -TB     LTG 2  Demonstrate improved posture with better cervical alignment  -TB     LTG 2 Progress  New  -TB     LTG 3  Demonstrate improve coordination of diaphragmatic breathing and rib excursion  -TB     LTG 3 Progress  New  -TB     LTG 4  Able to coordinate breathing and support with improved placement of sound in resonators without tensing neck  -TB     LTG 4 Progress  New  -TB     LTG 5  Demonstrate relaxation techniques for jaw mms and tongue  -TB     LTG 5 Progress  New  -TB     LTG 6  Able to speak for 15 minutes or more without fatigue  -TB     LTG 6 Progress  New  -TB        Time Calculation    PT Goal Re-Cert Due Date  01/22/20  -TB       User Key  (r) = Recorded By, (t) = Taken By, (c) = Cosigned By    Initials Name Provider Type    TB Wily Hernandez, PT Physical Therapist          PT Assessment/Plan     Row Name 12/23/19 1100          PT Assessment    Functional Limitations  Limitation in home management;Performance in leisure activities  -TB     Impairments  Impaired postural  "alignment  -TB     Assessment Comments  I had been seeing her for a few visits related to this about 3 months ago before she stopped after getting sick again. The encouraging thing is that she hasn't lost what we had started on with diaphragmatic breathing and abdominal support. We had just started to work on vocalizing without adding neck tension when she got sick again. Our focus early has been on building a better foundation for vocalizing with better posture, breathing and support while being able to keep her neck relaxed. Her tendency would be to tense her neck with all these activities. As this improves, if she can learn to use her support and resonators better with speaking to project better without having to \"yell\", this should help her to be able to speak louder and longer without fatigue.   -TB     Rehab Potential  Good  -TB     Patient/caregiver participated in establishment of treatment plan and goals  Yes  -TB     Patient would benefit from skilled therapy intervention  Yes  -TB        PT Plan    PT Frequency  2x/week  -TB     Predicted Duration of Therapy Intervention (Therapy Eval)  8 weeks  -TB     Planned CPT's?  PT EVAL LOW COMPLEXITY: 17962;PT THER PROC EA 15 MIN: 35219;PT THER ACT EA 15 MIN: 14241;PT MANUAL THERAPY EA 15 MIN: 46012  -TB     PT Plan Comments  Continue with myofascial release around her neck, upper traps, anterior deep neck flexors and jaw. Continue with training in breathing and abodminal support and slowly progress using this support with vocalizing with proper resonators to add projections without neck tension. Progress her HEP for the same.   -TB       User Key  (r) = Recorded By, (t) = Taken By, (c) = Cosigned By    Initials Name Provider Type    TB Wily Hernandez, PT Physical Therapist            OP Exercises     Row Name 12/23/19 1000             Total Minutes    96087 - PT Manual Therapy Minutes  15  -TB        User Key  (r) = Recorded By, (t) = Taken By, (c) = Cosigned " By    Initials Name Provider Type    Wily Roberts, PT Physical Therapist           Manual Rx (last 36 hours)      Manual Treatments     Row Name 12/23/19 1000             Total Minutes    39670 - PT Manual Therapy Minutes  15  -TB         Manual Rx 1    Manual Rx 1 Location  prone CT tension  -TB      Manual Rx 1 Type  mob  -TB         Manual Rx 2    Manual Rx 2 Location  prone gunjan UT/LS  -TB      Manual Rx 2 Type  STM  -TB         Manual Rx 3    Manual Rx 3 Location  supine gunjan SCM, anterior cx mms and digastric and gunjan masseters  -TB      Manual Rx 3 Type  STM  -TB        User Key  (r) = Recorded By, (t) = Taken By, (c) = Cosigned By    Initials Name Provider Type    TB Wily Hernandez, PT Physical Therapist                                Time Calculation:                         Wily Hernandez, PT  12/23/2019

## 2019-12-27 ENCOUNTER — TREATMENT (OUTPATIENT)
Dept: PHYSICAL THERAPY | Facility: CLINIC | Age: 49
End: 2019-12-27

## 2019-12-27 DIAGNOSIS — M54.2 CERVICALGIA: Primary | ICD-10-CM

## 2019-12-27 DIAGNOSIS — R49.0 MUSCLE TENSION DYSPHONIA: ICD-10-CM

## 2019-12-27 PROCEDURE — 97140 MANUAL THERAPY 1/> REGIONS: CPT | Performed by: PHYSICAL THERAPIST

## 2019-12-27 NOTE — PROGRESS NOTES
Outpatient Physical Therapy Ortho Treatment Note       Patient Name: Mariann Portillo  : 1970  MRN: [id number]  Today's Date: 2019      Visit Date: 2019    Visit Dx:    ICD-10-CM ICD-9-CM   1. Cervicalgia M54.2 723.1   2. 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       Past Medical History:   Diagnosis Date   • Allergic rhinitis    • Asthma    • Chronic UTI    • Endometriosis    • Fibromyalgia    • High cholesterol    • IBS (irritable bowel syndrome)    • Meniere disease    • Migraines         Past Surgical History:   Procedure Laterality Date   • CHOLECYSTECTOMY     • CYST REMOVAL     • ENDOMETRIAL BIOPSY                         PT Assessment/Plan     Row Name 19 1100          PT Assessment    Assessment Comments  Today we focused more on muscle tension relaxation. With her flare ogf FMS and not sleeping, I didn't push vocalizations as much while she's fatigued.   -TB        PT Plan    PT Plan Comments  Cont muscle relaxation and resume more vocalizations with emphasis on no neck tension  -TB       User Key  (r) = Recorded By, (t) = Taken By, (c) = Cosigned By    Initials Name Provider Type    TB Wily Hernandez, PT Physical Therapist            OP Exercises     Row Name 19 1100 19 1015          Subjective Comments    Subjective Comments  --  She says has her sleep cycle messed up and her FMS has flared now.  -TB        Subjective Pain    Pre-Treatment Pain Level  --  3  -TB        Total Minutes    13728 - PT Manual Therapy Minutes  45  -TB  --       User Key  (r) = Recorded By, (t) = Taken By, (c) = Cosigned By    Initials Name Provider Type    TB Wily Hernandez, PT Physical Therapist                      Manual Rx (last 36 hours)      Manual Treatments     Row Name 19 1100             Total Minutes    60057 - PT Manual Therapy Minutes  45  -TB         Manual Rx 1    Manual Rx 1 Location  prone CT tension  -TB       Manual Rx 1 Type  mob  -TB      Manual Rx 1 Grade  MFR upper thoracic pulling tissue caudally  -TB         Manual Rx 2    Manual Rx 2 Location  prone gunjan UT/LS  -TB      Manual Rx 2 Type  STM  -TB         Manual Rx 3    Manual Rx 3 Location  supine gunjan SCM, anterior cx mms and digastric and gunjan masseters  -TB      Manual Rx 3 Type  STM  -TB        User Key  (r) = Recorded By, (t) = Taken By, (c) = Cosigned By    Initials Name Provider Type    TB Wily Hernandez, PT Physical Therapist          PT OP Goals     Row Name 12/27/19 1100          Long Term Goals    LTG Date to Achieve  09/13/19  -TB     LTG 1  Able to coordinate abdominal support without adding neck tension  -TB     LTG 1 Progress  Ongoing  -TB     LTG 2  Demonstrate improved posture with better cervical alignment  -TB     LTG 2 Progress  Ongoing  -TB     LTG 3  Demonstrate improve coordination of diaphragmatic breathing and rib excursion  -TB     LTG 3 Progress  Ongoing  -TB     LTG 4  Able to coordinate breathing and support with improved placement of sound in resonators without tensing neck  -TB     LTG 4 Progress  Ongoing  -TB     LTG 5  Demonstrate relaxation techniques for jaw mms and tongue  -TB     LTG 5 Progress  Ongoing  -TB     LTG 6  Able to speak for 15 minutes or more without fatigue  -TB     LTG 6 Progress  Ongoing  -TB     LTG 6 Progress Comments  didn't push vocals today due to FMS flare  -TB        Time Calculation    PT Goal Re-Cert Due Date  01/22/19  -TB       User Key  (r) = Recorded By, (t) = Taken By, (c) = Cosigned By    Initials Name Provider Type    TB Wily Hernandez, PT Physical Therapist                         Time Calculation:                    Wily Hernandez, PT  12/27/2019

## 2019-12-30 ENCOUNTER — TREATMENT (OUTPATIENT)
Dept: PHYSICAL THERAPY | Facility: CLINIC | Age: 49
End: 2019-12-30

## 2019-12-30 DIAGNOSIS — R49.0 MUSCLE TENSION DYSPHONIA: ICD-10-CM

## 2019-12-30 DIAGNOSIS — M54.2 CERVICALGIA: Primary | ICD-10-CM

## 2019-12-30 PROCEDURE — 97530 THERAPEUTIC ACTIVITIES: CPT | Performed by: PHYSICAL THERAPIST

## 2019-12-30 PROCEDURE — 97140 MANUAL THERAPY 1/> REGIONS: CPT | Performed by: PHYSICAL THERAPIST

## 2019-12-31 NOTE — PROGRESS NOTES
Outpatient Physical Therapy Ortho Treatment Note       Patient Name: Mariann Portillo  : 1970  MRN: [id number]  Today's Date: 2019      Visit Date: 2019    Visit Dx:    ICD-10-CM ICD-9-CM   1. Cervicalgia M54.2 723.1   2. 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       Past Medical History:   Diagnosis Date   • Allergic rhinitis    • Asthma    • Chronic UTI    • Endometriosis    • Fibromyalgia    • High cholesterol    • IBS (irritable bowel syndrome)    • Meniere disease    • Migraines         Past Surgical History:   Procedure Laterality Date   • CHOLECYSTECTOMY     • CYST REMOVAL     • ENDOMETRIAL BIOPSY                         PT Assessment/Plan     Row Name 19 1430          PT Assessment    Assessment Comments  We are continuing to progress every visit. We progressed to standing vocalization. When she first started, she couldn't do a siren without her voice cutting out. This is still weak but improved. Adding the core support and vocal resonance increased her volume quite a bit.   -TB        PT Plan    PT Plan Comments  cont to work on coordinating breathing and core support with increasing volume.   -TB       User Key  (r) = Recorded By, (t) = Taken By, (c) = Cosigned By    Initials Name Provider Type    TB Wily Hernandez, PT Physical Therapist            OP Exercises     Row Name 19 1438             Subjective Comments    Subjective Comments  She says she has had no problems since her last visit. Her FMS are less severe.  -TB         Subjective Pain    Pre-Treatment Pain Level  3  -TB         Total Minutes    33182 - PT Therapeutic Activity Minutes  15  -TB      69899 - PT Manual Therapy Minutes  30  -TB         Exercise 1    Exercise Name 1  light vocal padgett through gentle siren in supine; cued on deep supportive breath first  -TB         Exercise 2    Exercise Name 2  progressed to standing vocal siren with deep diaphragmatic  breath and TA support with manual OP; her volume double when support added especially with cues for more sinus resonance  -TB        User Key  (r) = Recorded By, (t) = Taken By, (c) = Cosigned By    Initials Name Provider Type    TB Wily Hernandez, PT Physical Therapist                      Manual Rx (last 36 hours)      Manual Treatments     Row Name 12/30/19 1430             Total Minutes    86218 - PT Manual Therapy Minutes  30  -TB         Manual Rx 1    Manual Rx 1 Location  prone CT tension  -TB      Manual Rx 1 Type  mob  -TB      Manual Rx 1 Grade  MFR upper thoracic pulling tissue caudally  -TB         Manual Rx 2    Manual Rx 2 Location  prone gunjan UT/LS  -TB      Manual Rx 2 Type  STM  -TB         Manual Rx 3    Manual Rx 3 Location  supine gunjan SCM, anterior cx mms and digastric and gunjan masseters  -TB      Manual Rx 3 Type  STM  -TB        User Key  (r) = Recorded By, (t) = Taken By, (c) = Cosigned By    Initials Name Provider Type    TB Wily Hernandez, PT Physical Therapist          PT OP Goals     Row Name 12/30/19 1430          Long Term Goals    LTG Date to Achieve  09/13/19  -TB     LTG 1  Able to coordinate abdominal support without adding neck tension  -TB     LTG 1 Progress  Progressing  -TB     LTG 1 Progress Comments  improving with this; rarely see her neck tense up with this  -TB     LTG 2  Demonstrate improved posture with better cervical alignment  -TB     LTG 2 Progress  Ongoing  -TB     LTG 3  Demonstrate improve coordination of diaphragmatic breathing and rib excursion  -TB     LTG 3 Progress  Ongoing  -TB     LTG 4  Able to coordinate breathing and support with improved placement of sound in resonators without tensing neck  -TB     LTG 4 Progress  Ongoing  -TB     LTG 5  Demonstrate relaxation techniques for jaw mms and tongue  -TB     LTG 5 Progress  Ongoing  -TB     LTG 6  Able to speak for 15 minutes or more without fatigue  -TB     LTG 6 Progress  Ongoing  -TB        Time  Calculation    PT Goal Re-Cert Due Date  01/22/20  -TB       User Key  (r) = Recorded By, (t) = Taken By, (c) = Cosigned By    Initials Name Provider Type    TB Wily Hernandez, PT Physical Therapist          Therapy Education  Education Details: progress to standing sirens/vocal padgett with breathing and abdominal support              Time Calculation:                    Wily Hernandez, PT  12/31/2019

## 2020-01-10 ENCOUNTER — TREATMENT (OUTPATIENT)
Dept: PHYSICAL THERAPY | Facility: CLINIC | Age: 50
End: 2020-01-10

## 2020-01-10 DIAGNOSIS — M54.2 CERVICALGIA: Primary | ICD-10-CM

## 2020-01-10 DIAGNOSIS — R49.0 MUSCLE TENSION DYSPHONIA: ICD-10-CM

## 2020-01-10 PROCEDURE — 97140 MANUAL THERAPY 1/> REGIONS: CPT | Performed by: PHYSICAL THERAPIST

## 2020-01-11 LAB — THROAT CULTURE: NORMAL

## 2020-01-11 NOTE — PROGRESS NOTES
Outpatient Physical Therapy Ortho Treatment Note       Patient Name: Mariann Portillo  : 1970  MRN: [id number]  Today's Date: 1/10/2020      Visit Date: 01/10/2020    Visit Dx:    ICD-10-CM ICD-9-CM   1. Cervicalgia M54.2 723.1   2. 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       Past Medical History:   Diagnosis Date   • Allergic rhinitis    • Asthma    • Chronic UTI    • Endometriosis    • Fibromyalgia    • High cholesterol    • IBS (irritable bowel syndrome)    • Meniere disease    • Migraines         Past Surgical History:   Procedure Laterality Date   • CHOLECYSTECTOMY     • CYST REMOVAL     • ENDOMETRIAL BIOPSY                         PT Assessment/Plan     Row Name 01/10/20 1800          PT Assessment    Assessment Comments  She has been sick which set her back quite a bit. We didn't work on voice volume today due to still being sick. I advised her about not speaking especially with volume until the swelling goes down  -TB        PT Plan    PT Plan Comments  Resume vocal training with core support if not sick.  -TB       User Key  (r) = Recorded By, (t) = Taken By, (c) = Cosigned By    Initials Name Provider Type    TB Wily Hernandez, PT Physical Therapist            OP Exercises     Row Name 01/10/20 1800             Total Minutes    37922 - PT Manual Therapy Minutes  40  -TB        User Key  (r) = Recorded By, (t) = Taken By, (c) = Cosigned By    Initials Name Provider Type    TB Wily Hernandez, PT Physical Therapist                      Manual Rx (last 36 hours)      Manual Treatments     Row Name 01/10/20 1800 01/10/20 1550          Total Minutes    70587 - PT Manual Therapy Minutes  40  -TB  --        Manual Rx 1    Manual Rx 1 Location  --  prone CT   -TB     Manual Rx 1 Type  --  mob  -TB     Manual Rx 1 Grade  --  MFR upper thoracic pulling tissue caudally  -TB        Manual Rx 2    Manual Rx 2 Location  --  prone gunjan UT/LS  -TB     Manual  Rx 2 Type  --  STM  -TB        Manual Rx 3    Manual Rx 3 Location  --  supine gunjan SCM, anterior cx mms and digastric and gunjan masseters  -TB     Manual Rx 3 Type  --  STM  -TB        Manual Rx 4    Manual Rx 4 Location  --  supine subocc release  -TB       User Key  (r) = Recorded By, (t) = Taken By, (c) = Cosigned By    Initials Name Provider Type    TB Wily Hernandez, PT Physical Therapist          PT OP Goals     Row Name 01/10/20 1800          Long Term Goals    LTG Date to Achieve  09/13/19  -TB     LTG 1  Able to coordinate abdominal support without adding neck tension  -TB     LTG 1 Progress  Progressing  -TB     LTG 2  Demonstrate improved posture with better cervical alignment  -TB     LTG 2 Progress  Ongoing  -TB     LTG 3  Demonstrate improve coordination of diaphragmatic breathing and rib excursion  -TB     LTG 3 Progress  Ongoing  -TB     LTG 4  Able to coordinate breathing and support with improved placement of sound in resonators without tensing neck  -TB     LTG 4 Progress  Ongoing  -TB     LTG 5  Demonstrate relaxation techniques for jaw mms and tongue  -TB     LTG 5 Progress  Ongoing  -TB     LTG 6  Able to speak for 15 minutes or more without fatigue  -TB     LTG 6 Progress  Ongoing  -TB     LTG 6 Progress Comments  voice much weaker after being sick  -TB        Time Calculation    PT Goal Re-Cert Due Date  01/22/20  -TB       User Key  (r) = Recorded By, (t) = Taken By, (c) = Cosigned By    Initials Name Provider Type    TB Wily Hernandez, PT Physical Therapist                         Time Calculation:                    Wily Hernandez, PT  1/10/2020

## 2020-01-17 ENCOUNTER — TREATMENT (OUTPATIENT)
Dept: PHYSICAL THERAPY | Facility: CLINIC | Age: 50
End: 2020-01-17

## 2020-01-17 DIAGNOSIS — M54.2 CERVICALGIA: Primary | ICD-10-CM

## 2020-01-17 DIAGNOSIS — R49.0 MUSCLE TENSION DYSPHONIA: ICD-10-CM

## 2020-01-17 PROCEDURE — 97140 MANUAL THERAPY 1/> REGIONS: CPT | Performed by: PHYSICAL THERAPIST

## 2020-01-18 NOTE — PROGRESS NOTES
Outpatient Physical Therapy Ortho Treatment Note       Patient Name: Mariann Portillo  : 1970  MRN: [id number]  Today's Date: 2020      Visit Date: 2020    Visit Dx:    ICD-10-CM ICD-9-CM   1. Cervicalgia M54.2 723.1   2. 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       Past Medical History:   Diagnosis Date   • Allergic rhinitis    • Asthma    • Chronic UTI    • Endometriosis    • Fibromyalgia    • High cholesterol    • IBS (irritable bowel syndrome)    • Meniere disease    • Migraines         Past Surgical History:   Procedure Laterality Date   • CHOLECYSTECTOMY     • CYST REMOVAL     • ENDOMETRIAL BIOPSY                         PT Assessment/Plan     Row Name 20 1545          PT Assessment    Assessment Comments  The encouraging thing is she is recovering faster from this cold than the last time she got sick where she lost her voice for months. She is ready to start lightly vocalizing again.  -TB        PT Plan    PT Plan Comments  Progress back to adding support and volume to vocalizations if her voice and cold are better  -TB       User Key  (r) = Recorded By, (t) = Taken By, (c) = Cosigned By    Initials Name Provider Type    TB Wily Hernandez, PT Physical Therapist            OP Exercises     Row Name 20 1550          Subjective Comments    Subjective Comments  --  She feels like her cold is slowly improving.  -TB        Subjective Pain    Subjective Pain Comment  --  c/o sore throat from her cold  -TB        Total Minutes    49329 - PT Manual Therapy Minutes  45  -TB  --       User Key  (r) = Recorded By, (t) = Taken By, (c) = Cosigned By    Initials Name Provider Type    Wily Roberts, PT Physical Therapist                      Manual Rx (last 36 hours)      Manual Treatments     Row Name 20 1545          Total Minutes    49649 - PT Manual Therapy Minutes  45  -TB  --        Manual Rx 1     Manual Rx 1 Location  --  prone CT   -TB     Manual Rx 1 Type  --  mob  -TB     Manual Rx 1 Grade  --  MFR upper thoracic pulling tissue caudally  -TB     Manual Rx 1 Duration  --  10  -TB        Manual Rx 2    Manual Rx 2 Location  --  prone gunjan UT/LS  -TB     Manual Rx 2 Type  --  STM  -TB     Manual Rx 2 Grade  --  subocc  -TB     Manual Rx 2 Duration  --  20  -TB        Manual Rx 3    Manual Rx 3 Location  --  supine gunjan SCM, anterior cx mms and digastric and gunjan masseters  -TB     Manual Rx 3 Type  --  STM  -TB        Manual Rx 4    Manual Rx 4 Location  --  supine subocc release  -TB       User Key  (r) = Recorded By, (t) = Taken By, (c) = Cosigned By    Initials Name Provider Type    TB Wily Hernandez, PT Physical Therapist          PT OP Goals     Row Name 01/17/20 2000          Long Term Goals    LTG Date to Achieve  09/13/19  -TB     LTG 1  Able to coordinate abdominal support without adding neck tension  -TB     LTG 1 Progress  Progressing  -TB     LTG 2  Demonstrate improved posture with better cervical alignment  -TB     LTG 2 Progress  Ongoing  -TB     LTG 3  Demonstrate improve coordination of diaphragmatic breathing and rib excursion  -TB     LTG 3 Progress  Ongoing  -TB     LTG 4  Able to coordinate breathing and support with improved placement of sound in resonators without tensing neck  -TB     LTG 4 Progress  Ongoing  -TB     LTG 4 Progress Comments  just started vocalizing again after cold  -TB     LTG 5  Demonstrate relaxation techniques for jaw mms and tongue  -TB     LTG 5 Progress  Ongoing  -TB     LTG 6  Able to speak for 15 minutes or more without fatigue  -TB     LTG 6 Progress  Ongoing  -TB        Time Calculation    PT Goal Re-Cert Due Date  01/22/20  -TB       User Key  (r) = Recorded By, (t) = Taken By, (c) = Cosigned By    Initials Name Provider Type    TB Wily Hernandez, PT Physical Therapist          Therapy Education  Education Details: resume light vocal dayron rayo               Time Calculation:                    Wily Hernandez, PT  1/17/2020

## 2020-01-24 ENCOUNTER — TREATMENT (OUTPATIENT)
Dept: PHYSICAL THERAPY | Facility: CLINIC | Age: 50
End: 2020-01-24

## 2020-01-24 DIAGNOSIS — R49.0 MUSCLE TENSION DYSPHONIA: ICD-10-CM

## 2020-01-24 DIAGNOSIS — M54.2 CERVICALGIA: Primary | ICD-10-CM

## 2020-01-24 PROCEDURE — 97110 THERAPEUTIC EXERCISES: CPT | Performed by: PHYSICAL THERAPIST

## 2020-01-24 PROCEDURE — 97140 MANUAL THERAPY 1/> REGIONS: CPT | Performed by: PHYSICAL THERAPIST

## 2020-01-25 NOTE — PROGRESS NOTES
Outpatient Physical Therapy Ortho Treatment Note       Patient Name: Mariann Portillo  : 1970  MRN: [id number]  Today's Date: 2020      Visit Date: 2020    Visit Dx:    ICD-10-CM ICD-9-CM   1. Cervicalgia M54.2 723.1   2. 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       Past Medical History:   Diagnosis Date   • Allergic rhinitis    • Asthma    • Chronic UTI    • Endometriosis    • Fibromyalgia    • High cholesterol    • IBS (irritable bowel syndrome)    • Meniere disease    • Migraines         Past Surgical History:   Procedure Laterality Date   • CHOLECYSTECTOMY     • CYST REMOVAL     • ENDOMETRIAL BIOPSY                         PT Assessment/Plan     Row Name 20 1800          PT Assessment    Functional Limitations  Limitation in home management;Performance in leisure activities  -TB     Impairments  Impaired postural alignment  -TB     Assessment Comments  She was progressing well but got sick again which set her back. We have been working on relaxing her neck and through and have now resumed vocalizations with adding support now for volume. She is doing well with her resonance and is learning to consistently keep relaxed as she adds layers. I anticipate she will contiue to improve.   -TB     Rehab Potential  Good  -TB     Patient/caregiver participated in establishment of treatment plan and goals  Yes  -TB     Patient would benefit from skilled therapy intervention  Yes  -TB        PT Plan    PT Frequency  1x/week;2x/week  -TB     Predicted Duration of Therapy Intervention (Therapy Eval)  4 weeks  -TB     Planned CPT's?  PT THER PROC EA 15 MIN: 40299;PT THER ACT EA 15 MIN: 38337;PT MANUAL THERAPY EA 15 MIN: 84182  -TB     PT Plan Comments  Cont to progress with adding support and volume with shaping her mouth for resonance and no tension.  -TB       User Key  (r) = Recorded By, (t) = Taken By, (c) = Cosigned By    Initials Name Provider  Type    TB Wily Hernandez, PT Physical Therapist            OP Exercises     Row Name 01/24/20 1700             Subjective Comments    Subjective Comments  She feels like she is getting over her cold well.   -TB         Subjective Pain    Pre-Treatment Pain Level  0  -TB         Total Minutes    43256 - PT Therapeutic Exercise Minutes  10  -TB      46621 - PT Manual Therapy Minutes  35  -TB         Exercise 1    Exercise Name 1  supine deep breathing with light vocalization; cued on keeping her jaw relaxed and opening her pharynx more  -TB         Exercise 2    Exercise Name 2  standing vocalization adding more volume with abdominal support; she would tend to tighten her jaw and keep her mouth closed; early, she also raised her shoulders; she was eventually able to hold a straight tone  -TB        User Key  (r) = Recorded By, (t) = Taken By, (c) = Cosigned By    Initials Name Provider Type    Wily Roberts, PT Physical Therapist                      Manual Rx (last 36 hours)      Manual Treatments     Row Name 01/24/20 1700             Total Minutes    79877 - PT Manual Therapy Minutes  35  -TB         Manual Rx 1    Manual Rx 1 Location  prone CT   -TB      Manual Rx 1 Type  mob  -TB      Manual Rx 1 Grade  MFR upper thoracic pulling tissue caudally  -TB      Manual Rx 1 Duration  10  -TB         Manual Rx 2    Manual Rx 2 Location  prone gunjan UT/LS  -TB      Manual Rx 2 Type  STM  -TB      Manual Rx 2 Grade  subocc  -TB      Manual Rx 2 Duration  20  -TB         Manual Rx 3    Manual Rx 3 Location  supine gunjan SCM, anterior cx mms and digastric and gunjan masseters  -TB      Manual Rx 3 Type  STM  -TB         Manual Rx 4    Manual Rx 4 Location  supine subocc release  -TB        User Key  (r) = Recorded By, (t) = Taken By, (c) = Cosigned By    Initials Name Provider Type    Wily Roberts, PT Physical Therapist          PT OP Goals     Row Name 01/24/20 1700          Long Term Goals    LTG Date to Achieve   02/07/20  -TB     LTG 1  Able to coordinate abdominal support without adding neck tension  -TB     LTG 1 Progress  Progressing  -TB     LTG 1 Progress Comments  She still needs occasional cues but much improved  -TB     LTG 2  Demonstrate improved posture with better cervical alignment  -TB     LTG 2 Progress  Met  -TB     LTG 2 Progress Comments  her posture is aligned   -TB     LTG 3  Demonstrate improve coordination of diaphragmatic breathing and rib excursion  -TB     LTG 3 Progress  Progressing  -TB     LTG 3 Progress Comments  still needs occasional cues but improving with consistency  -TB     LTG 4  Able to coordinate breathing and support with improved placement of sound in resonators without tensing neck  -TB     LTG 4 Progress  Progressing  -TB     LTG 4 Progress Comments  she is finding her resonance in her nasal area to give volume and avoid tension  -TB     LTG 5  Demonstrate relaxation techniques for jaw mms and tongue  -TB     LTG 5 Progress  Progressing  -TB     LTG 5 Progress Comments  still needs cues and tends to tighten her jaw but working on relaxing  -TB     LTG 6  Able to speak for 15 minutes or more without fatigue  -TB     LTG 6 Progress  Ongoing  -TB     LTG 6 Progress Comments  voice fatigued while reading an article to   -TB        Time Calculation    PT Goal Re-Cert Due Date  02/23/20  -TB       User Key  (r) = Recorded By, (t) = Taken By, (c) = Cosigned By    Initials Name Provider Type    TB Wily Hernandez, PT Physical Therapist          Therapy Education  Education Details: add more volume with abdominal support.               Time Calculation:                    Wily Hernandez, PT  1/24/2020

## 2020-01-30 ENCOUNTER — TREATMENT (OUTPATIENT)
Dept: PHYSICAL THERAPY | Facility: CLINIC | Age: 50
End: 2020-01-30

## 2020-01-30 DIAGNOSIS — R49.0 MUSCLE TENSION DYSPHONIA: ICD-10-CM

## 2020-01-30 DIAGNOSIS — M54.2 CERVICALGIA: Primary | ICD-10-CM

## 2020-01-30 PROCEDURE — 97110 THERAPEUTIC EXERCISES: CPT | Performed by: PHYSICAL THERAPIST

## 2020-01-30 PROCEDURE — 97140 MANUAL THERAPY 1/> REGIONS: CPT | Performed by: PHYSICAL THERAPIST

## 2020-01-31 NOTE — PROGRESS NOTES
Outpatient Physical Therapy Ortho Treatment Note       Patient Name: Mariann Portillo  : 1970  MRN: [id number]  Today's Date: 2020      Visit Date: 2020    Visit Dx:    ICD-10-CM ICD-9-CM   1. Cervicalgia M54.2 723.1   2. 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       Past Medical History:   Diagnosis Date   • Allergic rhinitis    • Asthma    • Chronic UTI    • Endometriosis    • Fibromyalgia    • High cholesterol    • IBS (irritable bowel syndrome)    • Meniere disease    • Migraines         Past Surgical History:   Procedure Laterality Date   • CHOLECYSTECTOMY     • CYST REMOVAL     • ENDOMETRIAL BIOPSY                         PT Assessment/Plan     Row Name 20          PT Assessment    Assessment Comments  She still struggles with louder vocalizations as she tends to revert back to tightening her throat and loses her resonance. I'm also cuing her to open her mouth more with vocalizations to give the sound room to come out.   -TB        PT Plan    PT Plan Comments  cont emphasis on sound placement with relaxation of her throat and jaw with core support for volume  -TB       User Key  (r) = Recorded By, (t) = Taken By, (c) = Cosigned By    Initials Name Provider Type    TB Wily Hernandez, PT Physical Therapist            OP Exercises     Row Name 20             Subjective Comments    Subjective Comments  She has been practicing her vocalizaitons. She hasn't had a chance to try extended talking to see if it fatigues.   -TB         Subjective Pain    Pre-Treatment Pain Level  0  -TB         Total Minutes    38786 - PT Therapeutic Exercise Minutes  15  -TB      31333 - PT Manual Therapy Minutes  35  -TB         Exercise 1    Exercise Name 1  supine deep breathing with light vocalization; cued on keeping her jaw relaxed and opening her pharynx more; cued to keep her resonance, open her pharynx and keep her support and try to  give a louder vocalizaiton; she tended to revert back to tightening her throat so had her back off. Also cued her to keep from keeping her mouth wide with speech to try to keep her throat relaxed  -TB         Exercise 2    Exercise Name 2  standing vocalization adding more volume with abdominal support; she would tend to tighten her jaw and keep her mouth closed; early, she also raised her shoulders; she was eventually able to hold a straight tone  -TB        User Key  (r) = Recorded By, (t) = Taken By, (c) = Cosigned By    Initials Name Provider Type    TB Wily Hernandez, PT Physical Therapist                      Manual Rx (last 36 hours)      Manual Treatments     Row Name 01/30/20 2200 01/30/20 2100          Total Minutes    75722 - PT Manual Therapy Minutes  35  -TB  --        Manual Rx 1    Manual Rx 1 Location  --  prone CT   -TB     Manual Rx 1 Type  --  mob  -TB     Manual Rx 1 Grade  --  MFR upper thoracic pulling tissue caudally  -TB     Manual Rx 1 Duration  --  10  -TB        Manual Rx 2    Manual Rx 2 Location  --  prone gunjan UT/LS  -TB     Manual Rx 2 Type  --  STM  -TB     Manual Rx 2 Grade  --  subocc  -TB     Manual Rx 2 Duration  --  20  -TB        Manual Rx 3    Manual Rx 3 Location  --  supine gunjan SCM, anterior cx mms and digastric and gunjan masseters  -TB     Manual Rx 3 Type  --  STM  -TB        Manual Rx 4    Manual Rx 4 Location  --  supine subocc release  -TB       User Key  (r) = Recorded By, (t) = Taken By, (c) = Cosigned By    Initials Name Provider Type    TB Wily Hernandez, PT Physical Therapist          PT OP Goals     Row Name 01/30/20 2200          Long Term Goals    LTG Date to Achieve  02/07/20  -TB     LTG 1  Able to coordinate abdominal support without adding neck tension  -TB     LTG 1 Progress  Progressing  -TB     LTG 1 Progress Comments  improving with this  -TB     LTG 2  Demonstrate improved posture with better cervical alignment  -TB     LTG 2 Progress  Met  -TB     LTG  3  Demonstrate improve coordination of diaphragmatic breathing and rib excursion  -TB     LTG 3 Progress  Progressing  -TB     LTG 3 Progress Comments  will occasionally breathe high but otherwise does well with rib excersion  -TB     LTG 4  Able to coordinate breathing and support with improved placement of sound in resonators without tensing neck  -TB     LTG 4 Progress  Progressing  -TB     LTG 5  Demonstrate relaxation techniques for jaw mms and tongue  -TB     LTG 5 Progress  Progressing  -TB     LTG 6  Able to speak for 15 minutes or more without fatigue  -TB     LTG 6 Progress  Ongoing  -TB       User Key  (r) = Recorded By, (t) = Taken By, (c) = Cosigned By    Initials Name Provider Type    TB Wily Hernandez, PT Physical Therapist          Therapy Education  Education Details: cont with same vocalizaitons with support in standing keeping her resonance; try voice recording herself              Time Calculation:                    Wily Hernandez, PT  1/30/2020

## 2020-02-05 LAB
ALBUMIN SERPL-MCNC: 4.3 G/DL (ref 3.5–5.2)
ALP BLD-CCNC: 91 U/L (ref 35–104)
ALT SERPL-CCNC: 9 U/L (ref 5–33)
ANION GAP SERPL CALCULATED.3IONS-SCNC: 12 MMOL/L (ref 7–19)
AST SERPL-CCNC: 17 U/L (ref 5–32)
BASOPHILS ABSOLUTE: 0.1 K/UL (ref 0–0.2)
BASOPHILS RELATIVE PERCENT: 1.2 % (ref 0–1)
BILIRUB SERPL-MCNC: 0.3 MG/DL (ref 0.2–1.2)
BUN BLDV-MCNC: 10 MG/DL (ref 6–20)
CALCIUM SERPL-MCNC: 9.2 MG/DL (ref 8.6–10)
CHLORIDE BLD-SCNC: 101 MMOL/L (ref 98–111)
CHOLESTEROL, TOTAL: 207 MG/DL (ref 160–199)
CO2: 26 MMOL/L (ref 22–29)
CREAT SERPL-MCNC: 0.6 MG/DL (ref 0.5–0.9)
EOSINOPHILS ABSOLUTE: 0.2 K/UL (ref 0–0.6)
EOSINOPHILS RELATIVE PERCENT: 3.1 % (ref 0–5)
GFR NON-AFRICAN AMERICAN: >60
GLUCOSE BLD-MCNC: 84 MG/DL (ref 74–109)
HCT VFR BLD CALC: 41.3 % (ref 37–47)
HDLC SERPL-MCNC: 62 MG/DL (ref 65–121)
HEMOGLOBIN: 13 G/DL (ref 12–16)
IMMATURE GRANULOCYTES #: 0 K/UL
LDL CHOLESTEROL CALCULATED: 122 MG/DL
LYMPHOCYTES ABSOLUTE: 2.6 K/UL (ref 1.1–4.5)
LYMPHOCYTES RELATIVE PERCENT: 35.5 % (ref 20–40)
MCH RBC QN AUTO: 30.2 PG (ref 27–31)
MCHC RBC AUTO-ENTMCNC: 31.5 G/DL (ref 33–37)
MCV RBC AUTO: 95.8 FL (ref 81–99)
MONOCYTES ABSOLUTE: 0.5 K/UL (ref 0–0.9)
MONOCYTES RELATIVE PERCENT: 6.9 % (ref 0–10)
NEUTROPHILS ABSOLUTE: 3.9 K/UL (ref 1.5–7.5)
NEUTROPHILS RELATIVE PERCENT: 53 % (ref 50–65)
PDW BLD-RTO: 12.4 % (ref 11.5–14.5)
PLATELET # BLD: 386 K/UL (ref 130–400)
PMV BLD AUTO: 9.8 FL (ref 9.4–12.3)
POTASSIUM SERPL-SCNC: 4 MMOL/L (ref 3.5–5)
RBC # BLD: 4.31 M/UL (ref 4.2–5.4)
SEDIMENTATION RATE, ERYTHROCYTE: 14 MM/HR (ref 0–20)
SODIUM BLD-SCNC: 139 MMOL/L (ref 136–145)
TOTAL PROTEIN: 7.2 G/DL (ref 6.6–8.7)
TRIGL SERPL-MCNC: 117 MG/DL (ref 0–149)
TSH REFLEX FT4: 1.42 UIU/ML (ref 0.35–5.5)
VITAMIN B-12: 1189 PG/ML (ref 211–946)
VITAMIN D 25-HYDROXY: 28.4 NG/ML
WBC # BLD: 7.4 K/UL (ref 4.8–10.8)

## 2020-02-06 ENCOUNTER — TREATMENT (OUTPATIENT)
Dept: PHYSICAL THERAPY | Facility: CLINIC | Age: 50
End: 2020-02-06

## 2020-02-06 DIAGNOSIS — M54.2 CERVICALGIA: Primary | ICD-10-CM

## 2020-02-06 DIAGNOSIS — R49.0 MUSCLE TENSION DYSPHONIA: ICD-10-CM

## 2020-02-06 PROCEDURE — 97140 MANUAL THERAPY 1/> REGIONS: CPT | Performed by: PHYSICAL THERAPIST

## 2020-02-06 PROCEDURE — 97110 THERAPEUTIC EXERCISES: CPT | Performed by: PHYSICAL THERAPIST

## 2020-02-06 NOTE — PROGRESS NOTES
"Outpatient Physical Therapy Ortho Treatment Note       Patient Name: Mariann Portillo  : 1970  MRN: [id number]  Today's Date: 2020      Visit Date: 2020    Visit Dx:    ICD-10-CM ICD-9-CM   1. Cervicalgia M54.2 723.1   2. 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       Past Medical History:   Diagnosis Date   • Allergic rhinitis    • Asthma    • Chronic UTI    • Endometriosis    • Fibromyalgia    • High cholesterol    • IBS (irritable bowel syndrome)    • Meniere disease    • Migraines         Past Surgical History:   Procedure Laterality Date   • CHOLECYSTECTOMY     • CYST REMOVAL     • ENDOMETRIAL BIOPSY                         PT Assessment/Plan     Row Name 20 1600          PT Assessment    Assessment Comments  She is doing better every week. She still struggles sometimes placing the sound in her resonators for volume but she is showing good abdominal support. She was able to speak louder today wtihout cracking.  -TB        PT Plan    PT Plan Comments  Cont to add volume with resonance and an open throat/pharynx with good abdominal support  -TB       User Key  (r) = Recorded By, (t) = Taken By, (c) = Cosigned By    Initials Name Provider Type    TB Wily Hernandez, PT Physical Therapist            OP Exercises     Row Name 20 1600 20 1500          Subjective Comments    Subjective Comments  She has been practicing her vocalizations. Her voice may still fatigue some. She is still fighting some cold/throat issues.   -TB  --        Subjective Pain    Pre-Treatment Pain Level  0  -TB  --        Total Minutes    13246 - PT Therapeutic Exercise Minutes  15  -TB  --     26771 - PT Manual Therapy Minutes  --  30  -TB        Exercise 1    Exercise Name 1  continued working on vocalizations adding more volume today; worked on giving manual support to abdominals with a hard \"huh\" sound. She did better at not cracking with this. Also " "worked on counting to 5 progressively louder. She struggled with this so didn't pursue it. Had her do \"whiny sirens\" to place the sound in her nasal resonators and she was able to do this without tightening her throat. Told her to play with the placement of her tongue to create different soudns.   -TB  --       User Key  (r) = Recorded By, (t) = Taken By, (c) = Cosigned By    Initials Name Provider Type    TB Wily Hernandez, PT Physical Therapist                      Manual Rx (last 36 hours)      Manual Treatments     Row Name 02/06/20 1500             Total Minutes    82229 - PT Manual Therapy Minutes  30  -TB         Manual Rx 1    Manual Rx 1 Location  prone CT   -TB      Manual Rx 1 Type  mob  -TB      Manual Rx 1 Grade  MFR upper thoracic pulling tissue caudally  -TB         Manual Rx 2    Manual Rx 2 Location  prone gunjan UT/LS  -TB      Manual Rx 2 Type  STM  -TB      Manual Rx 2 Grade  subocc  -TB         Manual Rx 3    Manual Rx 3 Location  supine gunjan SCM, anterior cx mms and digastric and gunjan masseters  -TB      Manual Rx 3 Type  STM  -TB         Manual Rx 4    Manual Rx 4 Location  supine subocc release  -TB        User Key  (r) = Recorded By, (t) = Taken By, (c) = Cosigned By    Initials Name Provider Type    TB Wily Hernandez, PT Physical Therapist          PT OP Goals     Row Name 02/06/20 1600          Long Term Goals    LTG Date to Achieve  02/07/20  -TB     LTG 1  Able to coordinate abdominal support without adding neck tension  -TB     LTG 1 Progress  Met  -TB     LTG 1 Progress Comments  consistently doing this  -TB     LTG 2  Demonstrate improved posture with better cervical alignment  -TB     LTG 2 Progress  Met  -TB     LTG 3  Demonstrate improve coordination of diaphragmatic breathing and rib excursion  -TB     LTG 3 Progress  Progressing  -TB     LTG 4  Able to coordinate breathing and support with improved placement of sound in resonators without tensing neck  -TB     LTG 4 Progress  " "Progressing  -TB     LTG 5  Demonstrate relaxation techniques for jaw mms and tongue  -TB     LTG 5 Progress  Progressing  -TB     LTG 6  Able to speak for 15 minutes or more without fatigue  -TB     LTG 6 Progress  Ongoing  -TB        Time Calculation    PT Goal Re-Cert Due Date  02/23/20  -TB       User Key  (r) = Recorded By, (t) = Taken By, (c) = Cosigned By    Initials Name Provider Type    TB Wily Hernandez, PT Physical Therapist          Therapy Education  Education Details: whiny sir, loud \"huh\" with support              Time Calculation:                    Wily Hernandez, PT  2/6/2020  "

## 2020-02-13 ENCOUNTER — TREATMENT (OUTPATIENT)
Dept: PHYSICAL THERAPY | Facility: CLINIC | Age: 50
End: 2020-02-13

## 2020-02-13 DIAGNOSIS — R49.0 MUSCLE TENSION DYSPHONIA: ICD-10-CM

## 2020-02-13 DIAGNOSIS — M54.2 CERVICALGIA: Primary | ICD-10-CM

## 2020-02-13 PROCEDURE — 97140 MANUAL THERAPY 1/> REGIONS: CPT | Performed by: PHYSICAL THERAPIST

## 2020-02-13 PROCEDURE — 97110 THERAPEUTIC EXERCISES: CPT | Performed by: PHYSICAL THERAPIST

## 2020-02-14 NOTE — PROGRESS NOTES
"Outpatient Physical Therapy Ortho Treatment Note       Patient Name: Mariann Portillo  : 1970  MRN: [id number]  Today's Date: 2020      Visit Date: 2020    Visit Dx:    ICD-10-CM ICD-9-CM   1. Cervicalgia M54.2 723.1   2. 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       Past Medical History:   Diagnosis Date   • Allergic rhinitis    • Asthma    • Chronic UTI    • Endometriosis    • Fibromyalgia    • High cholesterol    • IBS (irritable bowel syndrome)    • Meniere disease    • Migraines         Past Surgical History:   Procedure Laterality Date   • CHOLECYSTECTOMY     • CYST REMOVAL     • ENDOMETRIAL BIOPSY                         PT Assessment/Plan     Row Name 20 2676          PT Assessment    Assessment Comments  Today was the best that she has sounded regarding volume and clarity. She has been practicing her resonance exs with good support and breathing. She still needs some repetitions to find her muscle memory and be able to add volume. She is approaching DC.  -TB        PT Plan    PT Plan Comments  Cont once a week to work on adding volume and shaping her mouth and pharynx for the least tension.   -TB       User Key  (r) = Recorded By, (t) = Taken By, (c) = Cosigned By    Initials Name Provider Type    TB Wily Hernandez, PT Physical Therapist            OP Exercises     Row Name 20 8047             Subjective Comments    Subjective Comments  She feels like her voice continues to get stronger. She can still feel some tension at times. She hasn't been sick this week.  -TB         Subjective Pain    Pre-Treatment Pain Level  0  -TB         Total Minutes    38895 - PT Therapeutic Exercise Minutes  20  -TB      12375 - PT Manual Therapy Minutes  30  -TB         Exercise 1    Exercise Name 1  vocal exercises with breathing and abdominal support; progressed to singing ABC's with tall/narrow vowels; did exs including \"lip trills\" and " "\"Edgard\" up and down the scale to work on resonance placement. She did these well wtih good volume wtihout her voice cracking. We tried a powerful \"huh\" with support but her throat tightened up so I told her to not do that yet.  -TB      Time 1  20  -TB        User Key  (r) = Recorded By, (t) = Taken By, (c) = Cosigned By    Initials Name Provider Type    TB Wily Hernandez, PT Physical Therapist                      Manual Rx (last 36 hours)      Manual Treatments     Row Name 02/13/20 1555             Total Minutes    04733 - PT Manual Therapy Minutes  30  -TB         Manual Rx 1    Manual Rx 1 Location  prone CT   -TB      Manual Rx 1 Type  mob  -TB      Manual Rx 1 Grade  MFR upper thoracic pulling tissue caudally  -TB         Manual Rx 2    Manual Rx 2 Location  prone gunjan UT/LS  -TB      Manual Rx 2 Type  STM  -TB      Manual Rx 2 Grade  subocc  -TB         Manual Rx 3    Manual Rx 3 Location  supine gunjan SCM, anterior cx mms and digastric and gunjan masseters  -TB      Manual Rx 3 Type  STM  -TB         Manual Rx 4    Manual Rx 4 Location  supine subocc release  -TB        User Key  (r) = Recorded By, (t) = Taken By, (c) = Cosigned By    Initials Name Provider Type    TB Wily Hernandez, PT Physical Therapist          PT OP Goals     Row Name 02/13/20 1555          Long Term Goals    LTG Date to Achieve  02/07/20  -TB     LTG 1  Able to coordinate abdominal support without adding neck tension  -TB     LTG 1 Progress  Met  -TB     LTG 2  Demonstrate improved posture with better cervical alignment  -TB     LTG 2 Progress  Met  -TB     LTG 3  Demonstrate improve coordination of diaphragmatic breathing and rib excursion  -TB     LTG 3 Progress  Met  -TB     LTG 3 Progress Comments  she is doing well with her breathing  -TB     LTG 4  Able to coordinate breathing and support with improved placement of sound in resonators without tensing neck  -TB     LTG 4 Progress  Progressing  -TB     LTG 4 Progress Comments  " "progressing; mostly has no tension in her neck except with added volume  -TB     LTG 5  Demonstrate relaxation techniques for jaw mms and tongue  -TB     LTG 5 Progress  Progressing  -TB     LTG 5 Progress Comments  cued to relax jaw more and open mouth more  -TB     LTG 6  Able to speak for 15 minutes or more without fatigue  -TB     LTG 6 Progress  Ongoing  -TB        Time Calculation    PT Goal Re-Cert Due Date  02/23/20  -TB       User Key  (r) = Recorded By, (t) = Taken By, (c) = Cosigned By    Initials Name Provider Type    TB Wily Hernandez, PT Physical Therapist          Therapy Education  Education Details: progress to lip trills adn \"Edgard\" up and down the scale, sing ABC with resonance              Time Calculation:                    Wily Hernandez, PT  2/13/2020  "

## 2020-02-15 PROBLEM — R06.02 SHORTNESS OF BREATH: Status: ACTIVE | Noted: 2020-02-15

## 2020-02-17 ENCOUNTER — OFFICE VISIT (OUTPATIENT)
Dept: PULMONOLOGY | Facility: CLINIC | Age: 50
End: 2020-02-17

## 2020-02-17 VITALS
HEIGHT: 65 IN | OXYGEN SATURATION: 98 % | BODY MASS INDEX: 25.66 KG/M2 | WEIGHT: 154 LBS | SYSTOLIC BLOOD PRESSURE: 104 MMHG | HEART RATE: 99 BPM | DIASTOLIC BLOOD PRESSURE: 68 MMHG

## 2020-02-17 DIAGNOSIS — R06.02 SHORTNESS OF BREATH: Primary | ICD-10-CM

## 2020-02-17 DIAGNOSIS — R91.8 MULTIPLE LUNG NODULES: ICD-10-CM

## 2020-02-17 PROCEDURE — 94729 DIFFUSING CAPACITY: CPT | Performed by: INTERNAL MEDICINE

## 2020-02-17 PROCEDURE — 94727 GAS DIL/WSHOT DETER LNG VOL: CPT | Performed by: INTERNAL MEDICINE

## 2020-02-17 PROCEDURE — 99203 OFFICE O/P NEW LOW 30 MIN: CPT | Performed by: INTERNAL MEDICINE

## 2020-02-17 PROCEDURE — 94010 BREATHING CAPACITY TEST: CPT | Performed by: INTERNAL MEDICINE

## 2020-02-17 RX ORDER — POLYETHYLENE GLYCOL 3350 17 G/17G
17 POWDER, FOR SOLUTION ORAL AS NEEDED
COMMUNITY
End: 2022-10-13

## 2020-02-17 RX ORDER — DICYCLOMINE HYDROCHLORIDE 10 MG/1
10 CAPSULE ORAL
COMMUNITY
End: 2020-11-19

## 2020-02-17 RX ORDER — PHENAZOPYRIDINE HYDROCHLORIDE 100 MG/1
100 TABLET, FILM COATED ORAL 3 TIMES DAILY PRN
COMMUNITY

## 2020-02-17 RX ORDER — PHENOBARBITAL, HYOSCYAMINE SULFATE, ATROPINE SULFATE AND SCOPOLAMINE HYDROBROMIDE .0194; .1037; 16.2; .0065 MG/1; MG/1; MG/1; MG/1
1 TABLET ORAL EVERY 6 HOURS PRN
COMMUNITY
End: 2022-10-13 | Stop reason: SDUPTHER

## 2020-02-17 RX ORDER — PHENOL 1.4 %
AEROSOL, SPRAY (ML) MUCOUS MEMBRANE AS NEEDED
COMMUNITY

## 2020-02-17 RX ORDER — NITROFURANTOIN MACROCRYSTALS 100 MG/1
100 CAPSULE ORAL AS NEEDED
COMMUNITY

## 2020-02-17 NOTE — PROCEDURES
Pulmonary Function Test  Performed by: Khanh Mendoza MD  Authorized by: Khanh Mendoza MD      Pre Drug    FVC: 93%   FEV1: 90%   FEF 25-75%: 85%   FEV1/FVC: 79.44%   T%   RV: 64%   DLCO: 70%   D/VAsb: 84%

## 2020-02-17 NOTE — PROGRESS NOTES
Subjective   Mariann Portillo is a 49 y.o. female.     Background: Pt with dyspnea and multiple lung nodules identified 9/2018    Chief Complaint   Patient presents with   • Shortness of Breath        History of Present Illness   She c/o repeated viral illness lasting a few weeks over the past year.  She was dx with asthma in her late 20's, treated with albuterol prn.  She had imaging workup with ct showing abnormal nodule. The nodule is continuous in the chest for undetermined time with no modifying factors, no associated symptoms, and uncertain severity.  She denies any respiratory complaints for now.  She works with [employer] in the summer.  Dr. Reese has asked me to see her.    Medical/Family/Social History   has a past medical history of Allergic rhinitis, Asthma, Chronic UTI, Endometriosis, Fibromyalgia, High cholesterol, IBS (irritable bowel syndrome), Meniere disease, and Migraines.   has a past surgical history that includes Cyst Removal; Endometrial biopsy; and Cholecystectomy.  family history includes Cancer in her mother; Heart disease in her father.   reports that she has never smoked. She has never used smokeless tobacco. Drug use questions deferred to the physician. She reports that she does not drink alcohol.  Allergies   Allergen Reactions   • Decongestant [Pseudoephedrine] Shortness Of Breath and Swelling     Per patient she is allergic to Actifed and anything like that.    • Codimal-A [Brompheniramine] Itching   • Levaquin [Levofloxacin] Itching     Per patient   • Percocet [Oxycodone-Acetaminophen] Nausea And Vomiting     Medications    Current Outpatient Medications:   •  albuterol sulfate HFA (PROAIR HFA) 108 (90 Base) MCG/ACT inhaler, Every 4 (Four) Hours., Disp: , Rfl:   •  dicyclomine (BENTYL) 10 MG capsule, Take 10 mg by mouth., Disp: , Rfl:   •  imipramine (TOFRANIL) 25 MG tablet, , Disp: , Rfl:   •  Melatonin 10 MG tablet, Take  by mouth., Disp: , Rfl:   •  metFORMIN (GLUCOPHAGE) 850 MG  tablet, , Disp: , Rfl:   •  mometasone (ELOCON) 0.1 % cream, , Disp: , Rfl:   •  nitrofurantoin (MACRODANTIN) 100 MG capsule, Take 100 mg by mouth., Disp: , Rfl:   •  ondansetron (ZOFRAN) 4 MG tablet, Take 1 tablet by mouth Every 8 (Eight) Hours As Needed for Nausea or Vomiting., Disp: 18 tablet, Rfl: 1  •  PB-Hyoscy-Atropine-Scopolamine (DONNATAL) 16.2 MG per tablet, Take 1 tablet by mouth Every 6 (Six) Hours As Needed., Disp: , Rfl:   •  phenazopyridine (PYRIDIUM) 100 MG tablet, Take 100 mg by mouth., Disp: , Rfl:   •  pimecrolimus (ELIDEL) 1 % cream, TRINITY ON THE SKIN BID, Disp: , Rfl: 1  •  polyethylene glycol (MIRALAX) powder, Take 17 g by mouth As Needed., Disp: , Rfl:   •  therapeutic multivitamin-minerals (THERAGRAN-M) tablet, Take 1 tablet by mouth., Disp: , Rfl:   •  Triamcinolone Acetonide (NASACORT) 55 MCG/ACT nasal inhaler, 2 sprays into the nostril(s) as directed by provider 2 (Two) Times a Day., Disp: 2 bottle, Rfl: 3  •  vitamin B-12 (CYANOCOBALAMIN) 1000 MCG tablet, Take 1,000 mcg by mouth., Disp: , Rfl:   •  vitamin D (ERGOCALCIFEROL) 06506 units capsule capsule, 1 (One) Time Per Week., Disp: , Rfl:   •  zolpidem CR (AMBIEN CR) 6.25 MG CR tablet, , Disp: , Rfl:     Review of Systems   Constitutional: Negative for chills and fever.   HENT: Negative for postnasal drip, rhinorrhea, sinus pressure, trouble swallowing and voice change.    Eyes: Negative for photophobia and visual disturbance.   Respiratory: Positive for cough. Negative for shortness of breath, wheezing and stridor.    Cardiovascular: Negative for chest pain and leg swelling.   Gastrointestinal: Negative for abdominal pain, nausea, vomiting and GERD.   Genitourinary: Negative for difficulty urinating and hematuria.   Musculoskeletal: Negative for gait problem and joint swelling.   Skin: Negative for pallor and skin lesions.   Neurological: Negative for tremors, weakness and confusion.   Hematological: Negative for adenopathy. Does not  "bruise/bleed easily.   Psychiatric/Behavioral: The patient is not nervous/anxious.        Objective   /68   Pulse 99   Ht 165.1 cm (65\")   Wt 69.9 kg (154 lb)   SpO2 98% Comment: RA  Breastfeeding No   BMI 25.63 kg/m²   Physical Exam   Constitutional: She appears well-developed. She does not appear ill. No distress.   HENT:   Head: Atraumatic.   Nose: Nose normal.   Eyes: Conjunctivae and EOM are normal. No scleral icterus.   Neck: Neck supple.   Cardiovascular: Normal rate, regular rhythm, S1 normal and S2 normal. Exam reveals no friction rub.   No murmur heard.  Pulmonary/Chest: Effort normal and breath sounds normal. No stridor. No respiratory distress. She has no wheezes. She has no rales.   Abdominal: Soft. She exhibits no distension. There is no tenderness.   Musculoskeletal: She exhibits no edema or deformity.   Neurological: She is alert.   Skin: Skin is warm. No rash noted.   Psychiatric: She has a normal mood and affect.        -----------------------------------------------------------------------------------------------  EXAMINATION: CT CHEST W CONTRAST 11/21/2019 4:24 PM  HISTORY: Lung nodule  Comparison: Chest x-ray 1/10/2019, CT chest with contrast 9/14/2018  Technique: Sequential imaging was performed from the thoracic inlet  through the upper abdomen after the administration of IV contrast.  Sagittal and coronal reformations were made from the original source  data and reviewed. Automated exposure control was utilized for  radiation dose reduction.  Radiation dose:  mGy-cm  Findings:  The visualized thyroid gland is grossly normal in appearance. The  trachea and main bronchi appear widely patent and in normal anatomic  position. The esophagus is grossly normal in appearance.  No pathologically enlarged axillary, mediastinal, or hilar lymph nodes  are appreciated.  The thoracic aorta appears normal in caliber. The main pulmonary  artery appears normal in caliber.  Multiple adjacent " areas nodularity are again noted in the right upper  lobe, spanning approximately 1.5 cm, unchanged compared to the prior  exam. Areas of nodularity extending to the pleural surface with some  underlying scarring suspected. A groundglass nodule is noted in the  inferior aspect of the right upper lobe adjacent to the minor fissure  which measures 10 mm in size. An additional tiny right middle lobe  nodule is seen which measures 5 mm in size (series 3 image 74). A  small nodule medially measures 4-5 mm (series 3 image 68), stable.  Tiny areas of nodularity are noted in the peripheral right middle lobe  which have an inflammatory appearance (series 3 image 87). There is an  area scarring in the right lower lobe which is stable compared to  prior exams.  Review of the visualized portion of the upper abdomen demonstrates no  acute abnormalities.  Review of the visualized osseous structures demonstrates no acute or  aggressive lesions.  IMPRESSION:  Impression:  1. Similar appearance of the chest, with an area of nodularity in the  right middle lobe unchanged from the prior exam, favored to represent  an area of postinflammatory scarring.  2. New area of innumerable tiny nodules in the peripheral right middle  lobe which are favored to represent an inflammatory process.  3. Additional noncalcified pulmonary nodules as above for which  attention on follow-up is recommended in 12 months.  Signed by Dr Pedro Muñoz on 11/21/2019 4:32 PM     -----------------------------------------------------------------------------------------------  PFT Values        Some values may be hidden. Unless noted otherwise, only the newest values recorded on each date are displayed.         Old Values PFT Results 2/17/20   No data to display.      Pre Drug PFT Results 2/17/20   FVC 93   FEV1 90   FEF 25-75% 85   FEV1/FVC 79.44      Post Drug PFT Results 2/17/20   No data to display.      Other Tests PFT Results 2/17/20   TLC 79   RV 64   DLCO  70   D/VAsb 84           My interpretation of the PFT: normal spirometry, dlco, lung volumes.     -----------------------------------------------------------------------------------------------  Assessment/Plan   Problem List Items Addressed This Visit        Pulmonary Problems    Shortness of breath - Primary    Relevant Orders    Pulmonary Function Test (Completed)      Other Visit Diagnoses     Multiple lung nodules        identified 9/2018    Relevant Orders    CT Chest Without Contrast        Patient's Body mass index is 25.63 kg/m². BMI is within normal parameters. No follow-up required..      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       Electronically signed by Khanh Mendoza MD, 2/17/2020, 10:55 AM

## 2020-02-21 ENCOUNTER — TREATMENT (OUTPATIENT)
Dept: PHYSICAL THERAPY | Facility: CLINIC | Age: 50
End: 2020-02-21

## 2020-02-21 DIAGNOSIS — R49.0 MUSCLE TENSION DYSPHONIA: ICD-10-CM

## 2020-02-21 DIAGNOSIS — M54.2 CERVICALGIA: Primary | ICD-10-CM

## 2020-02-21 PROCEDURE — 97140 MANUAL THERAPY 1/> REGIONS: CPT | Performed by: PHYSICAL THERAPIST

## 2020-02-21 PROCEDURE — 97110 THERAPEUTIC EXERCISES: CPT | Performed by: PHYSICAL THERAPIST

## 2020-02-22 NOTE — PROGRESS NOTES
Outpatient Physical Therapy Ortho Progress Note       Patient Name: Mariann Portillo  : 1970  MRN: [id number]  Today's Date: 2020      Visit Date: 2020    Visit Dx:    ICD-10-CM ICD-9-CM   1. Cervicalgia M54.2 723.1   2. Muscle tension dysphonia R49.0 784.42       Patient Active Problem List   Diagnosis   • Shortness of breath        Past Medical History:   Diagnosis Date   • Allergic rhinitis    • Asthma    • Chronic UTI    • Endometriosis    • Fibromyalgia    • High cholesterol    • IBS (irritable bowel syndrome)    • Meniere disease    • Migraines         Past Surgical History:   Procedure Laterality Date   • CHOLECYSTECTOMY     • CYST REMOVAL     • ENDOMETRIAL BIOPSY                         PT Assessment/Plan     Row Name 20 1545          PT Assessment    Functional Limitations  Limitation in home management;Performance in leisure activities  -TB     Impairments  Impaired postural alignment  -TB     Assessment Comments  She is very close to discharge. She is better at finding her resonators to prevent tightening her throat. She struggles some with coordinating placing the sound in the resonators and still getting her tongue out of the way and opening her jaw. She may just need time to build her conditioning wtih her voice.   -TB     Rehab Potential  Good  -TB     Patient/caregiver participated in establishment of treatment plan and goals  Yes  -TB     Patient would benefit from skilled therapy intervention  Yes  -TB        PT Plan    PT Frequency  1x/week  -TB     Predicted Duration of Therapy Intervention (Therapy Eval)  1-2 weeks  -TB     Planned CPT's?  PT THER PROC EA 15 MIN: 58619;PT THER ACT EA 15 MIN: 10287;PT MANUAL THERAPY EA 15 MIN: 17525  -TB     PT Plan Comments  cont to work on relaxation of her throat and coordination of placing the sound in her resonators with proper breathing and support and opening her mouth more. Progress to discharge.   -TB       User Key  (r) = Recorded  By, (t) = Taken By, (c) = Cosigned By    Initials Name Provider Type    TB Wily Hernandez, PT Physical Therapist            OP Exercises     Row Name 02/21/20 1545             Subjective Comments    Subjective Comments  She says she continues to get stronger. She saw a pulmonologist who said all her phlegm is in her bronci and upper resp. She still fatigues some with her vocal exs.   -TB         Subjective Pain    Pre-Treatment Pain Level  0  -TB         Total Minutes    10245 - PT Therapeutic Exercise Minutes  15  -TB      19745 - PT Manual Therapy Minutes  30  -TB         Exercise 1    Exercise Name 1  vocal exercises with breathing and abdominal support; worked on position of the tongue today to open her pharynx resonator along with her nasal resonator. She did well with lip trills and could use this to place the sound. She still tends to keep her jaw tight.  -TB      Time 1  20  -TB        User Key  (r) = Recorded By, (t) = Taken By, (c) = Cosigned By    Initials Name Provider Type    TB Wily Hernandez, PT Physical Therapist                      Manual Rx (last 36 hours)      Manual Treatments     Row Name 02/21/20 1545             Total Minutes    24982 - PT Manual Therapy Minutes  30  -TB         Manual Rx 1    Manual Rx 1 Location  prone CT   -TB      Manual Rx 1 Type  mob  -TB      Manual Rx 1 Grade  MFR upper thoracic pulling tissue caudally  -TB         Manual Rx 2    Manual Rx 2 Location  prone gunjan UT/LS  -TB      Manual Rx 2 Type  STM  -TB      Manual Rx 2 Grade  subocc  -TB         Manual Rx 3    Manual Rx 3 Location  supine gunjan SCM, anterior cx mms and digastric and gunjan masseters  -TB      Manual Rx 3 Type  STM  -TB         Manual Rx 4    Manual Rx 4 Location  supine subocc release  -TB        User Key  (r) = Recorded By, (t) = Taken By, (c) = Cosigned By    Initials Name Provider Type    Wily Roberts, PT Physical Therapist          PT OP Goals     Row Name 02/21/20 1545          Long Term  Goals    LTG Date to Achieve  02/07/20  -TB     LTG 1  Able to coordinate abdominal support without adding neck tension  -TB     LTG 1 Progress  Met  -TB     LTG 2  Demonstrate improved posture with better cervical alignment  -TB     LTG 2 Progress  Met  -TB     LTG 3  Demonstrate improve coordination of diaphragmatic breathing and rib excursion  -TB     LTG 3 Progress  Met  -TB     LTG 4  Able to coordinate breathing and support with improved placement of sound in resonators without tensing neck  -TB     LTG 4 Progress  Progressing  -TB     LTG 4 Progress Comments  has very little tension now in her throat with vocalization  -TB     LTG 5  Demonstrate relaxation techniques for jaw mms and tongue  -TB     LTG 5 Progress  Progressing  -TB     LTG 5 Progress Comments  can with cues but tends to keep her jaw tight  -TB     LTG 6  Able to speak for 15 minutes or more without fatigue  -TB     LTG 6 Progress  Ongoing  -TB     LTG 6 Progress Comments  has some fatigue with vocal exs but close  -TB        Time Calculation    PT Goal Re-Cert Due Date  03/22/20  -TB       User Key  (r) = Recorded By, (t) = Taken By, (c) = Cosigned By    Initials Name Provider Type    TB Wily Hernandez, PT Physical Therapist          Therapy Education  Education Details: cont with lip trills and work on placement of her jaw and tongue to create a better sound              Time Calculation:                    Wily Hernandez, PT  2/21/2020

## 2020-02-28 ENCOUNTER — TREATMENT (OUTPATIENT)
Dept: PHYSICAL THERAPY | Facility: CLINIC | Age: 50
End: 2020-02-28

## 2020-02-28 DIAGNOSIS — R49.0 MUSCLE TENSION DYSPHONIA: ICD-10-CM

## 2020-02-28 DIAGNOSIS — M54.2 CERVICALGIA: Primary | ICD-10-CM

## 2020-02-28 PROCEDURE — 97140 MANUAL THERAPY 1/> REGIONS: CPT | Performed by: PHYSICAL THERAPIST

## 2020-02-28 PROCEDURE — 97110 THERAPEUTIC EXERCISES: CPT | Performed by: PHYSICAL THERAPIST

## 2020-02-29 NOTE — PROGRESS NOTES
"Outpatient Physical Therapy Ortho Treatment Note       Patient Name: Mariann Portillo  : 1970  MRN: [id number]  Today's Date: 2020      Visit Date: 2020    Visit Dx:    ICD-10-CM ICD-9-CM   1. Cervicalgia M54.2 723.1   2. Muscle tension dysphonia R49.0 784.42       Patient Active Problem List   Diagnosis   • Shortness of breath        Past Medical History:   Diagnosis Date   • Allergic rhinitis    • Asthma    • Chronic UTI    • Endometriosis    • Fibromyalgia    • High cholesterol    • IBS (irritable bowel syndrome)    • Meniere disease    • Migraines         Past Surgical History:   Procedure Laterality Date   • CHOLECYSTECTOMY     • CYST REMOVAL     • ENDOMETRIAL BIOPSY                         PT Assessment/Plan     Row Name 20 1800          PT Assessment    Assessment Comments  Overall, she is doing well and her voice resonance continues to improve as she learns to relax her jaw and support better. I believe she is close to discharge.   -TB        PT Plan    PT Plan Comments  Let her had a couple of weeks to work on this and consider discharge is she is still doing well.   -TB       User Key  (r) = Recorded By, (t) = Taken By, (c) = Cosigned By    Initials Name Provider Type    TB Wily Hernandez, PT Physical Therapist            OP Exercises     Row Name 20 1391             Subjective Comments    Subjective Comments  She says that she struggled the last couple of days when she choked on a pill. This made her throat tighten up. Prior to that, it was doing well.   -TB         Subjective Pain    Pre-Treatment Pain Level  0  -TB         Total Minutes    52535 - PT Therapeutic Exercise Minutes  15  -TB      01992 - PT Manual Therapy Minutes  30  -TB         Exercise 1    Exercise Name 1  vocal exercises in standing; had her do alphabet and \"Edgard\" exercise; cued to relax jaw more and keep resonance; tried a hard \"huh\" and she tended to tighten her throat. Focused on her TA contraction and " she tended to suck in her stomach instead of tighten it. Had her start a plank position off knees to work on tightening core  -TB        User Key  (r) = Recorded By, (t) = Taken By, (c) = Cosigned By    Initials Name Provider Type    Wily Roberts, PT Physical Therapist                      Manual Rx (last 36 hours)      Manual Treatments     Row Name 02/28/20 1555             Total Minutes    52068 - PT Manual Therapy Minutes  30  -TB         Manual Rx 1    Manual Rx 1 Location  prone CT   -TB      Manual Rx 1 Type  mob  -TB      Manual Rx 1 Grade  MFR upper thoracic pulling tissue caudally  -TB         Manual Rx 2    Manual Rx 2 Location  prone gunjan UT/LS  -TB      Manual Rx 2 Type  STM  -TB      Manual Rx 2 Grade  subocc  -TB         Manual Rx 3    Manual Rx 3 Location  supine gunjan SCM, anterior cx mms and digastric and gunjan masseters  -TB      Manual Rx 3 Type  STM  -TB         Manual Rx 4    Manual Rx 4 Location  supine subocc release  -TB        User Key  (r) = Recorded By, (t) = Taken By, (c) = Cosigned By    Initials Name Provider Type    Wily Roberts, PT Physical Therapist          PT OP Goals     Row Name 02/28/20 1800          Long Term Goals    LTG Date to Achieve  02/07/20  -TB     LTG 1  Able to coordinate abdominal support without adding neck tension  -TB     LTG 1 Progress  Met  -TB     LTG 2  Demonstrate improved posture with better cervical alignment  -TB     LTG 2 Progress  Met  -TB     LTG 3  Demonstrate improve coordination of diaphragmatic breathing and rib excursion  -TB     LTG 3 Progress  Met  -TB     LTG 4  Able to coordinate breathing and support with improved placement of sound in resonators without tensing neck  -TB     LTG 4 Progress  Progressing  -TB     LTG 4 Progress Comments  better but tends to suck in her gut instead of tightening TA  -TB     LTG 5  Demonstrate relaxation techniques for jaw mms and tongue  -TB     LTG 5 Progress  Progressing  -TB     LTG 6  Able to speak  "for 15 minutes or more without fatigue  -TB     LTG 6 Progress  Ongoing  -TB        Time Calculation    PT Goal Re-Cert Due Date  03/22/20  -TB       User Key  (r) = Recorded By, (t) = Taken By, (c) = Cosigned By    Initials Name Provider Type    TB Wily Hernandez, PT Physical Therapist          Therapy Education  Education Details: plank off knees; alphabet adn Edgard; soft \"huh\" and work on coordination of TA for support              Time Calculation:                    Wily Hernandez, PT  2/28/2020  "

## 2020-03-11 ENCOUNTER — OFFICE VISIT (OUTPATIENT)
Dept: OTOLARYNGOLOGY | Facility: CLINIC | Age: 50
End: 2020-03-11

## 2020-03-11 VITALS — TEMPERATURE: 97.6 F | BODY MASS INDEX: 24.72 KG/M2 | HEIGHT: 66 IN | WEIGHT: 153.8 LBS

## 2020-03-11 DIAGNOSIS — G90.9 AUTONOMIC DYSFUNCTION: ICD-10-CM

## 2020-03-11 DIAGNOSIS — R49.8 VOICE STRAIN: ICD-10-CM

## 2020-03-11 DIAGNOSIS — J32.9 CHRONIC SINUSITIS, UNSPECIFIED LOCATION: ICD-10-CM

## 2020-03-11 DIAGNOSIS — R49.0 DYSPHONIA: Primary | ICD-10-CM

## 2020-03-11 DIAGNOSIS — R09.82 POST-NASAL DRIP: ICD-10-CM

## 2020-03-11 DIAGNOSIS — J06.9 VIRAL UPPER RESPIRATORY TRACT INFECTION: ICD-10-CM

## 2020-03-11 PROCEDURE — 99213 OFFICE O/P EST LOW 20 MIN: CPT | Performed by: OTOLARYNGOLOGY

## 2020-03-11 PROCEDURE — 31505 DIAGNOSTIC LARYNGOSCOPY: CPT | Performed by: OTOLARYNGOLOGY

## 2020-03-11 NOTE — PROGRESS NOTES
Nisreen Bernabe LPN   Patient Intake Note    Review of Systems  Review of Systems   Constitutional: Negative for chills, fatigue and fever.   HENT:        See HPI   Respiratory: Negative for cough, choking and shortness of breath.    Gastrointestinal: Negative for diarrhea, nausea and vomiting.   Neurological: Negative for dizziness, light-headedness and headaches.   Psychiatric/Behavioral: Negative for sleep disturbance.       Tobacco Use: Screening and Cessation Intervention  Social History    Tobacco Use      Smoking status: Never Smoker      Smokeless tobacco: Never Used        Nisreen Bernabe LPN  3/11/2020  15:59

## 2020-03-11 NOTE — PATIENT INSTRUCTIONS
NASAL SALINE:  Use 2 puffs each nostril 4-6 times daily and more frequently if possible.  You can buy saline spray or you can make your own and use an old spray bottle to administer  Use a humidifier at bedside  Recipe for saline:d  Water                                 1 quart  Salt (table)                        1 tablespoon  Gylcerin (or Jess Syrup)    1 teaspoon  Sodium bicarbonate           1 teaspoon  Sprays or Veronica pots are recommended    Nasal steroid use:  Using nasal steroids:  You will be prescribed one of the following nasal steroids: Flonase, Nasacort, Nasonex, Rhinocort, Qnasl, Zetonna  2 puffs each nostril 2 times daily  Start as soon as possible  If you are using Afrin for 3 days with the nasal steroid,  Use Afrin first and wait 10 minutes to allow the nose to open. Then administer nasal steroids.    ANTACID USE:  Use antacids 30 mins after every meal, 2 hours after every meal and at Bedtime  Use Gaviscon Extra (best), Tums, Rolaids, etc  Over the counter  Use 2 tsp or 2 tabs as the dose  Remember that some of these products contain Calcium or Aluminum. If you have dietary or medical issues, please inform physician   VOICE HYGIENE:    Many factors go into manufacturing what we call the voice. More goes on than just breathing out and using the voice box and mouth. Creating the voice requires good lung function, a healthy voice box, and complex muscle coordination. Any malfunction in any of the systems and voice problems can occur.    The most common problem seen in the office is hoarseness. Other types of voice problems can include breathiness, double voice, raspy voice, or simply a change in the pitch. Many things can cause a hoarse or weak voice, including laryngitis, vocal abuse (screaming, cheering, singing too loud for too long), smoking, etc. In order to treat the problem, your physician first must find the cause, then try to correct it to restore your normal voice.    The first part of a  voice analysis is a detailed history of the problem, including any habits such as smoking, to delineate possible factors. The second step is a careful examination of the head and neck, including the voice box. The examination is essential, as this eliminates any anatomic problems with the vocal cords and the surrounding structures. More detailed analysis may be used in the form of videotaping the vocal cords with a stroboscopic light, thus providing additional information.     Suggestions for Voice Use and Conservation    Treatment of voice problems depends on the cause of the problems. Fortunately, most problems are easily correctable. The following are a few suggestions you will be asked to carry out, depending on the results of your examination.    >Avoid stress  >Avoid habitually clearing throat as this strains and fatigues the vocal cords  >Avoid yelling, especially for extended periods of time  >Avoid falsetto singing  >Avoid talking when it hurts or if your neck is excessively tired.  >Do exercise regularly to maintain fitness and breathe control. Do eat correctly, to avoid acid reflux  >Drink 6-8 glasses of water daily to keep the vocal cords flexible, making it easier to speak  >Do take sips of water while speaking as swallowing helps relax the muscles of the throat and neck  >Do sip water, sniff or say the letter “H” forcefully rather than clearing throat  >Do listen to your voice as you speak to avoid trailing off or “tightness” at the end of sentences  >Do use plenty of breath while speaking. If you feel you are running out of breath, stop, breathe then continue.  >Do speak easily rather than pushing, forcing or straining  >Do stop talking and take a voice break, especially if you are fading or fatiguing  >Do increase your awareness of your voice. When does it sound better, worse? >Recording your voice or watching yourself speak in a mirror to watch for movement in the neck and shoulders can be  beneficial.    Whispering is not a protective mechanism for the voice. In fact, whispering can make your condition worse. In order to limit further damage to your voice, maintain a normal volume and tone. Only in certain circumstances will this recommendation require modification. Dr. Olivarez will tell you if that is the case.    Complete voice rest is used occasionally to treat voice and vocal cord problems. However, this is a rare situation. Dr. Olivarez will tell you if this is indicated.    A great deal of voice hygiene is based on common sense. If it hurts to talk, then a problem exists.    Vocal Strengthening Exercises    >Sit in a comfortable and upright position. The chair should have a hard back and hard arm rests.  >Rest your hands on the arms of the chair. Push down with both of your hands at the same time. Do not use too much shoulder movement, simply push with your hands. You should feel a tightening of your abdominal muscles, not your neck muscles.  >After you become comfortable with the pushing motion, begin to make a short sound (such as at/ ah) each time you push down. You can substitute other short sounds such as a vowel sound or counting.  >Be sure to take a deep breath before each push down and vocalization of the sound.  Summary of exercise sequence:   Take a deep breath in  Push down while saying ah  Do NOT hold your breath  Relax and breathe out  Repeat above sequence 15-20 times every hour or several times each day.    Speech Pathology    Frequently, physicians and speech pathologists treat voice disorders together. These are highly trained individuals who specialize in therapy for the voice box and throat. You may recognize the better as speech, swallowing or voice therapists. Dr. Olivarez may elect to refer you for evaluation and treatment, depending on your diagnosis and condition.    Special Testing    You may be referred certain tests to fully evaluate your throat, swallowing or voice.  These may include CAT scans, swallowing tests, MRIs, or videostroboscopy. You may not be familiar these tests but the results will discussed them with you at the time of your office visit and answer your questions about them.     [email or web address]

## 2020-03-11 NOTE — PROGRESS NOTES
Kike Olivarez Jr, MD     ENT FOLLOW UP NOTE     Chief Complaint   Patient presents with   • Hoarse     Follow up        HISTORY OF PRESENT ILLNESS:  Accompanied by:    Mariann Portillo is a  49 y.o. female who is here for follow up. She is ok today.  She had an episode in Jan and lost voice for 2 weeks. She is better now.  She was told to call [facility] for analysis if she worsened.  She has appt with [facility] next week.  She is using albuterol as rescue. No inhaled steroids for asthma right now.    Review of Systems  Reviewed per patient intake note and confirmed by me    Past History:  Past medical and surgical history, family history and social history reviewed and updated when appropriate.  Current medications and allergies reviewed and updated when appropriate.  Allergies:  Decongestant [pseudoephedrine]; Codimal-a [brompheniramine]; Levaquin [levofloxacin]; and Percocet [oxycodone-acetaminophen]        Vital Signs:   Temp:  [97.6 °F (36.4 °C)] 97.6 °F (36.4 °C)  EXAMINATION:  CONSTITUTIONAL:   well nourished, well-developed, alert, oriented, in no acute distress   normal weight for height  COMMUNICATION AND VOICE:   Almost carolin with slight cough, mild clearing  HEAD:   atraumatic  structure normal, no tenderness on palpation, no lesions, no evidence of trauma  SALIVARY GLANDS: parotid glands with no tenderness, no swelling, no masses, submandibular glands with normal size, nontender  BILATERAL: intact  EYES: ocular motility normal, eyelids normal, orbits normal, no proptosis, conjunctiva normal, sclera non-icteric, pupils equal, round   Color- brown   HEARING: Response to conversational voice normal bilaterally  EARS:  external ears normal to inspection and palpation, no tenderness or erythema  canals clear, normal cerumen and skin, without drainage, skin intact and healthy  tympanic membranes clear, normal landmarks and light reflex  normal light reflex and landmarks  middle ear without fluid,  ossicular chain intact  no mastoid process tenderness    NOSE:  APPEARANCE: normal, straight, with good projection, no tenderness, no lesions, no tenderness  NOSE INTERNAL:  ABNORMAL: pale, mod edema of mucosa, turbinates peal and mildly enlarged, Septum wide with DNS CL to R low mild  ORAL CAVITY:  LIPS: structure normal, no tenderness on palpation, no lesions, no evidence of trauma, normal mobility and oral competence  TEETH: dentition within normal limits for age  GUMS: gingivae healthy, no lesions  ORAL MUCOSA: oral mucosa normal, no mucosal lesions  FLOOR OF MOUTH: Warthin's duct patent, mucosa normal  TONGUE: lingual mucosa normal without lesions, normal tongue mobility  OROPHARYNX: oropharyngeal mucosa normal, tonsil fossa with normal appearance  bilateral  NECK: normal appearance, no masses, no lesions, larynx normal mobility, trachea midline  LYMPH NODES:  no adenopathy  THYROID: no overt thyromegaly, no tenderness, nodules or mass present on palpation, position midline   CHEST/RESPIRATORY: respiratory effort normal, no rales, rubs or wheezing  CARDIOVASCULAR: regular rate and rhythm, no murmurs, gallups, no peripheral edema  NEUROLOGIC/PSYCHIATRIC: oriented appropriately for age, mood normal, affect appropriate, cranial nerves intact grossly unless specifically mentioned        RESULTS REVIEW:    No reports available for review          ASSESSMENT:   Diagnosis Plan   1. Dysphonia      Markedly improved  Continuing therapy   2. Voice strain      Improved   3. Chronic sinusitis, unspecified location      No sxs now   4. Autonomic dysfunction      With neck and voice strain   5. Post-nasal drip      Improved   6. Viral upper respiratory tract infection      resolved with voice episode           PLAN:  Conservative management.  Patient is better after URI and voice issue in Jan. She will start Flonase and antacids. She campbell continue with SPEECH LANGUAGE PATHOLOGY for now.  I will await and see what Hoang says  about her.  She will call me marlen next episode happens. I may start inhaled steroids with Flonase and Antacids.  I will consider allergy testing eleanor for molds if dysphonia worsens.  Flonase BID  Nasal saline  Antacids  DENIS precautions  MY CHART:  Patient is Patient is using My Chart          Patient, Spouse understand(s) and agree(s) with the treatment plan as described.    Return if symptoms worsen or fail to improve, for Recheck voice.     Kike Olivarez Jr, MD  03/11/20  16:33

## 2020-03-11 NOTE — PROGRESS NOTES
ENT PROCEDURE NOTE  Anesthesia: none    Endoscopy Type: Indirect Laryngoscopy    Indications for Procedure:     Procedure Details:    The patient was placed in an upright position.  The mirror was used to visulaize the hypopharyn and larynx.  The nasal cavities, nasopharynx, oropharynx, hypopharynx, and larynx were all examined.  Vocal cords were examined during respiration and phonation.  The following findings were noted:    Findings:   LARYNGOSCOPY FINDINGS :    Normal BOT, lingual tonsils, no tongue prolapse, OP walls intact, HP normal and intact, Epiglottis normal, Supraglottis normal, FVC and TVCs normal with no lesions, Subglottis clear    Condition:  Stable.  Patient tolerated procedure well.    Complications:  None    Post-procedure instructions reviewed with Patient, Spouse  Instructions documented in AVS for patient to review

## 2020-03-13 ENCOUNTER — TREATMENT (OUTPATIENT)
Dept: PHYSICAL THERAPY | Facility: CLINIC | Age: 50
End: 2020-03-13

## 2020-03-13 DIAGNOSIS — R49.0 MUSCLE TENSION DYSPHONIA: ICD-10-CM

## 2020-03-13 DIAGNOSIS — M54.2 CERVICALGIA: Primary | ICD-10-CM

## 2020-03-13 PROCEDURE — 97110 THERAPEUTIC EXERCISES: CPT | Performed by: PHYSICAL THERAPIST

## 2020-03-13 PROCEDURE — 97140 MANUAL THERAPY 1/> REGIONS: CPT | Performed by: PHYSICAL THERAPIST

## 2020-03-13 NOTE — PROGRESS NOTES
Outpatient Physical Therapy Ortho Treatment Note/Discharge Summary       Patient Name: Mariann Portillo  : 1970  MRN: [id number]  Today's Date: 3/13/2020      Visit Date: 2020    Visit Dx:    ICD-10-CM ICD-9-CM   1. Cervicalgia M54.2 723.1   2. Muscle tension dysphonia R49.0 784.42       Patient Active Problem List   Diagnosis   • Shortness of breath        Past Medical History:   Diagnosis Date   • Allergic rhinitis    • Asthma    • Chronic UTI    • Endometriosis    • Fibromyalgia    • High cholesterol    • IBS (irritable bowel syndrome)    • Meniere disease    • Migraines         Past Surgical History:   Procedure Laterality Date   • CHOLECYSTECTOMY     • CYST REMOVAL     • ENDOMETRIAL BIOPSY                         PT Assessment/Plan     Row Name 20 1700          PT Assessment    Assessment Comments  She is speaking clearly with much improved volume and resonance and abdominal support. She has greatly improved on knowing how to relax her throat adn use better resonance for projection without raising her larynx. We also had worked on breathing technique to improve her diaphragmatic breathing and avoid using her accessorying mms. She still struggled some with fatigue of her voice more due to lung issues she persisted with. She was going to seek medicine for her lungs. She is ready for discharge.  -TB        PT Plan    PT Plan Comments  DC from PT.  -TB       User Key  (r) = Recorded By, (t) = Taken By, (c) = Cosigned By    Initials Name Provider Type    TB Wily Hernandez, PT Physical Therapist              OP Exercises     Row Name 20 1700 20 4168          Subjective Comments    Subjective Comments  --  She  says she feels generally  -TB        Subjective Pain    Pre-Treatment Pain Level  --  0  -TB        Total Minutes    11133 - PT Therapeutic Exercise Minutes  15  -TB  --     18690 - PT Manual Therapy Minutes  30  -TB  --        Exercise 1    Exercise Name 1  --  vocalization  with emphasis on TA support; cued to relax jaw and place resonance wtihout tightening her voice  -TB       User Key  (r) = Recorded By, (t) = Taken By, (c) = Cosigned By    Initials Name Provider Type    TB Wily Hernandez, PT Physical Therapist                        Manual Rx (last 36 hours)      Manual Treatments     Row Name 03/13/20 1700             Total Minutes    06991 - PT Manual Therapy Minutes  30  -TB         Manual Rx 1    Manual Rx 1 Location  prone CT   -TB      Manual Rx 1 Type  mob  -TB      Manual Rx 1 Grade  MFR upper thoracic pulling tissue caudally  -TB         Manual Rx 2    Manual Rx 2 Location  prone gunjan UT/LS  -TB      Manual Rx 2 Type  STM  -TB      Manual Rx 2 Grade  subocc  -TB         Manual Rx 3    Manual Rx 3 Location  supine gunjan SCM, anterior cx mms and digastric and gunjan masseters  -TB      Manual Rx 3 Type  STM  -TB         Manual Rx 4    Manual Rx 4 Location  supine subocc release  -TB        User Key  (r) = Recorded By, (t) = Taken By, (c) = Cosigned By    Initials Name Provider Type    Wily Roberts, PT Physical Therapist          PT OP Goals     Row Name 03/13/20 1700          Long Term Goals    LTG Date to Achieve  02/07/20  -TB     LTG 1  Able to coordinate abdominal support without adding neck tension  -TB     LTG 1 Progress  Met  -TB     LTG 2  Demonstrate improved posture with better cervical alignment  -TB     LTG 2 Progress  Met  -TB     LTG 3  Demonstrate improve coordination of diaphragmatic breathing and rib excursion  -TB     LTG 3 Progress  Met  -TB     LTG 4  Able to coordinate breathing and support with improved placement of sound in resonators without tensing neck  -TB     LTG 4 Progress  Met  -TB     LTG 5  Demonstrate relaxation techniques for jaw mms and tongue  -TB     LTG 5 Progress  Met  -TB     LTG 6  Able to speak for 15 minutes or more without fatigue  -TB     LTG 6 Progress  Partially Met  -TB     LTG 6 Progress Comments  still tends to fatigue  some but able to speak longer  -TB       User Key  (r) = Recorded By, (t) = Taken By, (c) = Cosigned By    Initials Name Provider Type    Wily Roberts, PT Physical Therapist          Therapy Education  Education Details: cont plank position, cont to practice vocalizations with resonance              Time Calculation:                OP PT Discharge Summary  Date of Discharge: 03/13/20  Reason for Discharge: All goals achieved  Outcomes Achieved: Able to achieve all goals within established timeline  Discharge Destination: Home with home program      Wily Hernandez, PT  3/13/2020

## 2020-08-05 LAB
ALBUMIN SERPL-MCNC: 4.8 G/DL (ref 3.5–5.2)
ALP BLD-CCNC: 84 U/L (ref 35–104)
ALT SERPL-CCNC: 12 U/L (ref 5–33)
ANION GAP SERPL CALCULATED.3IONS-SCNC: 15 MMOL/L (ref 7–19)
AST SERPL-CCNC: 18 U/L (ref 5–32)
BASOPHILS ABSOLUTE: 0.1 K/UL (ref 0–0.2)
BASOPHILS RELATIVE PERCENT: 1.4 % (ref 0–1)
BILIRUB SERPL-MCNC: 0.3 MG/DL (ref 0.2–1.2)
BUN BLDV-MCNC: 19 MG/DL (ref 6–20)
CALCIUM SERPL-MCNC: 10 MG/DL (ref 8.6–10)
CHLORIDE BLD-SCNC: 100 MMOL/L (ref 98–111)
CHOLESTEROL, TOTAL: 275 MG/DL (ref 160–199)
CO2: 25 MMOL/L (ref 22–29)
CREAT SERPL-MCNC: 0.6 MG/DL (ref 0.5–0.9)
EOSINOPHILS ABSOLUTE: 0.6 K/UL (ref 0–0.6)
EOSINOPHILS RELATIVE PERCENT: 6 % (ref 0–5)
GFR AFRICAN AMERICAN: >59
GFR NON-AFRICAN AMERICAN: >60
GLUCOSE BLD-MCNC: 85 MG/DL (ref 74–109)
HBA1C MFR BLD: 5.4 % (ref 4–6)
HCT VFR BLD CALC: 43 % (ref 37–47)
HDLC SERPL-MCNC: 72 MG/DL (ref 65–121)
HEMOGLOBIN: 14.1 G/DL (ref 12–16)
IMMATURE GRANULOCYTES #: 0.1 K/UL
LDL CHOLESTEROL CALCULATED: 177 MG/DL
LYMPHOCYTES ABSOLUTE: 3.5 K/UL (ref 1.1–4.5)
LYMPHOCYTES RELATIVE PERCENT: 36.2 % (ref 20–40)
MCH RBC QN AUTO: 31.1 PG (ref 27–31)
MCHC RBC AUTO-ENTMCNC: 32.8 G/DL (ref 33–37)
MCV RBC AUTO: 94.7 FL (ref 81–99)
MONOCYTES ABSOLUTE: 0.5 K/UL (ref 0–0.9)
MONOCYTES RELATIVE PERCENT: 5.6 % (ref 0–10)
NEUTROPHILS ABSOLUTE: 4.9 K/UL (ref 1.5–7.5)
NEUTROPHILS RELATIVE PERCENT: 50.3 % (ref 50–65)
PDW BLD-RTO: 11.9 % (ref 11.5–14.5)
PLATELET # BLD: 433 K/UL (ref 130–400)
PMV BLD AUTO: 9.4 FL (ref 9.4–12.3)
POTASSIUM SERPL-SCNC: 4.5 MMOL/L (ref 3.5–5)
RBC # BLD: 4.54 M/UL (ref 4.2–5.4)
SODIUM BLD-SCNC: 140 MMOL/L (ref 136–145)
T4 FREE: 1.03 NG/DL (ref 0.93–1.7)
TOTAL PROTEIN: 7.7 G/DL (ref 6.6–8.7)
TRIGL SERPL-MCNC: 128 MG/DL (ref 0–149)
TSH SERPL DL<=0.05 MIU/L-ACNC: 1.47 UIU/ML (ref 0.27–4.2)
VITAMIN B-12: 487 PG/ML (ref 211–946)
VITAMIN D 25-HYDROXY: 58.9 NG/ML
WBC # BLD: 9.7 K/UL (ref 4.8–10.8)

## 2020-10-07 ENCOUNTER — OFFICE VISIT (OUTPATIENT)
Dept: CARDIOLOGY | Facility: CLINIC | Age: 50
End: 2020-10-07

## 2020-10-07 VITALS
BODY MASS INDEX: 24.43 KG/M2 | HEIGHT: 66 IN | OXYGEN SATURATION: 99 % | WEIGHT: 152 LBS | SYSTOLIC BLOOD PRESSURE: 86 MMHG | DIASTOLIC BLOOD PRESSURE: 52 MMHG | HEART RATE: 128 BPM

## 2020-10-07 DIAGNOSIS — R42 DIZZY SPELLS: ICD-10-CM

## 2020-10-07 DIAGNOSIS — R06.02 SHORTNESS OF BREATH: Primary | ICD-10-CM

## 2020-10-07 DIAGNOSIS — R00.2 PALPITATIONS: ICD-10-CM

## 2020-10-07 PROCEDURE — 93000 ELECTROCARDIOGRAM COMPLETE: CPT | Performed by: INTERNAL MEDICINE

## 2020-10-07 PROCEDURE — 99204 OFFICE O/P NEW MOD 45 MIN: CPT | Performed by: INTERNAL MEDICINE

## 2020-10-07 RX ORDER — MIDODRINE HYDROCHLORIDE 2.5 MG/1
2.5 TABLET ORAL
Qty: 90 TABLET | Refills: 3 | Status: SHIPPED | OUTPATIENT
Start: 2020-10-07 | End: 2020-12-19 | Stop reason: SDUPTHER

## 2020-10-07 NOTE — PROGRESS NOTES
Mariann Portillo  [id number]  1970  50 y.o.  female    Referring Provider: Caitlyn Reese MD    Reason for  Visit:  Initial visit for palpitations and shortness of breath   Subjective    Mild chronic exertional shortness of breath on exertion relieved with rest  No significant cough or wheezing    No palpitations  No associated chest pain  No significant pedal edema    No fever or chills  No significant expectoration    No hemoptysis  No presyncope or syncope    Tolerating current medications well with no untoward side effects   Compliant with prescribed medication regimen. Tries to adhere to cardiac diet.     Intermittent palpitations, almost daily  lasting for less than 1 minute  Associated symptoms of dizziness, weakness,   No chest pain,    Has associated shortness of breath    Started > 10 months ago    Comes close to passing down     History of present illness:  Mariann Portillo is a 50 y.o. yo female with history of fibromyalgia who presents today for   Chief Complaint   Patient presents with   • Dizziness     NEW PT - EPISODES OF HYPOTENSION/HYPERTENSION   • Fatigue   • Shortness of Breath   .    History  Past Medical History:   Diagnosis Date   • Allergic rhinitis    • Asthma    • Chronic UTI    • Endometriosis    • Fibromyalgia    • High cholesterol    • Hypertension    • IBS (irritable bowel syndrome)    • Meniere disease    • Migraines    ,   Past Surgical History:   Procedure Laterality Date   • CHOLECYSTECTOMY     • CYST REMOVAL     • ENDOMETRIAL BIOPSY     ,   Family History   Problem Relation Age of Onset   • Cancer Mother    • Heart disease Father    ,   Social History     Tobacco Use   • Smoking status: Never Smoker   • Smokeless tobacco: Never Used   Substance Use Topics   • Alcohol use: No     Frequency: Never   • Drug use: Defer   ,     Medications  Current Outpatient Medications   Medication Sig Dispense Refill   • albuterol sulfate HFA (PROAIR HFA) 108 (90 Base) MCG/ACT inhaler Every 4  (Four) Hours.     • dicyclomine (BENTYL) 10 MG capsule Take 10 mg by mouth.     • imipramine (TOFRANIL) 25 MG tablet      • Melatonin 10 MG tablet Take  by mouth.     • metFORMIN (GLUCOPHAGE) 850 MG tablet      • mometasone (ELOCON) 0.1 % cream      • nitrofurantoin (MACRODANTIN) 100 MG capsule Take 100 mg by mouth.     • ondansetron (ZOFRAN) 4 MG tablet Take 1 tablet by mouth Every 8 (Eight) Hours As Needed for Nausea or Vomiting. 18 tablet 1   • PB-Hyoscy-Atropine-Scopolamine (DONNATAL) 16.2 MG per tablet Take 1 tablet by mouth Every 6 (Six) Hours As Needed.     • phenazopyridine (PYRIDIUM) 100 MG tablet Take 100 mg by mouth.     • pimecrolimus (ELIDEL) 1 % cream TRINITY ON THE SKIN BID  1   • polyethylene glycol (MIRALAX) powder Take 17 g by mouth As Needed.     • therapeutic multivitamin-minerals (THERAGRAN-M) tablet Take 1 tablet by mouth.     • Triamcinolone Acetonide (NASACORT) 55 MCG/ACT nasal inhaler 2 sprays into the nostril(s) as directed by provider 2 (Two) Times a Day. 2 bottle 3   • vitamin B-12 (CYANOCOBALAMIN) 1000 MCG tablet Take 1,000 mcg by mouth.     • vitamin D (ERGOCALCIFEROL) 67479 units capsule capsule Every 14 (Fourteen) Days.     • zolpidem CR (AMBIEN CR) 6.25 MG CR tablet      • midodrine (PROAMATINE) 2.5 MG tablet Take 1 tablet by mouth 3 (Three) Times a Day Before Meals. 90 tablet 3     No current facility-administered medications for this visit.        Allergies:  Decongestant [pseudoephedrine], Codimal-a [brompheniramine], Levaquin [levofloxacin], and Percocet [oxycodone-acetaminophen]    Review of Systems  Review of Systems   Constitution: Negative.   HENT: Negative.    Eyes: Negative.    Cardiovascular: Positive for dyspnea on exertion, palpitations and syncope. Negative for chest pain, claudication, cyanosis, irregular heartbeat, leg swelling, near-syncope, orthopnea and paroxysmal nocturnal dyspnea.   Respiratory: Negative.    Endocrine: Negative.    Hematologic/Lymphatic: Negative.   "  Skin: Negative.    Gastrointestinal: Negative for anorexia.   Genitourinary: Negative.    Neurological: Positive for dizziness.   Psychiatric/Behavioral: Negative.        Objective     Physical Exam:  BP (!) 86/52   Pulse (!) 128   Ht 167.6 cm (66\")   Wt 68.9 kg (152 lb)   SpO2 99%   BMI 24.53 kg/m²     Physical Exam  Constitutional:       Appearance: Normal appearance. She is well-developed.   HENT:      Head: Normocephalic.   Eyes:      General: Lids are normal.   Neck:      Musculoskeletal: No edema.      Vascular: Normal carotid pulses. No carotid bruit or JVD.      Trachea: No tracheal tenderness or tracheal deviation.   Cardiovascular:      Rate and Rhythm: Regular rhythm.      Pulses: Normal pulses.      Heart sounds: Normal heart sounds, S1 normal and S2 normal. No systolic murmur.   Pulmonary:      Effort: Pulmonary effort is normal.      Breath sounds: No stridor.   Abdominal:      General: There is no distension.      Palpations: Abdomen is soft.      Tenderness: There is no abdominal tenderness.   Skin:     General: Skin is warm.   Neurological:      Mental Status: She is alert.      Cranial Nerves: No cranial nerve deficit.      Sensory: No sensory deficit.   Psychiatric:         Speech: Speech normal.         Behavior: Behavior normal.         Thought Content: Thought content normal.         Results Review:       ECG 12 Lead    Date/Time: 10/7/2020 11:57 AM  Performed by: Colton Arguelles MD  Authorized by: Colton Arguelles MD   Comparison: not compared with previous ECG   Rhythm: sinus tachycardia  Rate: tachycardic  Conduction: conduction normal  ST Segments: ST segments normal  T Waves: T waves normal  QRS axis: normal  Other: no other findings    Clinical impression: normal ECG            Assessment/Plan   Mariann was seen today for dizziness, fatigue and shortness of breath.    Diagnoses and all orders for this visit:    Shortness of breath    Dizzy spells    Palpitations  -     Adult Transthoracic " Echo Complete W/ Cont if Necessary Per Protocol; Future  -     Holter Monitor - 72 Hour Up To 21 Days; Future    Other orders  -     ECG 12 Lead  -     midodrine (PROAMATINE) 2.5 MG tablet; Take 1 tablet by mouth 3 (Three) Times a Day Before Meals.           ____________________________________________________________________________________________________________________________________________  Health maintenance and recommendations    The patient is advised to reduce or avoid caffeine or other cardiac stimulants.   Minimize or avoid  NSAID-type medications      Monitor for any signs of bleeding including red or dark stools. Fall precautions.   Advised staying uptodate with immunizations per established standard guidelines.    Offered to give patient  a copy of my notes     Questions were encouraged, asked and answered to the patient's  understanding and satisfaction. Questions if any regarding current medications and side effects, need for refills and importance of compliance to medications stressed.    Reviewed available prior notes, consults, prior visits, laboratory findings, radiology and cardiology relevant reports. Updated chart as applicable. I have reviewed the patient's medical history in detail and updated the computerized patient record as relevant.      Updated patient regarding any new or relevant abnormalities on review of records or any new findings on physical exam. Mentioned to patient about purpose of visit and desirable health short and long term goals and objectives.    Primary to monitor CBC CMP Lipid panel and TSH as applicable    ___________________________________________________________________________________________________________________________________________    Plan    Orders Placed This Encounter   Procedures   • Holter Monitor - 72 Hour Up To 21 Days     14 days     Standing Status:   Future     Standing Expiration Date:   10/7/2021     Order Specific Question:   Reason for  exam?     Answer:   Palpitations   • ECG 12 Lead     This order was created via procedure documentation   • Adult Transthoracic Echo Complete W/ Cont if Necessary Per Protocol     Myocardial strain to be performed during echocardiogram as long as technically feasible     Standing Status:   Future     Standing Expiration Date:   10/7/2021     Order Specific Question:   Reason for exam?     Answer:   Dyspnea        Requested Prescriptions     Signed Prescriptions Disp Refills   • midodrine (PROAMATINE) 2.5 MG tablet 90 tablet 3     Sig: Take 1 tablet by mouth 3 (Three) Times a Day Before Meals.    She will stop Midodrine if she has reaction ( allergic to Sudafed with chest pain)    Compression stockings, increase fluids so that there is clear urine every 2 hours, and use back brace with abdominal binder.   Elevate head end of bed 6 inches while sleeping    Monitor BP and bring   May need referral to autonomic clinic in future   Tilt table 10 years ago was told to be normal       Return in about 6 weeks (around 11/18/2020).

## 2020-11-02 ENCOUNTER — HOSPITAL ENCOUNTER (OUTPATIENT)
Dept: CARDIOLOGY | Facility: HOSPITAL | Age: 50
Discharge: HOME | End: 2020-11-02
Admitting: INTERNAL MEDICINE

## 2020-11-02 VITALS
SYSTOLIC BLOOD PRESSURE: 113 MMHG | BODY MASS INDEX: 24.41 KG/M2 | HEIGHT: 66 IN | WEIGHT: 151.9 LBS | DIASTOLIC BLOOD PRESSURE: 68 MMHG

## 2020-11-02 DIAGNOSIS — R00.2 PALPITATIONS: ICD-10-CM

## 2020-11-02 PROCEDURE — 93306 TTE W/DOPPLER COMPLETE: CPT | Performed by: INTERNAL MEDICINE

## 2020-11-02 PROCEDURE — 93356 MYOCRD STRAIN IMG SPCKL TRCK: CPT

## 2020-11-02 PROCEDURE — 93356 MYOCRD STRAIN IMG SPCKL TRCK: CPT | Performed by: INTERNAL MEDICINE

## 2020-11-02 PROCEDURE — 93306 TTE W/DOPPLER COMPLETE: CPT

## 2020-11-04 LAB
BH CV ECHO MEAS - AO MAX PG (FULL): 1 MMHG
BH CV ECHO MEAS - AO MAX PG: 2.5 MMHG
BH CV ECHO MEAS - AO MEAN PG (FULL): 1 MMHG
BH CV ECHO MEAS - AO MEAN PG: 2 MMHG
BH CV ECHO MEAS - AO ROOT AREA (BSA CORRECTED): 1.7
BH CV ECHO MEAS - AO ROOT AREA: 7.5 CM^2
BH CV ECHO MEAS - AO ROOT DIAM: 3.1 CM
BH CV ECHO MEAS - AO V2 MAX: 79.6 CM/SEC
BH CV ECHO MEAS - AO V2 MEAN: 59.9 CM/SEC
BH CV ECHO MEAS - AO V2 VTI: 17 CM
BH CV ECHO MEAS - AVA(I,A): 1.7 CM^2
BH CV ECHO MEAS - AVA(I,D): 1.7 CM^2
BH CV ECHO MEAS - AVA(V,A): 2.2 CM^2
BH CV ECHO MEAS - AVA(V,D): 2.2 CM^2
BH CV ECHO MEAS - BSA(HAYCOCK): 1.8 M^2
BH CV ECHO MEAS - BSA: 1.8 M^2
BH CV ECHO MEAS - BZI_BMI: 24.5 KILOGRAMS/M^2
BH CV ECHO MEAS - BZI_METRIC_HEIGHT: 167.6 CM
BH CV ECHO MEAS - BZI_METRIC_WEIGHT: 68.9 KG
BH CV ECHO MEAS - EDV(CUBED): 72 ML
BH CV ECHO MEAS - EDV(MOD-SP4): 91.9 ML
BH CV ECHO MEAS - EDV(TEICH): 76.8 ML
BH CV ECHO MEAS - EF(CUBED): 54.9 %
BH CV ECHO MEAS - EF(MOD-SP4): 46.8 %
BH CV ECHO MEAS - EF(TEICH): 47.1 %
BH CV ECHO MEAS - ESV(CUBED): 32.5 ML
BH CV ECHO MEAS - ESV(MOD-SP4): 48.9 ML
BH CV ECHO MEAS - ESV(TEICH): 40.6 ML
BH CV ECHO MEAS - FS: 23.3 %
BH CV ECHO MEAS - IVS/LVPW: 1.3
BH CV ECHO MEAS - IVSD: 0.92 CM
BH CV ECHO MEAS - LA 3D VOL INDEX: 17.8
BH CV ECHO MEAS - LA DIMENSION: 2.5 CM
BH CV ECHO MEAS - LA/AO: 0.81
BH CV ECHO MEAS - LAT PEAK E' VEL: 9.9 CM/SEC
BH CV ECHO MEAS - LV DIASTOLIC VOL/BSA (35-75): 51.6 ML/M^2
BH CV ECHO MEAS - LV MASS(C)D: 104.1 GRAMS
BH CV ECHO MEAS - LV MASS(C)DI: 58.5 GRAMS/M^2
BH CV ECHO MEAS - LV MAX PG: 1.5 MMHG
BH CV ECHO MEAS - LV MEAN PG: 1 MMHG
BH CV ECHO MEAS - LV SYSTOLIC VOL/BSA (12-30): 27.5 ML/M^2
BH CV ECHO MEAS - LV V1 MAX: 62 CM/SEC
BH CV ECHO MEAS - LV V1 MEAN: 36.3 CM/SEC
BH CV ECHO MEAS - LV V1 VTI: 9.9 CM
BH CV ECHO MEAS - LVIDD: 4.2 CM
BH CV ECHO MEAS - LVIDS: 3.2 CM
BH CV ECHO MEAS - LVLD AP4: 7.4 CM
BH CV ECHO MEAS - LVLS AP4: 6.5 CM
BH CV ECHO MEAS - LVOT AREA (M): 2.8 CM^2
BH CV ECHO MEAS - LVOT AREA: 2.8 CM^2
BH CV ECHO MEAS - LVOT DIAM: 1.9 CM
BH CV ECHO MEAS - LVPWD: 0.73 CM
BH CV ECHO MEAS - MED PEAK E' VEL: 7.29 CM/SEC
BH CV ECHO MEAS - MV A MAX VEL: 73.3 CM/SEC
BH CV ECHO MEAS - MV DEC SLOPE: 399 CM/SEC^2
BH CV ECHO MEAS - MV DEC TIME: 0.16 SEC
BH CV ECHO MEAS - MV E MAX VEL: 62.2 CM/SEC
BH CV ECHO MEAS - MV E/A: 0.85
BH CV ECHO MEAS - SI(AO): 72.1 ML/M^2
BH CV ECHO MEAS - SI(CUBED): 22.2 ML/M^2
BH CV ECHO MEAS - SI(LVOT): 15.8 ML/M^2
BH CV ECHO MEAS - SI(MOD-SP4): 24.2 ML/M^2
BH CV ECHO MEAS - SI(TEICH): 20.3 ML/M^2
BH CV ECHO MEAS - SV(AO): 128.3 ML
BH CV ECHO MEAS - SV(CUBED): 39.5 ML
BH CV ECHO MEAS - SV(LVOT): 28.2 ML
BH CV ECHO MEAS - SV(MOD-SP4): 43 ML
BH CV ECHO MEAS - SV(TEICH): 36.2 ML
BH CV ECHO MEAS - TR MAX VEL: 100 CM/SEC
BH CV ECHO MEASUREMENTS AVERAGE E/E' RATIO: 7.24
LEFT ATRIUM VOLUME: 31.7 CM3

## 2020-11-16 ENCOUNTER — HOSPITAL ENCOUNTER (OUTPATIENT)
Dept: CT IMAGING | Age: 50
Discharge: HOME | End: 2020-11-16
Payer: COMMERCIAL

## 2020-11-16 PROCEDURE — 71250 CT THORAX DX C-: CPT

## 2020-11-17 DIAGNOSIS — R91.8 MULTIPLE LUNG NODULES: ICD-10-CM

## 2020-11-17 NOTE — PROGRESS NOTES
Spoke with patient and relayed test results. Patient voiced understanding. Reminded patient to keep appointment on 11/19/20.

## 2020-11-17 NOTE — PROGRESS NOTES
Let pt know this looked ok.  Everything looks stable and not very suspicious.  We will want to keep an eye on it and repeat imaging down the road.  Nothing else to do for now.  Keep follow up as planned

## 2020-11-19 ENCOUNTER — OFFICE VISIT (OUTPATIENT)
Dept: PULMONOLOGY | Facility: CLINIC | Age: 50
End: 2020-11-19

## 2020-11-19 DIAGNOSIS — R91.8 MULTIPLE LUNG NODULES: Primary | ICD-10-CM

## 2020-11-19 DIAGNOSIS — R42 DIZZY SPELLS: ICD-10-CM

## 2020-11-19 DIAGNOSIS — Z87.440 HISTORY OF RECURRENT URINARY TRACT INFECTION: ICD-10-CM

## 2020-11-19 DIAGNOSIS — R06.02 SHORTNESS OF BREATH: ICD-10-CM

## 2020-11-19 PROCEDURE — 99442 PR PHYS/QHP TELEPHONE EVALUATION 11-20 MIN: CPT | Performed by: INTERNAL MEDICINE

## 2020-11-19 NOTE — PROGRESS NOTES
Subjective   Mariann Portillo is a 50 y.o. female.     Background: Pt with dyspnea and multiple lung nodules identified 9/2018    This visit has been rescheduled as a phone visit to comply with patient safety concerns in accordance with CDC recommendations. Total time of discussion was 17 minutes.     Chief complaint:  Lung nodules    History of Present Illness   She has had some worse dizzy spells and might and is being evaluated for autonomic insufficiency.  She follows up regarding nodules followed with serial ct.  These are present continuously for undetermined time with no modifying factors or associated symptoms.  She reports urinary tract infections for which she takes macrodantin.    Medical/Family/Social History   has a past medical history of Allergic rhinitis, Asthma, Chronic UTI, Endometriosis, Fibromyalgia, High cholesterol, Hypertension, IBS (irritable bowel syndrome), Meniere disease, and Migraines.   has a past surgical history that includes Cyst Removal; Endometrial biopsy; and Cholecystectomy.  family history includes Breast cancer (age of onset: 58) in her mother; Cancer in her mother; Heart disease in her father.   reports that she has never smoked. She has never used smokeless tobacco. Drug use questions deferred to the physician. She reports that she does not drink alcohol.  Allergies   Allergen Reactions   • Decongestant [Pseudoephedrine] Shortness Of Breath and Swelling     Per patient she is allergic to Actifed and anything like that.    • Codimal-A [Brompheniramine] Itching   • Levaquin [Levofloxacin] Itching     Per patient   • Percocet [Oxycodone-Acetaminophen] Nausea And Vomiting     Medications    Current Outpatient Medications:   •  albuterol sulfate HFA (PROAIR HFA) 108 (90 Base) MCG/ACT inhaler, Every 4 (Four) Hours., Disp: , Rfl:   •  imipramine (TOFRANIL) 25 MG tablet, , Disp: , Rfl:   •  Melatonin 10 MG tablet, Take  by mouth., Disp: , Rfl:   •  metFORMIN (GLUCOPHAGE) 850 MG tablet,  850 mg 3 (Three) Times a Day., Disp: , Rfl:   •  midodrine (PROAMATINE) 2.5 MG tablet, Take 1 tablet by mouth 3 (Three) Times a Day Before Meals., Disp: 90 tablet, Rfl: 3  •  mometasone (ELOCON) 0.1 % cream, , Disp: , Rfl:   •  nitrofurantoin (MACRODANTIN) 100 MG capsule, Take 100 mg by mouth., Disp: , Rfl:   •  ondansetron (ZOFRAN) 4 MG tablet, Take 1 tablet by mouth Every 8 (Eight) Hours As Needed for Nausea or Vomiting., Disp: 18 tablet, Rfl: 1  •  PB-Hyoscy-Atropine-Scopolamine (DONNATAL) 16.2 MG per tablet, Take 1 tablet by mouth Every 6 (Six) Hours As Needed., Disp: , Rfl:   •  phenazopyridine (PYRIDIUM) 100 MG tablet, Take 100 mg by mouth., Disp: , Rfl:   •  pimecrolimus (ELIDEL) 1 % cream, TIRNITY ON THE SKIN BID, Disp: , Rfl: 1  •  polyethylene glycol (MIRALAX) powder, Take 17 g by mouth As Needed., Disp: , Rfl:   •  therapeutic multivitamin-minerals (THERAGRAN-M) tablet, Take 1 tablet by mouth., Disp: , Rfl:   •  vitamin B-12 (CYANOCOBALAMIN) 1000 MCG tablet, Take 1,000 mcg by mouth., Disp: , Rfl:   •  vitamin D (ERGOCALCIFEROL) 19799 units capsule capsule, Every 14 (Fourteen) Days., Disp: , Rfl:   •  zolpidem CR (AMBIEN CR) 6.25 MG CR tablet, , Disp: , Rfl:          PFT Values        Some values may be hidden. Unless noted otherwise, only the newest values recorded on each date are displayed.         Old Values PFT Results 2/17/20   No data to display.      Pre Drug PFT Results 2/17/20   FVC 93   FEV1 90   FEF 25-75% 85   FEV1/FVC 79.44      Post Drug PFT Results 2/17/20   No data to display.      Other Tests PFT Results 2/17/20   TLC 79   RV 64   DLCO 70   D/VAsb 84            Examination. CT CHEST WO CONTRAST 11/16/2020 2:53 PM  History: Multiple lung nodules.  DLP: 141 mGycm.  The CT scan of the chest is performed without intravenous contrast  enhancement. The images are acquired in axial plane with subsequent  reconstruction in coronal and sagittal planes.  The comparison is made with the previous study  dated 11/21/2019.  A groundglass infiltrate are a cluster of tiny nodules in the right  upper lobe is reidentified. Some of these nodules are calcified. No  change in the previous study.  An endotracheal nodularity/groundglass opacity located laterally in  the right upper lobe is similar to the previous study. No interval  change.  A focus of interstitial infiltrate in the right lower lobe adjacent to  the right diaphragm is unchanged.  A Small area of interstitial infiltrate is seen in the left upper lobe  anteriorly, image #16 axial plane, which is unchanged. No discrete  mass or nodule.  The included part of the soft tissues of the neck is unremarkable.  Incompletely visualized thyroid gland appears unremarkable.  There is no exiting lymphadenopathy.  The breast parenchyma is heterogeneously dense and not optimally  evaluated in this study.  Atheromatous changes thoracic aorta are seen. No aneurysmal  dilatation. There is no evidence of mediastinal lymphadenopathy. There  are atheromatous changes of coronary arteries.  The visualized unenhanced liver and spleen are unremarkable. There are  degenerative glands are unremarkable. The pancreas and kidneys are  incompletely visualized and appears unremarkable. The gallbladder is  surgically absent.  The images reviewed in bone window show no acute bony abnormality.  IMPRESSION:  Above-mentioned cluster of nodules/nodular infiltrate,  groundglass infiltrate and for cytology of interstitial infiltrate in  the lungs bilaterally as detailed above probably represent subacute or  chronic inflammatory process. No discrete features of malignancy.  Another follow-up examination in 12-18 months may be obtained to  ensure stability/resolution.  Signed by Dr Gloria Danielle on 11/16/2020 4:48 PM    Assessment/Plan   Problem List Items Addressed This Visit        Pulmonary Problems    Shortness of breath       Other    Dizzy spells      Other Visit Diagnoses     Multiple lung  nodules    -  Primary    History of recurrent urinary tract infection              She has lung nodules and persistent rul ground glass opacity.  Differential dx includes granulomatous process, adenocarcinoma in situ, other inflammatory process.  Plan continue follow up ct scan next year.  Discussed macrodantin use, consider discontinuing if possible, although she discusses she has severe problems with UTIs without it. Ct imaging is not inconsistent with but also not classic for nitrofurantoin induced lung disease.  She will call prn any deterioration between now and next year.       Electronically signed by Khanh Mendoza MD, 11/19/2020, 17:06 CST

## 2020-11-21 ENCOUNTER — TELEMEDICINE (OUTPATIENT)
Dept: CARDIOLOGY | Facility: CLINIC | Age: 50
End: 2020-11-21

## 2020-11-21 VITALS — WEIGHT: 153 LBS | HEIGHT: 66 IN | BODY MASS INDEX: 24.59 KG/M2

## 2020-11-21 DIAGNOSIS — R00.2 PALPITATIONS: Primary | ICD-10-CM

## 2020-11-21 DIAGNOSIS — I47.1 PAROXYSMAL SVT (SUPRAVENTRICULAR TACHYCARDIA) (HCC): ICD-10-CM

## 2020-11-21 DIAGNOSIS — R06.02 SHORTNESS OF BREATH: ICD-10-CM

## 2020-11-21 DIAGNOSIS — R42 DIZZY SPELLS: ICD-10-CM

## 2020-11-21 PROBLEM — I47.10 PAROXYSMAL SVT (SUPRAVENTRICULAR TACHYCARDIA): Status: ACTIVE | Noted: 2020-11-21

## 2020-11-21 PROCEDURE — 99442 PR PHYS/QHP TELEPHONE EVALUATION 11-20 MIN: CPT | Performed by: INTERNAL MEDICINE

## 2020-11-21 RX ORDER — ATENOLOL 25 MG/1
25 TABLET ORAL DAILY
Qty: 90 TABLET | Refills: 3 | Status: SHIPPED | OUTPATIENT
Start: 2020-11-21 | End: 2020-12-19 | Stop reason: SDUPTHER

## 2020-11-21 NOTE — PROGRESS NOTES
Mariann Portillo  [id number]  1970  50 y.o.  female      This is a telephonic visit  You have chosen to receive care through a telephone visit. Do you consent to use a telephone visit for your medical care today? Yes    Referring Provider: Caitlyn Reese MD    Reason for  Visit:    .  short term office follow up after recent encounter   Initial visit for palpitations and shortness of breath   Subjective      Overall similar symptoms  Was prescribed midodrine last visit  She has not started this yet  Has had an echo in the interim with results as below  Zio patch showed brief supraventricular tachycardia  Mild chronic exertional shortness of breath on exertion relieved with rest  No significant cough or wheezing    No palpitations  No associated chest pain  No significant pedal edema    No fever or chills  No significant expectoration    No hemoptysis  No presyncope or syncope    Tolerating current medications well with no untoward side effects   Compliant with prescribed medication regimen. Tries to adhere to cardiac diet.     Intermittent palpitations, almost daily  lasting for less than 1 minute  Associated symptoms of dizziness, weakness,   No chest pain,    Has associated shortness of breath    Started > 10 months ago    Comes close to passing down     History of present illness:  Mariann Portillo is a 50 y.o. yo female with history of fibromyalgia who presents today for   Chief Complaint   Patient presents with   • Follow-up   .    History  Past Medical History:   Diagnosis Date   • Allergic rhinitis    • Asthma    • Chronic UTI    • Endometriosis    • Fibromyalgia    • High cholesterol    • Hypertension    • IBS (irritable bowel syndrome)    • Meniere disease    • Migraines    ,   Past Surgical History:   Procedure Laterality Date   • CHOLECYSTECTOMY     • CYST REMOVAL     • ENDOMETRIAL BIOPSY     ,   Family History   Problem Relation Age of Onset   • Cancer Mother    • Breast cancer Mother 58   • Heart  disease Father    ,   Social History     Tobacco Use   • Smoking status: Never Smoker   • Smokeless tobacco: Never Used   Substance Use Topics   • Alcohol use: No     Frequency: Never   • Drug use: Defer   ,     Medications  Current Outpatient Medications   Medication Sig Dispense Refill   • albuterol sulfate HFA (PROAIR HFA) 108 (90 Base) MCG/ACT inhaler Every 4 (Four) Hours.     • atenolol (TENORMIN) 25 MG tablet Take 1 tablet by mouth Daily. 90 tablet 3   • imipramine (TOFRANIL) 25 MG tablet      • Melatonin 10 MG tablet Take  by mouth.     • metFORMIN (GLUCOPHAGE) 850 MG tablet 850 mg 3 (Three) Times a Day.     • midodrine (PROAMATINE) 2.5 MG tablet Take 1 tablet by mouth 3 (Three) Times a Day Before Meals. 90 tablet 3   • mometasone (ELOCON) 0.1 % cream      • nitrofurantoin (MACRODANTIN) 100 MG capsule Take 100 mg by mouth.     • ondansetron (ZOFRAN) 4 MG tablet Take 1 tablet by mouth Every 8 (Eight) Hours As Needed for Nausea or Vomiting. 18 tablet 1   • PB-Hyoscy-Atropine-Scopolamine (DONNATAL) 16.2 MG per tablet Take 1 tablet by mouth Every 6 (Six) Hours As Needed.     • phenazopyridine (PYRIDIUM) 100 MG tablet Take 100 mg by mouth.     • pimecrolimus (ELIDEL) 1 % cream TRINITY ON THE SKIN BID  1   • polyethylene glycol (MIRALAX) powder Take 17 g by mouth As Needed.     • therapeutic multivitamin-minerals (THERAGRAN-M) tablet Take 1 tablet by mouth.     • vitamin B-12 (CYANOCOBALAMIN) 1000 MCG tablet Take 1,000 mcg by mouth.     • vitamin D (ERGOCALCIFEROL) 02698 units capsule capsule Every 14 (Fourteen) Days.     • zolpidem CR (AMBIEN CR) 6.25 MG CR tablet        No current facility-administered medications for this visit.        Allergies:  Decongestant [pseudoephedrine], Codimal-a [brompheniramine], Levaquin [levofloxacin], and Percocet [oxycodone-acetaminophen]    Review of Systems  Review of Systems   Constitution: Negative.   HENT: Negative.    Eyes: Negative.    Cardiovascular: Positive for dyspnea on  "exertion, palpitations and syncope. Negative for chest pain, claudication, cyanosis, irregular heartbeat, leg swelling, near-syncope, orthopnea and paroxysmal nocturnal dyspnea.   Respiratory: Negative.    Endocrine: Negative.    Hematologic/Lymphatic: Negative.    Skin: Negative.    Gastrointestinal: Negative for anorexia.   Genitourinary: Negative.    Neurological: Positive for dizziness.   Psychiatric/Behavioral: Negative.        Objective     Physical Exam:  Ht 167.6 cm (66\")   Wt 69.4 kg (153 lb)   BMI 24.69 kg/m²   Self reported vitals above as available      • General demeanor : Patient is calm and composed.  There is no distress.  Patient can converse normally    • Description of patient's judgment and insight: Normal  • Brief assessment of mental status, which may include:  - Orientation to time, place, and person  - Recent and remote memory intact  - Mood and affect is normal  • Per patient no problems with vision or eyes that is new  • Per self examination of patient no issues with her ears or nose or gums or teeth  • Assessment of hearing: Normal  • self examination of patient no reported mass in her neck    • Patient can carry out her normal speech and does not have any respiratory difficulties  • Per self examination of patient no significant lower extremity edema  • Per self examination of patient no reported skin issues that is new  • Per self-examination gait is normal and unchanged from baseline   • Per self examination of patient digits and nails are normal without any cyanosis or swelling  • Per self examination of patient no issues with joints or bones that is new  • Assessment of range of motion with notation of any pain, crepitation or contracture           Results Review:    Results for orders placed during the hospital encounter of 11/02/20   Adult Transthoracic Echo Complete W/ Cont if Necessary Per Protocol    Narrative · Left ventricular ejection fraction appears to be 56 - 60%.  · Left " ventricular diastolic function was normal.  · Abnormal global longitudinal LV strain (GLS) = -15.8%.  · No pulmonary hypertension        Mariann Portillo  Holter Monitor 72Hr-21Day (0296T/0298T)  Order# [id number]  Reading physician: Colton Arguelles MD Ordering physician: Colton Arguelles MD Study date: 10/7/20   Patient Information    Patient Name   Mariann Portillo MRN   [id number] Sex   Female  (Age)   1970 (50 y.o.)   Interpretation Summary       · Average HR: 103. Min HR: 68. Max HR: 174.     · Entire report was reviewed.  Monitoring in days: ~14   · The predominant rhythm noted during the testing period was sinus rhythm.     · Rare supraventricular ectopics with an APC burden of: < 1%    · Rare premature ventricular contractions with a PVC burden of: < 1%     · No correlated arrhythmia  · No significant pauses                  Conclusion:      Baseline rhythm is sinus.  No significant ectopy   2 runs of supraventricular tachycardia longest 6 beats at a maximum rate of 174 bpm and an average of 162 bpm                Procedures    Assessment/Plan   Diagnoses and all orders for this visit:    1. Palpitations (Primary)    2. Shortness of breath    3. Dizzy spells  -     Compression Thigh High Stockings    4. Paroxysmal SVT (supraventricular tachycardia) (CMS/HCC)    Other orders  -     atenolol (TENORMIN) 25 MG tablet; Take 1 tablet by mouth Daily.  Dispense: 90 tablet; Refill: 3           Plan    Discussed results of prior testing with patient: echo and Zio patch   Patient expressed understanding  Encouraged and answered all questions   Discussed with the patient and all questioned fully answered. She will call me if any problems arise.       She will stop Midodrine if she has reaction ( allergic to Sudafed with chest pain)    Compression stockings, increase fluids so that there is clear urine every 2 hours, and use back brace with abdominal binder.   Elevate head end of bed 6 inches while sleeping    Monitor BP and  bring   May need referral to autonomic clinic in future   Tilt table 10 years ago was told to be normal     Requested Prescriptions     Signed Prescriptions Disp Refills   • atenolol (TENORMIN) 25 MG tablet 90 tablet 3     Sig: Take 1 tablet by mouth Daily.      Orders Placed This Encounter   Procedures   • Compression Thigh High Stockings     Order Specific Question:   Stocking type     Answer:   Compression Knee High Stockings     Order Specific Question:   Length of Need (99 Months = Lifetime)     Answer:   99 Months = Lifetime      Discussed with her that she can start midodrine 2-3 times a day initially as well as salt tablets and in the next day or 2 start low-dose atenolol  Monitor blood pressures 2 or 3 times a day  Call if blood pressures running low  We will perform another virtual visit in approximately a month    Total time spent telephone visit 16 minutes including discussion of results, counseling regarding lifestyle changes and medications as well as review of records and chart completion      Return in about 4 weeks (around 12/19/2020).

## 2020-12-19 ENCOUNTER — OFFICE VISIT (OUTPATIENT)
Dept: CARDIOLOGY | Facility: CLINIC | Age: 50
End: 2020-12-19

## 2020-12-19 VITALS
DIASTOLIC BLOOD PRESSURE: 70 MMHG | SYSTOLIC BLOOD PRESSURE: 94 MMHG | HEIGHT: 66 IN | HEART RATE: 90 BPM | BODY MASS INDEX: 24.91 KG/M2 | WEIGHT: 155 LBS

## 2020-12-19 DIAGNOSIS — G90.A POTS (POSTURAL ORTHOSTATIC TACHYCARDIA SYNDROME): ICD-10-CM

## 2020-12-19 DIAGNOSIS — I47.1 PAROXYSMAL SVT (SUPRAVENTRICULAR TACHYCARDIA) (HCC): ICD-10-CM

## 2020-12-19 DIAGNOSIS — M79.7 FIBROMYALGIA: ICD-10-CM

## 2020-12-19 DIAGNOSIS — R00.2 PALPITATIONS: ICD-10-CM

## 2020-12-19 DIAGNOSIS — R42 DIZZY SPELLS: ICD-10-CM

## 2020-12-19 DIAGNOSIS — R06.02 SHORTNESS OF BREATH: Primary | ICD-10-CM

## 2020-12-19 PROCEDURE — 99214 OFFICE O/P EST MOD 30 MIN: CPT | Performed by: INTERNAL MEDICINE

## 2020-12-19 RX ORDER — ATENOLOL 25 MG/1
25 TABLET ORAL DAILY
Qty: 90 TABLET | Refills: 3 | Status: SHIPPED | OUTPATIENT
Start: 2020-12-19 | End: 2022-01-20

## 2020-12-19 RX ORDER — MIDODRINE HYDROCHLORIDE 2.5 MG/1
2.5 TABLET ORAL
Qty: 270 TABLET | Refills: 3 | Status: SHIPPED | OUTPATIENT
Start: 2020-12-19 | End: 2021-07-27 | Stop reason: SDUPTHER

## 2020-12-19 NOTE — PROGRESS NOTES
Mariann Portillo  [id number]  1970  50 y.o.  female    You have chosen to receive care through a telehealth visit.  Do you consent to use a video/audio connection for your medical care today? Yes    Routine virtual visit using above modality     Referring Provider: Caitlyn Reese MD    Reason for  Visit:      routine follow up   Initial visit for palpitations and shortness of breath     Subjective    Patient has faxed over blood pressure and heart rate recordings  Her blood pressure drops when she stands up  Her heart rate increases  She is tolerating midodrine 2.5 mg once or twice a day well  Takes atenolol 12.5 mg p.o. daily  Overall no significant change in her symptoms  Has dizzy spells with drop in blood pressure  Does not feel palpitations as much  No syncope  No new events or occurrences  Has increased sodium intake  Continues on other medications for fibromyalgia     History of present illness:  Mariann Portillo is a 50 y.o. yo female with history of fibromyalgia who presents today for   Chief Complaint   Patient presents with   • Follow-up   .    History  Past Medical History:   Diagnosis Date   • Allergic rhinitis    • Asthma    • Chronic UTI    • Endometriosis    • Fibromyalgia    • High cholesterol    • Hypertension    • IBS (irritable bowel syndrome)    • Meniere disease    • Migraines    ,   Past Surgical History:   Procedure Laterality Date   • CHOLECYSTECTOMY     • CYST REMOVAL     • ENDOMETRIAL BIOPSY     ,   Family History   Problem Relation Age of Onset   • Cancer Mother    • Breast cancer Mother 58   • Heart disease Father    ,   Social History     Tobacco Use   • Smoking status: Never Smoker   • Smokeless tobacco: Never Used   Substance Use Topics   • Alcohol use: No     Frequency: Never   • Drug use: Defer   ,     Medications  Current Outpatient Medications   Medication Sig Dispense Refill   • albuterol sulfate HFA (PROAIR HFA) 108 (90 Base) MCG/ACT inhaler Every 4 (Four) Hours.     •  atenolol (TENORMIN) 25 MG tablet Take 1 tablet by mouth Daily. 90 tablet 3   • imipramine (TOFRANIL) 25 MG tablet      • Melatonin 10 MG tablet Take  by mouth.     • metFORMIN (GLUCOPHAGE) 850 MG tablet 850 mg 3 (Three) Times a Day.     • midodrine (PROAMATINE) 2.5 MG tablet Take 1 tablet by mouth 3 (Three) Times a Day Before Meals. 270 tablet 3   • mometasone (ELOCON) 0.1 % cream      • nitrofurantoin (MACRODANTIN) 100 MG capsule Take 100 mg by mouth.     • ondansetron (ZOFRAN) 4 MG tablet Take 1 tablet by mouth Every 8 (Eight) Hours As Needed for Nausea or Vomiting. 18 tablet 1   • PB-Hyoscy-Atropine-Scopolamine (DONNATAL) 16.2 MG per tablet Take 1 tablet by mouth Every 6 (Six) Hours As Needed.     • phenazopyridine (PYRIDIUM) 100 MG tablet Take 100 mg by mouth.     • pimecrolimus (ELIDEL) 1 % cream TRINITY ON THE SKIN BID  1   • polyethylene glycol (MIRALAX) powder Take 17 g by mouth As Needed.     • therapeutic multivitamin-minerals (THERAGRAN-M) tablet Take 1 tablet by mouth.     • vitamin B-12 (CYANOCOBALAMIN) 1000 MCG tablet Take 1,000 mcg by mouth.     • vitamin D (ERGOCALCIFEROL) 86957 units capsule capsule Every 14 (Fourteen) Days.     • zolpidem CR (AMBIEN CR) 6.25 MG CR tablet        No current facility-administered medications for this visit.        Allergies:  Decongestant [pseudoephedrine], Codimal-a [brompheniramine], Levaquin [levofloxacin], and Percocet [oxycodone-acetaminophen]    Review of Systems  Review of Systems   Constitution: Negative.   HENT: Negative.    Eyes: Negative.    Cardiovascular: Positive for dyspnea on exertion, palpitations and syncope. Negative for chest pain, claudication, cyanosis, irregular heartbeat, leg swelling, near-syncope, orthopnea and paroxysmal nocturnal dyspnea.   Respiratory: Negative.    Endocrine: Negative.    Hematologic/Lymphatic: Negative.    Skin: Negative.    Gastrointestinal: Negative for anorexia.   Genitourinary: Negative.    Neurological: Positive for  "dizziness.   Psychiatric/Behavioral: Negative.        Objective     Physical Exam:  BP 94/70   Pulse 90   Ht 167.6 cm (66\")   Wt 70.3 kg (155 lb)   BMI 25.02 kg/m²   Self reported vitals above as available      • General demeanor : Patient is calm and composed.  There is no distress.  Patient can converse normally    • Description of patient's judgment and insight: Normal  • Brief assessment of mental status, which may include:  - Orientation to time, place, and person  - Recent and remote memory intact  - Mood and affect is normal  • Per patient no problems with vision or eyes that is new  • Per self examination of patient no issues with her ears or nose or gums or teeth  • Assessment of hearing: Normal  • self examination of patient no reported mass in her neck    • Patient can carry out her normal speech and does not have any respiratory difficulties  • Per self examination of patient no significant lower extremity edema  • Per self examination of patient no reported skin issues that is new  • Per self-examination gait is normal and unchanged from baseline   • Per self examination of patient digits and nails are normal without any cyanosis or swelling  • Per self examination of patient no issues with joints or bones that is new  • Assessment of range of motion with notation of any pain, crepitation or contracture      Results Review:    Results for orders placed during the hospital encounter of 11/02/20   Adult Transthoracic Echo Complete W/ Cont if Necessary Per Protocol    Narrative · Left ventricular ejection fraction appears to be 56 - 60%.  · Left ventricular diastolic function was normal.  · Abnormal global longitudinal LV strain (GLS) = -15.8%.  · No pulmonary hypertension        Mariann Portillo  Holter Monitor 72Hr-21Day (0296T/0298T)  Order# [id number]  Reading physician: Colton Arguelles MD Ordering physician: Colton Arguelles MD Study date: 10/7/20   Patient Information    Patient Name   Mariann Portillo MRN "   [id number] Sex   Female  (Age)   1970 (50 y.o.)   Interpretation Summary       · Average HR: 103. Min HR: 68. Max HR: 174.     · Entire report was reviewed.  Monitoring in days: ~14   · The predominant rhythm noted during the testing period was sinus rhythm.     · Rare supraventricular ectopics with an APC burden of: < 1%    · Rare premature ventricular contractions with a PVC burden of: < 1%     · No correlated arrhythmia  · No significant pauses                  Conclusion:      Baseline rhythm is sinus.  No significant ectopy   2 runs of supraventricular tachycardia longest 6 beats at a maximum rate of 174 bpm and an average of 162 bpm              Procedures    Assessment/Plan   Diagnoses and all orders for this visit:    1. Shortness of breath (Primary)    2. Dizzy spells  -     Ambulatory Referral to Cardiology  -     Compression Knee High Stockings    3. Palpitations  -     Ambulatory Referral to Cardiology    4. Paroxysmal SVT (supraventricular tachycardia) (CMS/[facility])    5. POTS (postural orthostatic tachycardia syndrome)  -     Ambulatory Referral to Cardiology  -     Compression Knee High Stockings    6. Fibromyalgia    Other orders  -     midodrine (PROAMATINE) 2.5 MG tablet; Take 1 tablet by mouth 3 (Three) Times a Day Before Meals.  Dispense: 270 tablet; Refill: 3  -     atenolol (TENORMIN) 25 MG tablet; Take 1 tablet by mouth Daily.  Dispense: 90 tablet; Refill: 3        Plan      Orders Placed This Encounter   Procedures   • Compression Knee High Stockings     Order Specific Question:   Stocking type     Answer:   Compression Knee High Stockings     Order Specific Question:   Length of Need (99 Months = Lifetime)     Answer:   99 Months = Lifetime   • Ambulatory Referral to Cardiology     Referral Priority:   Routine     Referral Type:   Consultation     Referral Reason:   Specialty Services Required     Requested Specialty:   Cardiology     Number of Visits Requested:   1        Will refer  to [facility]  Will escalate treatment with midodrine and see if she can increase her beta-blocker therapy  Continue monitoring orthostatic blood pressures that she is already doing  Option for 2 pairs of knee-high stockings sent to [facility]    Requested Prescriptions     Signed Prescriptions Disp Refills   • midodrine (PROAMATINE) 2.5 MG tablet 270 tablet 3     Sig: Take 1 tablet by mouth 3 (Three) Times a Day Before Meals.   • atenolol (TENORMIN) 25 MG tablet 90 tablet 3     Sig: Take 1 tablet by mouth Daily.      Total time spent in this video visit  was 26 minutes        Return in about 3 months (around 3/19/2021).

## 2020-12-29 LAB
ALBUMIN SERPL-MCNC: 4.5 G/DL (ref 3.5–5.2)
ALP BLD-CCNC: 95 U/L (ref 35–104)
ALT SERPL-CCNC: 11 U/L (ref 5–33)
ANION GAP SERPL CALCULATED.3IONS-SCNC: 13 MMOL/L (ref 7–19)
AST SERPL-CCNC: 16 U/L (ref 5–32)
BASOPHILS ABSOLUTE: 0.1 K/UL (ref 0–0.2)
BASOPHILS RELATIVE PERCENT: 1 % (ref 0–1)
BILIRUB SERPL-MCNC: 0.4 MG/DL (ref 0.2–1.2)
BUN BLDV-MCNC: 14 MG/DL (ref 6–20)
CALCIUM SERPL-MCNC: 9.9 MG/DL (ref 8.6–10)
CHLORIDE BLD-SCNC: 102 MMOL/L (ref 98–111)
CHOLESTEROL, TOTAL: 249 MG/DL (ref 160–199)
CO2: 26 MMOL/L (ref 22–29)
CREAT SERPL-MCNC: 0.6 MG/DL (ref 0.5–0.9)
EOSINOPHILS ABSOLUTE: 0.3 K/UL (ref 0–0.6)
EOSINOPHILS RELATIVE PERCENT: 2.8 % (ref 0–5)
GFR AFRICAN AMERICAN: >59
GFR NON-AFRICAN AMERICAN: >60
GLUCOSE BLD-MCNC: 96 MG/DL (ref 74–109)
HCT VFR BLD CALC: 42.1 % (ref 37–47)
HDLC SERPL-MCNC: 65 MG/DL (ref 65–121)
HEMOGLOBIN: 13.4 G/DL (ref 12–16)
IMMATURE GRANULOCYTES #: 0.1 K/UL
LDL CHOLESTEROL CALCULATED: 158 MG/DL
LYMPHOCYTES ABSOLUTE: 3.7 K/UL (ref 1.1–4.5)
LYMPHOCYTES RELATIVE PERCENT: 38 % (ref 20–40)
MCH RBC QN AUTO: 30.9 PG (ref 27–31)
MCHC RBC AUTO-ENTMCNC: 31.8 G/DL (ref 33–37)
MCV RBC AUTO: 97.2 FL (ref 81–99)
MONOCYTES ABSOLUTE: 0.6 K/UL (ref 0–0.9)
MONOCYTES RELATIVE PERCENT: 6.5 % (ref 0–10)
NEUTROPHILS ABSOLUTE: 4.9 K/UL (ref 1.5–7.5)
NEUTROPHILS RELATIVE PERCENT: 50.9 % (ref 50–65)
PDW BLD-RTO: 12.1 % (ref 11.5–14.5)
PLATELET # BLD: 441 K/UL (ref 130–400)
PMV BLD AUTO: 9.3 FL (ref 9.4–12.3)
POTASSIUM SERPL-SCNC: 4.8 MMOL/L (ref 3.5–5)
RBC # BLD: 4.33 M/UL (ref 4.2–5.4)
SEDIMENTATION RATE, ERYTHROCYTE: 18 MM/HR (ref 0–20)
SODIUM BLD-SCNC: 141 MMOL/L (ref 136–145)
T4 FREE: 1.04 NG/DL (ref 0.93–1.7)
TOTAL PROTEIN: 7.6 G/DL (ref 6.6–8.7)
TRIGL SERPL-MCNC: 131 MG/DL (ref 0–149)
TSH SERPL DL<=0.05 MIU/L-ACNC: 2.64 UIU/ML (ref 0.27–4.2)
VITAMIN D 25-HYDROXY: 49.5 NG/ML
WBC # BLD: 9.6 K/UL (ref 4.8–10.8)

## 2021-01-18 ENCOUNTER — HOSPITAL ENCOUNTER (OUTPATIENT)
Dept: WOMENS IMAGING | Age: 51
Discharge: HOME | End: 2021-01-18
Payer: COMMERCIAL

## 2021-01-18 DIAGNOSIS — Z12.31 BREAST CANCER SCREENING BY MAMMOGRAM: ICD-10-CM

## 2021-01-18 DIAGNOSIS — Z12.31 ENCOUNTER FOR SCREENING MAMMOGRAM FOR MALIGNANT NEOPLASM OF BREAST: ICD-10-CM

## 2021-01-18 PROCEDURE — 77063 BREAST TOMOSYNTHESIS BI: CPT

## 2021-06-02 LAB
ALBUMIN SERPL-MCNC: 4.7 G/DL (ref 3.5–5.2)
ALP BLD-CCNC: 96 U/L (ref 35–104)
ALT SERPL-CCNC: 10 U/L (ref 5–33)
ANION GAP SERPL CALCULATED.3IONS-SCNC: 10 MMOL/L (ref 7–19)
AST SERPL-CCNC: 16 U/L (ref 5–32)
BASOPHILS ABSOLUTE: 0.1 K/UL (ref 0–0.2)
BASOPHILS RELATIVE PERCENT: 1 % (ref 0–1)
BILIRUB SERPL-MCNC: <0.2 MG/DL (ref 0.2–1.2)
BUN BLDV-MCNC: 10 MG/DL (ref 6–20)
CALCIUM SERPL-MCNC: 9.9 MG/DL (ref 8.6–10)
CHLORIDE BLD-SCNC: 101 MMOL/L (ref 98–111)
CHOLESTEROL, TOTAL: 244 MG/DL (ref 160–199)
CO2: 29 MMOL/L (ref 22–29)
CREAT SERPL-MCNC: 0.6 MG/DL (ref 0.5–0.9)
EOSINOPHILS ABSOLUTE: 0.3 K/UL (ref 0–0.6)
EOSINOPHILS RELATIVE PERCENT: 3.2 % (ref 0–5)
GFR AFRICAN AMERICAN: >59
GFR NON-AFRICAN AMERICAN: >60
GLUCOSE BLD-MCNC: 96 MG/DL (ref 74–109)
HBA1C MFR BLD: 5.2 % (ref 4–6)
HCT VFR BLD CALC: 42.3 % (ref 37–47)
HDLC SERPL-MCNC: 63 MG/DL (ref 65–121)
HEMOGLOBIN: 13.8 G/DL (ref 12–16)
IMMATURE GRANULOCYTES #: 0 K/UL
LDL CHOLESTEROL CALCULATED: 147 MG/DL
LYMPHOCYTES ABSOLUTE: 3 K/UL (ref 1.1–4.5)
LYMPHOCYTES RELATIVE PERCENT: 32.1 % (ref 20–40)
MCH RBC QN AUTO: 31.2 PG (ref 27–31)
MCHC RBC AUTO-ENTMCNC: 32.6 G/DL (ref 33–37)
MCV RBC AUTO: 95.7 FL (ref 81–99)
MONOCYTES ABSOLUTE: 0.5 K/UL (ref 0–0.9)
MONOCYTES RELATIVE PERCENT: 5.7 % (ref 0–10)
NEUTROPHILS ABSOLUTE: 5.3 K/UL (ref 1.5–7.5)
NEUTROPHILS RELATIVE PERCENT: 57.7 % (ref 50–65)
PDW BLD-RTO: 11.9 % (ref 11.5–14.5)
PLATELET # BLD: 417 K/UL (ref 130–400)
PMV BLD AUTO: 9.5 FL (ref 9.4–12.3)
POTASSIUM SERPL-SCNC: 4.9 MMOL/L (ref 3.5–5)
RBC # BLD: 4.42 M/UL (ref 4.2–5.4)
SODIUM BLD-SCNC: 140 MMOL/L (ref 136–145)
T4 FREE: 0.98 NG/DL (ref 0.93–1.7)
TOTAL PROTEIN: 7.9 G/DL (ref 6.6–8.7)
TRIGL SERPL-MCNC: 170 MG/DL (ref 0–149)
TSH SERPL DL<=0.05 MIU/L-ACNC: 1.57 UIU/ML (ref 0.27–4.2)
VITAMIN B-12: 769 PG/ML (ref 211–946)
VITAMIN D 25-HYDROXY: 49.3 NG/ML
WBC # BLD: 9.2 K/UL (ref 4.8–10.8)

## 2021-06-03 ENCOUNTER — TELEPHONE (OUTPATIENT)
Dept: PULMONOLOGY | Facility: CLINIC | Age: 51
End: 2021-06-03

## 2021-06-03 NOTE — TELEPHONE ENCOUNTER
Dr. Reese's office left a voicemail that this patient was seen in their office yesterday and was complaining of increased shortness of breath. She wants our office to call the patient.     Left message for the patient to call back.

## 2021-06-07 NOTE — TELEPHONE ENCOUNTER
I spoke to the patient and she said she gets occasional shortness of breath. She said she would call back to make an appointment if it got worse.   She thought Dr. Reese was going to find out when her CT was supposed to be done. Relayed message to the patient that her last CT was in November 2020.

## 2021-06-18 ENCOUNTER — HOSPITAL ENCOUNTER (OUTPATIENT)
Dept: GENERAL RADIOLOGY | Age: 51
Discharge: HOME | End: 2021-06-18
Payer: COMMERCIAL

## 2021-06-18 DIAGNOSIS — M54.50 LOW BACK PAIN, UNSPECIFIED BACK PAIN LATERALITY, UNSPECIFIED CHRONICITY, UNSPECIFIED WHETHER SCIATICA PRESENT: ICD-10-CM

## 2021-06-18 DIAGNOSIS — M25.552 LEFT HIP PAIN: ICD-10-CM

## 2021-06-18 PROCEDURE — 72100 X-RAY EXAM L-S SPINE 2/3 VWS: CPT

## 2021-06-18 PROCEDURE — 73502 X-RAY EXAM HIP UNI 2-3 VIEWS: CPT

## 2021-07-27 ENCOUNTER — OFFICE VISIT (OUTPATIENT)
Dept: CARDIOLOGY | Facility: CLINIC | Age: 51
End: 2021-07-27

## 2021-07-27 VITALS
OXYGEN SATURATION: 97 % | BODY MASS INDEX: 25.55 KG/M2 | SYSTOLIC BLOOD PRESSURE: 122 MMHG | DIASTOLIC BLOOD PRESSURE: 70 MMHG | WEIGHT: 159 LBS | HEART RATE: 99 BPM | HEIGHT: 66 IN

## 2021-07-27 DIAGNOSIS — I47.1 PAROXYSMAL SVT (SUPRAVENTRICULAR TACHYCARDIA) (HCC): ICD-10-CM

## 2021-07-27 DIAGNOSIS — M79.7 FIBROMYALGIA: ICD-10-CM

## 2021-07-27 DIAGNOSIS — R06.02 SHORTNESS OF BREATH: Primary | ICD-10-CM

## 2021-07-27 DIAGNOSIS — R00.2 PALPITATIONS: ICD-10-CM

## 2021-07-27 DIAGNOSIS — R42 DIZZY SPELLS: ICD-10-CM

## 2021-07-27 PROCEDURE — 93000 ELECTROCARDIOGRAM COMPLETE: CPT | Performed by: INTERNAL MEDICINE

## 2021-07-27 PROCEDURE — 99214 OFFICE O/P EST MOD 30 MIN: CPT | Performed by: INTERNAL MEDICINE

## 2021-07-27 RX ORDER — MIDODRINE HYDROCHLORIDE 5 MG/1
5 TABLET ORAL
Qty: 270 TABLET | Refills: 3 | Status: SHIPPED | OUTPATIENT
Start: 2021-07-27 | End: 2022-06-08 | Stop reason: SDUPTHER

## 2021-07-27 RX ORDER — PYRIDOSTIGMINE BROMIDE 60 MG/1
TABLET ORAL 2 TIMES DAILY
COMMUNITY
Start: 2021-07-26 | End: 2022-06-08

## 2021-07-27 NOTE — PROGRESS NOTES
Mariann Portillo  [id number]  1970  50 y.o.  female         Referring Provider: Caitlyn Reese MD    Reason for  Visit:    routine follow up   Initial visit for palpitations and shortness of breath     Subjective    Overall feels the same   No new events or complaints since last visit   Overall the patient feels no major change from baseline symptoms   Similar symptoms as during last visit      BP at home runs low and reviewed   Her blood pressure drops when she stands up  Her heart rate increases    She is tolerating midodrine 2.5 mg once or twice a day well  Takes atenolol 12.5 mg p.o. daily    Overall no significant change in her symptoms  Has dizzy spells with drop in blood pressure    Does not feel palpitations to any large extent  No syncope  No new events or occurrences    Has increased sodium intake  Continues on other medications for fibromyalgia  Started on Mestinon by autonomic clinic     No bleeding, excessive bruising, gait instability or fall risks       History of present illness:  Mariann Portillo is a 50 y.o. yo female with history of fibromyalgia who presents today for   Chief Complaint   Patient presents with   • Rapid Heart Rate     6 mo fu    • Shortness of Breath     WITH EXERTION   • Dizziness   • Hyperlipidemia     ABNORMAL LIPID AND CBC  DONE 6/2/21   .    History  Past Medical History:   Diagnosis Date   • Allergic rhinitis    • Asthma    • Chronic UTI    • Endometriosis    • Fibromyalgia    • High cholesterol    • Hypertension    • IBS (irritable bowel syndrome)    • Meniere disease    • Migraines    ,   Past Surgical History:   Procedure Laterality Date   • CHOLECYSTECTOMY     • CYST REMOVAL     • ENDOMETRIAL BIOPSY     ,   Family History   Problem Relation Age of Onset   • Cancer Mother    • Breast cancer Mother 58   • Heart disease Father    ,   Social History     Tobacco Use   • Smoking status: Never Smoker   • Smokeless tobacco: Never Used   Substance Use Topics   • Alcohol use:  No   • Drug use: Defer   ,     Medications  Current Outpatient Medications   Medication Sig Dispense Refill   • albuterol sulfate HFA (PROAIR HFA) 108 (90 Base) MCG/ACT inhaler Every 4 (Four) Hours.     • atenolol (TENORMIN) 25 MG tablet Take 1 tablet by mouth Daily. 90 tablet 3   • imipramine (TOFRANIL) 25 MG tablet      • Melatonin 10 MG tablet Take  by mouth.     • metFORMIN (GLUCOPHAGE) 850 MG tablet 850 mg 3 (Three) Times a Day.     • midodrine (PROAMATINE) 5 MG tablet Take 1 tablet by mouth 3 (Three) Times a Day Before Meals. 270 tablet 3   • mometasone (ELOCON) 0.1 % cream      • nitrofurantoin (MACRODANTIN) 100 MG capsule Take 100 mg by mouth.     • PB-Hyoscy-Atropine-Scopolamine (DONNATAL) 16.2 MG per tablet Take 1 tablet by mouth Every 6 (Six) Hours As Needed.     • phenazopyridine (PYRIDIUM) 100 MG tablet Take 100 mg by mouth.     • pimecrolimus (ELIDEL) 1 % cream TRINITY ON THE SKIN BID  1   • polyethylene glycol (MIRALAX) powder Take 17 g by mouth As Needed.     • pyridostigmine (MESTINON) 60 MG tablet      • therapeutic multivitamin-minerals (THERAGRAN-M) tablet Take 1 tablet by mouth.     • vitamin B-12 (CYANOCOBALAMIN) 1000 MCG tablet Take 1,000 mcg by mouth.     • vitamin D (ERGOCALCIFEROL) 27081 units capsule capsule Every 14 (Fourteen) Days.     • zolpidem CR (AMBIEN CR) 6.25 MG CR tablet      • ondansetron (ZOFRAN) 4 MG tablet Take 1 tablet by mouth Every 8 (Eight) Hours As Needed for Nausea or Vomiting. 18 tablet 1     No current facility-administered medications for this visit.       Allergies:  Decongestant [pseudoephedrine], Codimal-a [brompheniramine], Levaquin [levofloxacin], and Percocet [oxycodone-acetaminophen]    Review of Systems  Review of Systems   Constitutional: Negative.   HENT: Negative.    Eyes: Negative.    Cardiovascular: Positive for dyspnea on exertion, palpitations and syncope. Negative for chest pain, claudication, cyanosis, irregular heartbeat, leg swelling, near-syncope,  "orthopnea and paroxysmal nocturnal dyspnea.   Respiratory: Negative.    Endocrine: Negative.    Hematologic/Lymphatic: Negative.    Skin: Negative.    Gastrointestinal: Negative for anorexia.   Genitourinary: Negative.    Neurological: Positive for dizziness.   Psychiatric/Behavioral: Negative.        Objective     Physical Exam:  /70 (BP Location: Left arm, Patient Position: Sitting, Cuff Size: Adult)   Pulse 99   Ht 167.6 cm (66\")   Wt 72.1 kg (159 lb)   SpO2 97%   BMI 25.66 kg/m²       Physical Exam  Constitutional:       Appearance: Normal appearance.   HENT:      Head: Normocephalic.   Eyes:      General: Lids are normal.   Neck:      Vascular: No carotid bruit.   Cardiovascular:      Rate and Rhythm: Regular rhythm.      Heart sounds: Normal heart sounds, S1 normal and S2 normal.    No systolic murmur is present.     Pulmonary:      Effort: Pulmonary effort is normal.   Abdominal:      Palpations: Abdomen is soft.   Neurological:      Mental Status: She is alert.   Psychiatric:         Speech: Speech normal.         Behavior: Behavior normal.         Thought Content: Thought content normal.          Results Review:    Results for orders placed during the hospital encounter of 20    Adult Transthoracic Echo Complete W/ Cont if Necessary Per Protocol    Interpretation Summary  · Left ventricular ejection fraction appears to be 56 - 60%.  · Left ventricular diastolic function was normal.  · Abnormal global longitudinal LV strain (GLS) = -15.8%.  · No pulmonary hypertension      Mariann Portillo  Holter Monitor 72Hr-21Day (0296T/0298T)  Order# [id number]  Reading physician: Colton Arguelles MD Ordering physician: Colton Arguelles MD Study date: 10/7/20   Patient Information    Patient Name   Mariann Portillo MRN   [id number] Legal Sex   Female  (Age)   1970 (50 y.o.)   Interpretation Summary       · Average HR: 103. Min HR: 68. Max HR: 174.     · Entire report was reviewed.  Monitoring in days: ~14 "   · The predominant rhythm noted during the testing period was sinus rhythm.     · Rare supraventricular ectopics with an APC burden of: < 1%    · Rare premature ventricular contractions with a PVC burden of: < 1%     · No correlated arrhythmia  · No significant pauses                  Conclusion:      Baseline rhythm is sinus.  No significant ectopy   2 runs of supraventricular tachycardia longest 6 beats at a maximum rate of 174 bpm and an average of 162 bpm           Mariann Portillo  Holter Monitor 72Hr-21Day (0296T/0298T)  Order# [id number]  Reading physician: Colton Arguelles MD Ordering physician: Colton Arguelles MD Study date: 10/7/20   Patient Information    Patient Name   Mariann Portillo MRN   [id number] Sex   Female  (Age)   1970 (50 y.o.)   Interpretation Summary       · Average HR: 103. Min HR: 68. Max HR: 174.     · Entire report was reviewed.  Monitoring in days: ~14   · The predominant rhythm noted during the testing period was sinus rhythm.     · Rare supraventricular ectopics with an APC burden of: < 1%    · Rare premature ventricular contractions with a PVC burden of: < 1%     · No correlated arrhythmia  · No significant pauses                  Conclusion:      Baseline rhythm is sinus.  No significant ectopy   2 runs of supraventricular tachycardia longest 6 beats at a maximum rate of 174 bpm and an average of 162 bpm                ECG 12 Lead    Date/Time: 2021 12:33 PM  Performed by: Colton Arguelles MD  Authorized by: Colton Arguelles MD   Comparison: compared with previous ECG from 10/7/2020  Similar to previous ECG  Rhythm: sinus rhythm  Rate: normal  ST Segments: ST segments normal  T Waves: T waves normal  QRS axis: normal  Other findings: left atrial abnormality    Clinical impression: abnormal EKG            Assessment/Plan   Diagnoses and all orders for this visit:    1. Shortness of breath (Primary)    2. Dizzy spells    3. Palpitations    4. Paroxysmal SVT (supraventricular tachycardia)  (CMS/[facility])    5. Fibromyalgia    Other orders  -     ECG 12 Lead  -     midodrine (PROAMATINE) 5 MG tablet; Take 1 tablet by mouth 3 (Three) Times a Day Before Meals.  Dispense: 270 tablet; Refill: 3        Plan       Patient expressed understanding  Encouraged and answered all questions   Discussed with the patient and all questioned fully answered. She will call me if any problems arise.     Discussed results of prior testing with patient : echo   as well electrocardiogram from today    Reviewed and summarized recent test results     Keep f/u pulmonary follow up     Will refer to [facility] autonomic clinic  Will escalate treatment with midodrine and see if she can increase her beta-blocker therapy  Requested Prescriptions     Signed Prescriptions Disp Refills   • midodrine (PROAMATINE) 5 MG tablet 270 tablet 3     Sig: Take 1 tablet by mouth 3 (Three) Times a Day Before Meals.       Continue monitoring orthostatic blood pressures that she is already doing  Option for 2 pairs of knee-high stockings sent to [place] last visit and she has them     I support the patient's decision to take the Covid -19 vaccine   Had both dose   No major issues   Now fully immunized      Follow up with any mid level provider working with me to address interim issues                  Return in about 4 months (around 11/27/2021).

## 2021-08-07 ENCOUNTER — OFFICE VISIT (OUTPATIENT)
Dept: URGENT CARE | Age: 51
End: 2021-08-07
Payer: COMMERCIAL

## 2021-08-07 ENCOUNTER — HOSPITAL ENCOUNTER (OUTPATIENT)
Dept: GENERAL RADIOLOGY | Age: 51
Discharge: HOME | End: 2021-08-07
Payer: COMMERCIAL

## 2021-08-07 VITALS
TEMPERATURE: 98.7 F | WEIGHT: 154 LBS | DIASTOLIC BLOOD PRESSURE: 64 MMHG | SYSTOLIC BLOOD PRESSURE: 92 MMHG | HEIGHT: 66 IN | HEART RATE: 81 BPM | RESPIRATION RATE: 18 BRPM | OXYGEN SATURATION: 98 % | BODY MASS INDEX: 24.75 KG/M2

## 2021-08-07 DIAGNOSIS — S99.912A INJURY OF LEFT ANKLE, INITIAL ENCOUNTER: ICD-10-CM

## 2021-08-07 DIAGNOSIS — M25.572 ACUTE LEFT ANKLE PAIN: ICD-10-CM

## 2021-08-07 DIAGNOSIS — S99.912A INJURY OF LEFT ANKLE, INITIAL ENCOUNTER: Primary | ICD-10-CM

## 2021-08-07 PROCEDURE — 99213 OFFICE O/P EST LOW 20 MIN: CPT | Performed by: NURSE PRACTITIONER

## 2021-08-07 PROCEDURE — 73610 X-RAY EXAM OF ANKLE: CPT

## 2021-08-07 RX ORDER — ATENOLOL 25 MG/1
25 TABLET ORAL DAILY
COMMUNITY
Start: 2020-12-19

## 2021-08-07 RX ORDER — MIDODRINE HYDROCHLORIDE 5 MG/1
5 TABLET ORAL
COMMUNITY
Start: 2021-07-27

## 2021-08-07 RX ORDER — PYRIDOSTIGMINE BROMIDE 60 MG/1
TABLET ORAL
COMMUNITY
Start: 2021-07-26

## 2021-08-07 ASSESSMENT — VISUAL ACUITY: OU: 1

## 2021-08-07 NOTE — PROGRESS NOTES
[facility]  [address] [place] [address] [address] [address]  Dept: [phone]  Dept Fax: [phone]  Loc: [phone]    Georgian Kussmaul is a 52 y.o. female who presents today for her medical conditions/complaintsas noted below. Georgian Kussmaul is c/o of Ankle Pain (left)        HPI:     Ankle Pain   The incident occurred less than 1 hour ago. The incident occurred in the street. The injury mechanism was a twisting injury. The pain is present in the left ankle. The quality of the pain is described as shooting. The pain is at a severity of 7/10. The pain is moderate. The pain has been constant since onset. Associated symptoms include an inability to bear weight. She reports no foreign bodies present. The symptoms are aggravated by weight bearing. She has tried nothing for the symptoms. The treatment provided no relief. She stepped off the curb down town and missed the curb and rolled her ankle. She came straight here as she can not bear weight on her left ankle.   Past Medical History:   Diagnosis Date    Asthma     Cervical disc displacement     para-cervical disc protrusion c5-c6    Chronic UTI (urinary tract infection)     Eczema     Elevated white blood cell count     Endometriosis     Fibromyalgia     Hyperlipidemia     Hypertriglyceridemia     IBS (irritable bowel syndrome)     Meniere disease     Migraine     Rapid heart beat     Sciatica      Past Surgical History:   Procedure Laterality Date    CHOLECYSTECTOMY      ENDOMETRIAL ABLATION      OTHER SURGICAL HISTORY      cyst near tailbone removed       Family History   Problem Relation Age of Onset    Breast Cancer Mother 61    Heart Disease Father     Arthritis Other        Social History     Tobacco Use    Smoking status: Never Smoker    Smokeless tobacco: Never Used   Substance Use Topics    Alcohol use: No      Current Outpatient Medications   Medication Sig Dispense Refill    atenolol (TENORMIN) 25 MG tablet Take 25 mg by mouth daily      pyridostigmine (MESTINON) 60 MG tablet       midodrine (PROAMATINE) 5 MG tablet Take 5 mg by mouth 3 times daily (before meals)      vitamin D (CHOLECALCIFEROL) 1000 UNIT TABS tablet Take 1,000 Units by mouth daily      Multiple Vitamins-Minerals (THERAPEUTIC MULTIVITAMIN-MINERALS) tablet Take 1 tablet by mouth daily      zolpidem (AMBIEN) 10 MG tablet Take 10 mg by mouth nightly as needed.  imipramine (TOFRANIL) 25 MG tablet Take 25 mg by mouth nightly.  vitamin B-12 (CYANOCOBALAMIN) 1000 MCG tablet Take 1,000 mcg by mouth daily.  metformin (GLUCOPHAGE) 500 MG tablet Take 500 mg by mouth 2 times daily (with meals).  ALBUTEROL IN Inhale  into the lungs. No current facility-administered medications for this visit. Allergies   Allergen Reactions    Pseudoephedrine Shortness Of Breath and Swelling     Per patient she is allergic to Actifed and anything like that.  Actifed [Triprolidine-Pse]     Codimal Dh     Levaquin [Levofloxacin In D5w] Itching    Levofloxacin Itching     Per patient    Oxycodone-Acetaminophen     Percocet [Oxycodone-Acetaminophen]        Health Maintenance   Topic Date Due    Hepatitis C screen  Never done    COVID-19 Vaccine (1) Never done    HIV screen  Never done    DTaP/Tdap/Td vaccine (1 - Tdap) Never done    Colon cancer screen colonoscopy  Never done    Cervical cancer screen  01/16/2021    Flu vaccine (1) 09/01/2021    Breast cancer screen  01/18/2022    Lipid screen  06/02/2026    Hepatitis A vaccine  Aged Out    Hepatitis B vaccine  Aged Out    Hib vaccine  Aged Out    Meningococcal (ACWY) vaccine  Aged Out    Pneumococcal 0-64 years Vaccine  Aged Out       Subjective:     Review of Systems   Constitutional: Negative. Musculoskeletal: Positive for arthralgias, joint swelling and myalgias. Left ankle pain   Skin: Negative. Hematological: Negative. :Objective      Physical Exam  Vitals and nursing note reviewed. Constitutional:       General: She is awake. She is not in acute distress. Appearance: Normal appearance. She is well-developed, well-groomed and normal weight. She is not ill-appearing. HENT:      Head: Normocephalic. Right Ear: Hearing normal.      Left Ear: Hearing normal.      Nose: Nose normal.      Mouth/Throat:      Lips: Pink. Eyes:      General: Vision grossly intact. Neck:      Trachea: Phonation normal.   Cardiovascular:      Rate and Rhythm: Normal rate and regular rhythm. Pulmonary:      Effort: Pulmonary effort is normal. No respiratory distress. Musculoskeletal:         General: No deformity. Left ankle: Swelling present. No ecchymosis. Tenderness present. Decreased range of motion. Feet:       Comments: Pt unable to bear weight   Skin:     General: Skin is warm and dry. Capillary Refill: Capillary refill takes less than 2 seconds. Neurological:      General: No focal deficit present. Mental Status: She is alert, oriented to person, place, and time and easily aroused. Psychiatric:         Attention and Perception: Attention normal.         Mood and Affect: Mood normal.         Speech: Speech normal.         Behavior: Behavior normal. Behavior is cooperative. BP 92/64   Pulse 81   Temp 98.7 °F (37.1 °C)   Resp 18   Ht 5' 6\" (1.676 m)   Wt 154 lb (69.9 kg)   SpO2 98%   BMI 24.86 kg/m²     :Assessment       Diagnosis Orders   1. Injury of left ankle, initial encounter  XR ANKLE LEFT (MIN 3 VIEWS)   2. Acute left ankle pain         :Plan      Orders Placed This Encounter   Procedures    XR ANKLE LEFT (MIN 3 VIEWS)     Standing Status:   Future     Number of Occurrences:   1     Standing Expiration Date:   8/7/2022     Order Specific Question:   Reason for exam:     Answer:   left ankle pain, injury     HISTORY: Left ankle pain/injury.   FINDINGS: Three-view exam of the left ankle demonstrates no fracture  or dislocation. The ankle mortise is well-preserved. No localized soft  tissue swelling is demonstrated. IMPRESSION:  . No evidence of fracture. Signed by Dr Hoang Montenegro    No follow-ups on file. No orders of the defined types were placed in this encounter. Patient Instructions   Rest  Ice to left ankle 15-20 minutes 3-4 times tonight  Stay off of ankle as much as possible  Elevate left ankle at home  OTC Tylenol or Motrin as needed for pain  We will call x-ray results tomorrow but you can view this on my chart once it is available  Follow up with PCP or return to Urgent Care for worsening or unresolved symptoms. Patient given educational materials- see patient instructions. Discussed use, benefit, and side effects of prescribedmedications. All patient questions answered. Pt voiced understanding.        Electronically signed by SHIRA Ruiz CNP on 8/7/2021 at 5:47 PM

## 2021-08-07 NOTE — PATIENT INSTRUCTIONS
Rest  Ice to left ankle 15-20 minutes 3-4 times tonight  Stay off of ankle as much as possible  Elevate left ankle at home  OTC Tylenol or Motrin as needed for pain  We will call x-ray results tomorrow but you can view this on my chart once it is available  Follow up with PCP or return to Urgent Care for worsening or unresolved symptoms.

## 2021-08-08 ENCOUNTER — TELEPHONE (OUTPATIENT)
Dept: URGENT CARE | Age: 51
End: 2021-08-08

## 2021-10-29 ENCOUNTER — TELEPHONE (OUTPATIENT)
Dept: PULMONOLOGY | Facility: CLINIC | Age: 51
End: 2021-10-29

## 2021-11-02 ENCOUNTER — TELEPHONE (OUTPATIENT)
Dept: PULMONOLOGY | Facility: CLINIC | Age: 51
End: 2021-11-02

## 2021-11-02 NOTE — TELEPHONE ENCOUNTER
Left message for patient to call me back to confirm her appointment at [facility] on 11/12/21 at 9:00 am for a CT Chest WO Contrast.

## 2021-11-12 ENCOUNTER — HOSPITAL ENCOUNTER (OUTPATIENT)
Dept: CT IMAGING | Age: 51
Discharge: HOME | End: 2021-11-12
Payer: COMMERCIAL

## 2021-11-12 DIAGNOSIS — R91.8 MULTIPLE LUNG NODULES: ICD-10-CM

## 2021-11-12 PROCEDURE — 71250 CT THORAX DX C-: CPT

## 2021-11-15 ENCOUNTER — LAB (OUTPATIENT)
Dept: LAB | Facility: HOSPITAL | Age: 51
End: 2021-11-15

## 2021-11-15 DIAGNOSIS — Z01.812 ENCOUNTER FOR PREOPERATIVE SCREENING LABORATORY TESTING FOR COVID-19 VIRUS: ICD-10-CM

## 2021-11-15 DIAGNOSIS — R91.8 MULTIPLE LUNG NODULES: ICD-10-CM

## 2021-11-15 DIAGNOSIS — Z20.822 ENCOUNTER FOR PREOPERATIVE SCREENING LABORATORY TESTING FOR COVID-19 VIRUS: ICD-10-CM

## 2021-11-15 LAB — SARS-COV-2 ORF1AB RESP QL NAA+PROBE: NOT DETECTED

## 2021-11-15 PROCEDURE — C9803 HOPD COVID-19 SPEC COLLECT: HCPCS

## 2021-11-15 PROCEDURE — U0004 COV-19 TEST NON-CDC HGH THRU: HCPCS

## 2021-11-18 ENCOUNTER — OFFICE VISIT (OUTPATIENT)
Dept: PULMONOLOGY | Facility: CLINIC | Age: 51
End: 2021-11-18

## 2021-11-18 ENCOUNTER — PROCEDURE VISIT (OUTPATIENT)
Dept: PULMONOLOGY | Facility: CLINIC | Age: 51
End: 2021-11-18

## 2021-11-18 VITALS
BODY MASS INDEX: 26.49 KG/M2 | HEIGHT: 65 IN | HEART RATE: 78 BPM | DIASTOLIC BLOOD PRESSURE: 80 MMHG | WEIGHT: 159 LBS | SYSTOLIC BLOOD PRESSURE: 122 MMHG | OXYGEN SATURATION: 99 %

## 2021-11-18 DIAGNOSIS — R06.02 SHORTNESS OF BREATH: ICD-10-CM

## 2021-11-18 DIAGNOSIS — Z01.812 ENCOUNTER FOR PREOPERATIVE SCREENING LABORATORY TESTING FOR COVID-19 VIRUS: Primary | ICD-10-CM

## 2021-11-18 DIAGNOSIS — R91.8 MULTIPLE LUNG NODULES: ICD-10-CM

## 2021-11-18 DIAGNOSIS — R06.02 SHORTNESS OF BREATH: Primary | ICD-10-CM

## 2021-11-18 DIAGNOSIS — Z20.822 ENCOUNTER FOR PREOPERATIVE SCREENING LABORATORY TESTING FOR COVID-19 VIRUS: Primary | ICD-10-CM

## 2021-11-18 PROCEDURE — 99213 OFFICE O/P EST LOW 20 MIN: CPT | Performed by: INTERNAL MEDICINE

## 2021-11-18 PROCEDURE — 94729 DIFFUSING CAPACITY: CPT | Performed by: INTERNAL MEDICINE

## 2021-11-18 PROCEDURE — 94010 BREATHING CAPACITY TEST: CPT | Performed by: INTERNAL MEDICINE

## 2021-11-18 NOTE — PROCEDURES
Pulmonary Function Test  Performed by: Alexandra Heredia, RRT  Authorized by: Khanh Mendoza MD      Pre Drug % Predicted    FVC: 95%   FEV1: 93%   FEF 25-75%: 97%   FEV1/FVC: 80.68%   DLCO: 85%   D/VAsb: 97%    Interpretation   Spirometry   Spirometry shows normal results.   Review of FVL curve   Patient's effort is normal.   Diffusion Capacity  The patient's diffusion capacity is normal.  Diffusion capacity is normal when corrected for alveolar volume.

## 2021-11-18 NOTE — PROGRESS NOTES
"Background:  Pt with dyspnea and multiple lung nodules identified 9/2018   Chief Complaint  Lung Nodule    Subjective    History of Present Illness       Mariann Portillo presents to [facility].  She has had a little more of a cough this year.  She has a cocaktoo.  She has had a little worse asthma this year, feels she does not use it as much as she needs, uses it about once a week.  Sometimes up to 3 times.       Objective     Vital Signs:   /80   Pulse 78   Ht 165.1 cm (65\")   Wt 72.1 kg (159 lb)   SpO2 99% Comment: RA  BMI 26.46 kg/m²   Physical Exam  Constitutional:       General: She is not in acute distress.     Appearance: She is well-developed. She is not ill-appearing or toxic-appearing.   HENT:      Head: Atraumatic.   Eyes:      General: No scleral icterus.     Conjunctiva/sclera: Conjunctivae normal.   Cardiovascular:      Rate and Rhythm: Normal rate and regular rhythm.      Heart sounds: S1 normal and S2 normal.   Pulmonary:      Effort: Pulmonary effort is normal.      Breath sounds: Normal breath sounds.   Abdominal:      General: There is no distension.   Musculoskeletal:         General: No deformity.      Cervical back: Neck supple.   Skin:     Coloration: Skin is not pale.      Findings: No rash.   Neurological:      Mental Status: She is alert.        Result Review  Data Reviewed:{ Labs  Result Review  Imaging  Media :23}     CT Chest Without Contrast (11/12/2021 00:00) (Nena)  Examination. CT CHEST WO CONTRAST 11/12/2021 4:07 PM   History: History of multiple lung nodules.   DLP: 183 mGycm.   The CT scan of the chest are performed without intravenous contrast   enhancement. The images are acquired in axial plane and subsequent   reconstruction in coronal and sagittal plane.   The comparison is made with the previous study dated 11/16/2020.   An ill-defined, irregular shaped nodular infiltrate in the right upper   lobe laterally, " image #31 in axial plane, is stable and unchanged. It   probably represent a chronic process.   An interstitial infiltrate at the right base close to the right   diaphragm, image #87 axial plane, surrounding the moderately dilated   bronchioles is unchanged. This represent a chronic process.   Fibronodular changes in the lung apices, left more than the right are   unchanged and represent a chronic pleural parenchymal inflammatory   process. Loosely scattered nodules/nodular infiltrate in the left   upper lung laterally, image #28 through 36 is unchanged and represent   a chronic process.   No discrete dominant noncalcified nodule is identified.   The limited visualized soft tissues of the neck are unremarkable. The   thyroid gland isn't completely evaluated due to lack of contrast   enhancement. No discrete mass or lymphadenopathy.   No axillary lymphadenopathy.   The nonenlarged mediastinal/paratracheal lymph nodes are similar to   the previous study.   Atheromatous changes thoracic aorta are noted. No aneurysmal   dilatation. Mild atheromatous changes of coronary arteries are similar   to the previous study.   The limited visualized unenhanced liver and spleen are unremarkable.   The limited visualized pancreas and kidneys are unremarkable. The   gallbladder is surgically absent. The adrenal glands are normal.   The images reviewed in bone window show no acute bony abnormality or   bony lesion.   IMPRESSION:   The scattered areas of nodular infiltrate is stable and   represent chronic inflammatory process. No discrete lung nodules with   features of lung malignancy is noted. No further follow-up for these   nodules is warranted.   Signed by Dr Gloria Danielle  Exam End: 11/12/21  4:08 PM       PFT Values        Some values may be hidden. Unless noted otherwise, only the newest values recorded on each date are displayed.         Old Values PFT Results 2/17/20 11/18/21   No data to display.      Pre Drug PFT Results  2/17/20 11/18/21   FVC 93 95   FEV1 90 93   FEF 25-75% 85 97   FEV1/FVC 79.44 80.68      Post Drug PFT Results 2/17/20 11/18/21   No data to display.      Other Tests PFT Results 2/17/20 11/18/21   TLC 79    RV 64    DLCO 70 85   D/VAsb 84 97                      Assessment and Plan  {CC Problem List  Visit Diagnosis  ROS  Review (Popup)  Health Maintenance  Quality  BestPractice  Medications  SmartSets  SnapShot Encounters  Media :23}   Problem List Items Addressed This Visit        Pulmonary Problems    Shortness of breath    Relevant Orders    Pulmonary Function Test (Completed)      Other Visit Diagnoses     Encounter for preoperative screening laboratory testing for COVID-19 virus    -  Primary    Relevant Orders    COVID PRE-OP / PRE-PROCEDURE SCREENING ORDER (NO ISOLATION) - Swab, Nasal Cavity (Completed)    Multiple lung nodules          since she has a bird, we will monitor closely; pattern not currently typical for HP.  Will continue albuterol as needed, add additional controller if she gets any worse.  Currently at upper limits of mild intermittent asthma symptoms    Follow Up {Instructions Charge Capture  Follow-up Communications :23}   Return in about 6 months (around 5/18/2022) for spirometry.  Patient was given instructions and counseling regarding her condition or for health maintenance advice. Please see specific information pulled into the AVS if appropriate.    Electronically signed by Khanh Mendoza MD, 11/18/2021, 16:40 CST

## 2021-11-30 PROBLEM — G90.A POTS (POSTURAL ORTHOSTATIC TACHYCARDIA SYNDROME): Status: ACTIVE | Noted: 2021-11-30

## 2021-11-30 PROBLEM — I10 PRIMARY HYPERTENSION: Status: ACTIVE | Noted: 2021-11-30

## 2021-12-08 ENCOUNTER — OFFICE VISIT (OUTPATIENT)
Dept: CARDIOLOGY | Facility: CLINIC | Age: 51
End: 2021-12-08

## 2021-12-08 VITALS
HEART RATE: 68 BPM | OXYGEN SATURATION: 98 % | WEIGHT: 161 LBS | HEIGHT: 65 IN | SYSTOLIC BLOOD PRESSURE: 130 MMHG | DIASTOLIC BLOOD PRESSURE: 82 MMHG | BODY MASS INDEX: 26.82 KG/M2

## 2021-12-08 DIAGNOSIS — R06.02 SHORTNESS OF BREATH: ICD-10-CM

## 2021-12-08 DIAGNOSIS — R00.2 PALPITATIONS: ICD-10-CM

## 2021-12-08 DIAGNOSIS — I47.1 PAROXYSMAL SVT (SUPRAVENTRICULAR TACHYCARDIA) (HCC): Primary | ICD-10-CM

## 2021-12-08 DIAGNOSIS — G90.A POTS (POSTURAL ORTHOSTATIC TACHYCARDIA SYNDROME): ICD-10-CM

## 2021-12-08 DIAGNOSIS — I10 PRIMARY HYPERTENSION: ICD-10-CM

## 2021-12-08 DIAGNOSIS — R42 DIZZY SPELLS: ICD-10-CM

## 2021-12-08 DIAGNOSIS — M79.7 FIBROMYALGIA: ICD-10-CM

## 2021-12-08 PROCEDURE — 99214 OFFICE O/P EST MOD 30 MIN: CPT | Performed by: INTERNAL MEDICINE

## 2021-12-08 RX ORDER — CRISABOROLE 20 MG/G
OINTMENT TOPICAL
COMMUNITY
Start: 2021-11-17

## 2021-12-08 NOTE — PROGRESS NOTES
Mariann Portillo  [id number]  1970  51 y.o.  female         Referring Provider: Caitlyn Reese MD    Reason for  Visit:    routine follow up   Initial visit for palpitations and shortness of breath     Subjective    No new events or complaints since last visit   BP well controlled at home in general some run low   Reviewed home BP recordings     She is tolerating midodrine well 5 mg up to 3/day  Takes atenolol 12.5 mg p.o. daily    Overall no significant change in her symptoms  Has dizzy spells with drop in blood pressure    Does not feel palpitations to any large extent  No syncope  No new events or occurrences    Has increased sodium intake  Continues on other medications for fibromyalgia  Started on Mestinon by autonomic clinic and tolerating well    No bleeding, excessive bruising, gait instability or fall risks       History of present illness:  Mariann Portillo is a 51 y.o. yo female with fibromyalgia who presents today for   Chief Complaint   Patient presents with   • Shortness of Breath     4mo- no change in issues since LOV.    .    History  Past Medical History:   Diagnosis Date   • Allergic rhinitis    • Asthma    • Chronic UTI    • Endometriosis    • Fibromyalgia    • High cholesterol    • Hypertension    • IBS (irritable bowel syndrome)    • Meniere disease    • Migraines    ,   Past Surgical History:   Procedure Laterality Date   • CHOLECYSTECTOMY     • CYST REMOVAL     • ENDOMETRIAL BIOPSY     ,   Family History   Problem Relation Age of Onset   • Cancer Mother    • Breast cancer Mother 58   • Heart disease Father    ,   Social History     Tobacco Use   • Smoking status: Never Smoker   • Smokeless tobacco: Never Used   Vaping Use   • Vaping Use: Never used   Substance Use Topics   • Alcohol use: No   • Drug use: Defer   ,     Medications  Current Outpatient Medications   Medication Sig Dispense Refill   • albuterol sulfate HFA (PROAIR HFA) 108 (90 Base) MCG/ACT inhaler Every 4 (Four) Hours.      • atenolol (TENORMIN) 25 MG tablet Take 1 tablet by mouth Daily. 90 tablet 3   • Eucrisa 2 % ointment APPLY ON THE SKIN TWICE DAILY     • imipramine (TOFRANIL) 25 MG tablet      • Melatonin 10 MG tablet Take  by mouth.     • metFORMIN (GLUCOPHAGE) 850 MG tablet 850 mg 3 (Three) Times a Day.     • midodrine (PROAMATINE) 5 MG tablet Take 1 tablet by mouth 3 (Three) Times a Day Before Meals. 270 tablet 3   • mometasone (ELOCON) 0.1 % cream      • nitrofurantoin (MACRODANTIN) 100 MG capsule Take 100 mg by mouth.     • ondansetron (ZOFRAN) 4 MG tablet Take 1 tablet by mouth Every 8 (Eight) Hours As Needed for Nausea or Vomiting. 18 tablet 1   • PB-Hyoscy-Atropine-Scopolamine (DONNATAL) 16.2 MG per tablet Take 1 tablet by mouth Every 6 (Six) Hours As Needed.     • phenazopyridine (PYRIDIUM) 100 MG tablet Take 100 mg by mouth.     • pimecrolimus (ELIDEL) 1 % cream TRINITY ON THE SKIN BID  1   • polyethylene glycol (MIRALAX) powder Take 17 g by mouth As Needed.     • pyridostigmine (MESTINON) 60 MG tablet 2 (Two) Times a Day.     • therapeutic multivitamin-minerals (THERAGRAN-M) tablet Take 1 tablet by mouth.     • vitamin B-12 (CYANOCOBALAMIN) 1000 MCG tablet Take 1,000 mcg by mouth.     • vitamin D (ERGOCALCIFEROL) 07840 units capsule capsule Every 14 (Fourteen) Days.     • zolpidem CR (AMBIEN CR) 6.25 MG CR tablet        No current facility-administered medications for this visit.       Allergies:  Decongestant [pseudoephedrine], Codimal-a [brompheniramine], Levaquin [levofloxacin], and Percocet [oxycodone-acetaminophen]    Review of Systems  Review of Systems   Constitutional: Negative.   HENT: Negative.    Eyes: Negative.    Cardiovascular: Positive for dyspnea on exertion, palpitations and syncope. Negative for chest pain, claudication, cyanosis, irregular heartbeat, leg swelling, near-syncope, orthopnea and paroxysmal nocturnal dyspnea.   Respiratory: Negative.    Endocrine: Negative.    Hematologic/Lymphatic:  "Negative.    Skin: Negative.    Gastrointestinal: Negative for anorexia.   Genitourinary: Negative.    Neurological: Positive for dizziness.   Psychiatric/Behavioral: Negative.        Objective     Physical Exam:  /82   Pulse 68   Ht 165.1 cm (65\")   Wt 73 kg (161 lb)   SpO2 98%   BMI 26.79 kg/m²       Physical Exam  Constitutional:       Appearance: Normal appearance. She is well-developed.   HENT:      Head: Normocephalic.   Eyes:      General: Lids are normal.   Neck:      Vascular: Normal carotid pulses. No carotid bruit or JVD.      Trachea: No tracheal tenderness or tracheal deviation.   Cardiovascular:      Rate and Rhythm: Regular rhythm.      Pulses: Normal pulses.      Heart sounds: Normal heart sounds, S1 normal and S2 normal.    No systolic murmur is present.      Pulmonary:      Effort: Pulmonary effort is normal.      Breath sounds: No stridor.   Abdominal:      General: There is no distension.      Palpations: Abdomen is soft.      Tenderness: There is no abdominal tenderness.   Musculoskeletal:      Cervical back: No edema.   Skin:     General: Skin is warm.   Neurological:      Mental Status: She is alert.      Cranial Nerves: No cranial nerve deficit.      Sensory: No sensory deficit.   Psychiatric:         Speech: Speech normal.         Behavior: Behavior normal.         Thought Content: Thought content normal.          Results Review:    Results for orders placed during the hospital encounter of 11/02/20    Adult Transthoracic Echo Complete W/ Cont if Necessary Per Protocol    Interpretation Summary  · Left ventricular ejection fraction appears to be 56 - 60%.  · Left ventricular diastolic function was normal.  · Abnormal global longitudinal LV strain (GLS) = -15.8%.  · No pulmonary hypertension      Mariann DU [employer]  Holter Monitor 72Hr-21Day (0296T/0298T)  Order# [id number]  Reading physician: Colton Arguelles MD Ordering physician: Colton Arguelles MD Study date: 10/7/20   Patient " Information    Patient Name   Mariann Portillo MRN   [id number] Legal Sex   Female  (Age)   1970 (50 y.o.)   Interpretation Summary       · Average HR: 103. Min HR: 68. Max HR: 174.     · Entire report was reviewed.  Monitoring in days: ~14   · The predominant rhythm noted during the testing period was sinus rhythm.     · Rare supraventricular ectopics with an APC burden of: < 1%    · Rare premature ventricular contractions with a PVC burden of: < 1%     · No correlated arrhythmia  · No significant pauses                  Conclusion:      Baseline rhythm is sinus.  No significant ectopy   2 runs of supraventricular tachycardia longest 6 beats at a maximum rate of 174 bpm and an average of 162 bpm           Mariann Portillo  Holter Monitor 72Hr-21Day (0296T/0298T)  Order# [id number]  Reading physician: Colton Arguelles MD Ordering physician: Colton Arguelles MD Study date: 10/7/20   Patient Information    Patient Name   Mariann Portillo MRN   [id number] Sex   Female  (Age)   1970 (50 y.o.)   Interpretation Summary       · Average HR: 103. Min HR: 68. Max HR: 174.     · Entire report was reviewed.  Monitoring in days: ~14   · The predominant rhythm noted during the testing period was sinus rhythm.     · Rare supraventricular ectopics with an APC burden of: < 1%    · Rare premature ventricular contractions with a PVC burden of: < 1%     · No correlated arrhythmia  · No significant pauses                  Conclusion:      Baseline rhythm is sinus.  No significant ectopy   2 runs of supraventricular tachycardia longest 6 beats at a maximum rate of 174 bpm and an average of 162 bpm              Procedures    Assessment/Plan   Diagnoses and all orders for this visit:    1. Paroxysmal SVT (supraventricular tachycardia) (HCC) (Primary)    2. POTS (postural orthostatic tachycardia syndrome)    3. Primary hypertension    4. Shortness of breath    5. Fibromyalgia    6. Palpitations    7. Dizzy spells        Plan    Overall  better   The current medical regimen is effective;  continue present plan and medications.   Overall doing well no new cardiovascular symptoms and therefore no additional cardiac testing is required   If any interim issues arise will call me for further evaluation.     Can take Allegra 90 mg BID and see if it help   Regular sleep and stay well hydrated   Elevate head end of bed 6 inches     Keep f/u pulmonary follow up     Keep f/u [facility] clinic  Se will call      Continue monitoring orthostatic blood pressures that she is already doing  Option for 2 pairs of knee-high stockings sent to [employer] last visit and she has them     I support the patient's decision to take the Covid -19 vaccine   Had required complete course   No major issues   Now fully immunized    Had booster too       Follow up with TASNEEM Carrasco  or myself       Return in about 6 months (around 6/8/2022).

## 2021-12-13 ENCOUNTER — HOSPITAL ENCOUNTER (EMERGENCY)
Facility: HOSPITAL | Age: 51
Discharge: HOME | End: 2021-12-14
Attending: FAMILY MEDICINE | Admitting: FAMILY MEDICINE

## 2021-12-13 DIAGNOSIS — G43.809 OTHER MIGRAINE WITHOUT STATUS MIGRAINOSUS, NOT INTRACTABLE: Primary | ICD-10-CM

## 2021-12-13 LAB
ALBUMIN SERPL-MCNC: 4 G/DL (ref 3.5–5.2)
ALP BLD-CCNC: 97 U/L (ref 35–104)
ALT SERPL-CCNC: 25 U/L (ref 5–33)
ANION GAP SERPL CALCULATED.3IONS-SCNC: 11 MMOL/L (ref 7–19)
AST SERPL-CCNC: 33 U/L (ref 5–32)
BASOPHILS ABSOLUTE: 0.1 K/UL (ref 0–0.2)
BASOPHILS RELATIVE PERCENT: 1.2 % (ref 0–1)
BILIRUB SERPL-MCNC: 0.3 MG/DL (ref 0.2–1.2)
BUN BLDV-MCNC: 10 MG/DL (ref 6–20)
CALCIUM SERPL-MCNC: 9.8 MG/DL (ref 8.6–10)
CHLORIDE BLD-SCNC: 103 MMOL/L (ref 98–111)
CHOLESTEROL, TOTAL: 224 MG/DL (ref 160–199)
CO2: 24 MMOL/L (ref 22–29)
CREAT SERPL-MCNC: 0.6 MG/DL (ref 0.5–0.9)
EOSINOPHILS ABSOLUTE: 0.6 K/UL (ref 0–0.6)
EOSINOPHILS RELATIVE PERCENT: 5.4 % (ref 0–5)
GFR AFRICAN AMERICAN: >59
GFR NON-AFRICAN AMERICAN: >60
GLUCOSE BLD-MCNC: 93 MG/DL (ref 74–109)
HCT VFR BLD CALC: 40.3 % (ref 37–47)
HDLC SERPL-MCNC: 65 MG/DL (ref 65–121)
HEMOGLOBIN: 12.8 G/DL (ref 12–16)
HOLD SPECIMEN: NORMAL
IMMATURE GRANULOCYTES #: 0.1 K/UL
LDL CHOLESTEROL CALCULATED: 136 MG/DL
LYMPHOCYTES ABSOLUTE: 3.1 K/UL (ref 1.1–4.5)
LYMPHOCYTES RELATIVE PERCENT: 30.2 % (ref 20–40)
MCH RBC QN AUTO: 30.5 PG (ref 27–31)
MCHC RBC AUTO-ENTMCNC: 31.8 G/DL (ref 33–37)
MCV RBC AUTO: 96.2 FL (ref 81–99)
MONOCYTES ABSOLUTE: 0.6 K/UL (ref 0–0.9)
MONOCYTES RELATIVE PERCENT: 5.6 % (ref 0–10)
NEUTROPHILS ABSOLUTE: 5.8 K/UL (ref 1.5–7.5)
NEUTROPHILS RELATIVE PERCENT: 57.1 % (ref 50–65)
PDW BLD-RTO: 12.2 % (ref 11.5–14.5)
PLATELET # BLD: 404 K/UL (ref 130–400)
PMV BLD AUTO: 9.7 FL (ref 9.4–12.3)
POTASSIUM SERPL-SCNC: 4.3 MMOL/L (ref 3.5–5)
RBC # BLD: 4.19 M/UL (ref 4.2–5.4)
SODIUM BLD-SCNC: 138 MMOL/L (ref 136–145)
T4 FREE: 1.01 NG/DL (ref 0.93–1.7)
TOTAL PROTEIN: 7 G/DL (ref 6.6–8.7)
TRIGL SERPL-MCNC: 114 MG/DL (ref 0–149)
TSH SERPL DL<=0.05 MIU/L-ACNC: 0.98 UIU/ML (ref 0.27–4.2)
VITAMIN D 25-HYDROXY: 50 NG/ML
WBC # BLD: 10.2 K/UL (ref 4.8–10.8)
WHOLE BLOOD HOLD SPECIMEN: NORMAL

## 2021-12-13 PROCEDURE — 96361 HYDRATE IV INFUSION ADD-ON: CPT

## 2021-12-13 PROCEDURE — 96375 TX/PRO/DX INJ NEW DRUG ADDON: CPT

## 2021-12-13 PROCEDURE — 99283 EMERGENCY DEPT VISIT LOW MDM: CPT

## 2021-12-13 PROCEDURE — 96374 THER/PROPH/DIAG INJ IV PUSH: CPT

## 2021-12-13 RX ORDER — SODIUM CHLORIDE 0.9 % (FLUSH) 0.9 %
10 SYRINGE (ML) INJECTION AS NEEDED
Status: DISCONTINUED | OUTPATIENT
Start: 2021-12-13 | End: 2021-12-14 | Stop reason: HOSPADM

## 2021-12-13 RX ORDER — SODIUM CHLORIDE 9 MG/ML
125 INJECTION, SOLUTION INTRAVENOUS CONTINUOUS
Status: DISCONTINUED | OUTPATIENT
Start: 2021-12-13 | End: 2021-12-14 | Stop reason: HOSPADM

## 2021-12-13 RX ORDER — DIPHENHYDRAMINE HYDROCHLORIDE 50 MG/ML
25 INJECTION INTRAMUSCULAR; INTRAVENOUS ONCE
Status: COMPLETED | OUTPATIENT
Start: 2021-12-13 | End: 2021-12-14

## 2021-12-13 RX ORDER — DEXAMETHASONE SODIUM PHOSPHATE 10 MG/ML
10 INJECTION INTRAMUSCULAR; INTRAVENOUS ONCE
Status: COMPLETED | OUTPATIENT
Start: 2021-12-13 | End: 2021-12-14

## 2021-12-13 RX ORDER — METOCLOPRAMIDE HYDROCHLORIDE 5 MG/ML
10 INJECTION INTRAMUSCULAR; INTRAVENOUS ONCE
Status: COMPLETED | OUTPATIENT
Start: 2021-12-13 | End: 2021-12-14

## 2021-12-13 RX ORDER — KETOROLAC TROMETHAMINE 30 MG/ML
30 INJECTION, SOLUTION INTRAMUSCULAR; INTRAVENOUS ONCE
Status: COMPLETED | OUTPATIENT
Start: 2021-12-13 | End: 2021-12-14

## 2021-12-14 VITALS
DIASTOLIC BLOOD PRESSURE: 78 MMHG | RESPIRATION RATE: 20 BRPM | HEIGHT: 65 IN | HEART RATE: 100 BPM | BODY MASS INDEX: 27.99 KG/M2 | OXYGEN SATURATION: 96 % | SYSTOLIC BLOOD PRESSURE: 133 MMHG | TEMPERATURE: 98.2 F | WEIGHT: 168 LBS

## 2021-12-14 LAB — S PYO AG THROAT QL: NEGATIVE

## 2021-12-14 PROCEDURE — 25010000002 DIPHENHYDRAMINE PER 50 MG: Performed by: FAMILY MEDICINE

## 2021-12-14 PROCEDURE — 25010000002 METOCLOPRAMIDE PER 10 MG: Performed by: FAMILY MEDICINE

## 2021-12-14 PROCEDURE — 87880 STREP A ASSAY W/OPTIC: CPT | Performed by: FAMILY MEDICINE

## 2021-12-14 PROCEDURE — 96375 TX/PRO/DX INJ NEW DRUG ADDON: CPT

## 2021-12-14 PROCEDURE — 87081 CULTURE SCREEN ONLY: CPT | Performed by: FAMILY MEDICINE

## 2021-12-14 PROCEDURE — 25010000002 KETOROLAC TROMETHAMINE PER 15 MG: Performed by: FAMILY MEDICINE

## 2021-12-14 PROCEDURE — 96374 THER/PROPH/DIAG INJ IV PUSH: CPT

## 2021-12-14 PROCEDURE — 96361 HYDRATE IV INFUSION ADD-ON: CPT

## 2021-12-14 PROCEDURE — 25010000002 DEXAMETHASONE PER 1 MG: Performed by: FAMILY MEDICINE

## 2021-12-14 RX ADMIN — DEXAMETHASONE SODIUM PHOSPHATE 10 MG: 10 INJECTION INTRAMUSCULAR; INTRAVENOUS at 00:02

## 2021-12-14 RX ADMIN — KETOROLAC TROMETHAMINE 30 MG: 30 INJECTION, SOLUTION INTRAMUSCULAR; INTRAVENOUS at 00:02

## 2021-12-14 RX ADMIN — DIPHENHYDRAMINE HYDROCHLORIDE 25 MG: 50 INJECTION INTRAMUSCULAR; INTRAVENOUS at 00:02

## 2021-12-14 RX ADMIN — METOCLOPRAMIDE 10 MG: 5 INJECTION, SOLUTION INTRAMUSCULAR; INTRAVENOUS at 00:02

## 2021-12-14 RX ADMIN — SODIUM CHLORIDE 1000 ML: 9 INJECTION, SOLUTION INTRAVENOUS at 00:01

## 2021-12-14 NOTE — ED NOTES
Pt reports migraine x 3 weeks. States she has neurologist, but not for migraines. She says today it is the worst it has been and has taken tylenol today with no relief. States she has pain extending into her Lt tonsil, which is abnormal for her.      Tiana Holliday RN  12/13/21 2226

## 2021-12-14 NOTE — DISCHARGE INSTRUCTIONS
We discussed, please talk to your doctors about possible daily prophylactic medications to treat your migraine headaches.

## 2021-12-14 NOTE — ED PROVIDER NOTES
Subjective   Patient has a very long history of persistent and chronic migraines.  She presents with the same today.  She has had no fever or other systemic symptoms.  She has no neck stiffness.  She has no trauma.  The pain she is suffering now is typical for her migraines except that now she also has it extending, she feels, into her throat.          Review of Systems   Neurological: Positive for headaches.   All other systems reviewed and are negative.      Past Medical History:   Diagnosis Date   • Allergic rhinitis    • Asthma    • Chronic UTI    • Endometriosis    • Fibromyalgia    • High cholesterol    • Hypertension    • IBS (irritable bowel syndrome)    • Meniere disease    • Migraines        Allergies   Allergen Reactions   • Decongestant [Pseudoephedrine] Shortness Of Breath and Swelling     Per patient she is allergic to Actifed and anything like that.    • Codimal-A [Brompheniramine] Itching   • Levaquin [Levofloxacin] Itching     Per patient   • Percocet [Oxycodone-Acetaminophen] Nausea And Vomiting       Past Surgical History:   Procedure Laterality Date   • CHOLECYSTECTOMY     • CYST REMOVAL     • ENDOMETRIAL BIOPSY         Family History   Problem Relation Age of Onset   • Cancer Mother    • Breast cancer Mother 58   • Heart disease Father        Social History     Socioeconomic History   • Marital status:    Tobacco Use   • Smoking status: Never Smoker   • Smokeless tobacco: Never Used   Vaping Use   • Vaping Use: Never used   Substance and Sexual Activity   • Alcohol use: No   • Drug use: Defer   • Sexual activity: Defer           Objective   Physical Exam  Vitals and nursing note reviewed.   Constitutional:       Appearance: She is well-developed.   HENT:      Head: Normocephalic and atraumatic.      Comments: Oropharynx is normal     Right Ear: External ear normal.      Left Ear: External ear normal.      Nose: Nose normal.      Mouth/Throat:      Mouth: Mucous membranes are moist.       Pharynx: Oropharynx is clear.   Eyes:      Conjunctiva/sclera: Conjunctivae normal.   Cardiovascular:      Rate and Rhythm: Normal rate and regular rhythm.      Heart sounds: Normal heart sounds.   Pulmonary:      Effort: Pulmonary effort is normal.      Breath sounds: Normal breath sounds.   Abdominal:      General: Bowel sounds are normal.      Palpations: Abdomen is soft.   Musculoskeletal:         General: Normal range of motion.      Cervical back: Normal range of motion and neck supple. No rigidity.   Skin:     General: Skin is warm and dry.      Capillary Refill: Capillary refill takes less than 2 seconds.   Neurological:      Mental Status: She is alert and oriented to person, place, and time.   Psychiatric:         Behavior: Behavior normal.         Thought Content: Thought content normal.         Judgment: Judgment normal.         Procedures           ED Course                                                 MDM  Number of Diagnoses or Management Options  Patient Progress  Patient progress: stable      Final diagnoses:   Other migraine without status migrainosus, not intractable       ED Disposition  ED Disposition     ED Disposition Condition Comment    Discharge Stable           Caitlyn Reese MD  [address] [place] [address] [address] [place] [address]  [phone]               Medication List      No changes were made to your prescriptions during this visit.         The patient symptoms were much improved after treatment in the ED.  She felt she was stable to go home and is happy to follow-up with her neurologist to discuss, at my suggestion, prophylactic daily medication to try and keep her migraines at bay on a more consistent basis.  She understands to return to the ED for any worsening of symptoms.     Ronny Lay MD  12/14/21 0138

## 2021-12-16 LAB — BACTERIA SPEC AEROBE CULT: NORMAL

## 2021-12-17 LAB
METANEPH/PLASMA INTERP: ABNORMAL
METANEPHRINE FREE PLASMA: 0.14 NMOL/L (ref 0–0.49)
NORMETANEPHRINE FREE PLASMA: 1.03 NMOL/L (ref 0–0.89)

## 2022-01-03 LAB
AMPHETAMINE SCREEN, URINE: NEGATIVE
BARBITURATE SCREEN URINE: NEGATIVE
BENZODIAZEPINE SCREEN, URINE: NEGATIVE
CANNABINOID SCREEN URINE: NEGATIVE
COCAINE METABOLITE SCREEN URINE: NEGATIVE
Lab: NORMAL
OPIATE SCREEN URINE: NEGATIVE

## 2022-01-06 LAB
CREATININE 24 HOUR URINE: 1028 MG/D (ref 700–1600)
CREATININE URINE: 43 MG/DL
HOURS COLLECTED: 24
METANEPHRINE INTREP URINE: ABNORMAL
METANEPHRINE UG/G CRE: 79 UG/G CRT (ref 0–300)
METANEPHRINE, UR-PER VOL: 34 UG/L
METANEPHRINES URINE: 81 UG/D (ref 36–229)
NORMETANEPHRINE 24 HOUR URINE: 468 UG/D (ref 95–650)
NORMETANEPHRINE, (G/CRT): 456 UG/G CRT (ref 0–400)
NORMETANEPHRINES, NMOL/L: 196 UG/L
URINE TOTAL VOLUME: 2390

## 2022-01-20 RX ORDER — ATENOLOL 25 MG/1
TABLET ORAL
Qty: 30 TABLET | Refills: 11 | Status: SHIPPED | OUTPATIENT
Start: 2022-01-20 | End: 2022-07-20

## 2022-03-04 ENCOUNTER — HOSPITAL ENCOUNTER (OUTPATIENT)
Dept: WOMENS IMAGING | Age: 52
Discharge: HOME | End: 2022-03-04
Payer: COMMERCIAL

## 2022-03-04 DIAGNOSIS — Z12.31 ENCOUNTER FOR SCREENING MAMMOGRAM FOR MALIGNANT NEOPLASM OF BREAST: ICD-10-CM

## 2022-03-04 PROCEDURE — 77067 SCR MAMMO BI INCL CAD: CPT

## 2022-05-17 ENCOUNTER — LAB (OUTPATIENT)
Dept: LAB | Facility: HOSPITAL | Age: 52
End: 2022-05-17

## 2022-05-17 DIAGNOSIS — Z20.822 ENCOUNTER FOR PREOPERATIVE SCREENING LABORATORY TESTING FOR COVID-19 VIRUS: ICD-10-CM

## 2022-05-17 DIAGNOSIS — Z01.812 ENCOUNTER FOR PREOPERATIVE SCREENING LABORATORY TESTING FOR COVID-19 VIRUS: ICD-10-CM

## 2022-05-17 LAB — SARS-COV-2 ORF1AB RESP QL NAA+PROBE: NOT DETECTED

## 2022-05-17 PROCEDURE — C9803 HOPD COVID-19 SPEC COLLECT: HCPCS

## 2022-05-17 PROCEDURE — U0004 COV-19 TEST NON-CDC HGH THRU: HCPCS

## 2022-05-19 ENCOUNTER — OFFICE VISIT (OUTPATIENT)
Dept: PULMONOLOGY | Facility: CLINIC | Age: 52
End: 2022-05-19

## 2022-05-19 VITALS
BODY MASS INDEX: 26.06 KG/M2 | HEIGHT: 65 IN | HEART RATE: 105 BPM | OXYGEN SATURATION: 98 % | DIASTOLIC BLOOD PRESSURE: 70 MMHG | WEIGHT: 156.4 LBS | SYSTOLIC BLOOD PRESSURE: 108 MMHG

## 2022-05-19 DIAGNOSIS — R91.8 MULTIPLE LUNG NODULES: ICD-10-CM

## 2022-05-19 DIAGNOSIS — Z20.822 ENCOUNTER FOR PREOPERATIVE SCREENING LABORATORY TESTING FOR COVID-19 VIRUS: ICD-10-CM

## 2022-05-19 DIAGNOSIS — Z01.812 ENCOUNTER FOR PREOPERATIVE SCREENING LABORATORY TESTING FOR COVID-19 VIRUS: ICD-10-CM

## 2022-05-19 DIAGNOSIS — R06.02 SHORTNESS OF BREATH: Primary | ICD-10-CM

## 2022-05-19 PROCEDURE — 94010 BREATHING CAPACITY TEST: CPT | Performed by: INTERNAL MEDICINE

## 2022-05-19 PROCEDURE — 99213 OFFICE O/P EST LOW 20 MIN: CPT | Performed by: INTERNAL MEDICINE

## 2022-05-19 NOTE — PROGRESS NOTES
"Background:  Pt with dyspnea and multiple lung nodules identified 9/2018   Chief Complaint  Shortness of Breath and Asthma    Subjective    History of Present Illness       Mariann Portillo presents to [facility].  She has been doing well.  No new complaints.  She does not have increased dyspnea. No wheezing.  We have followed her for some lung nodules and focal areas of fibrosis     Tobacco Use: Low Risk    • Smoking Tobacco Use: Never Smoker   • Smokeless Tobacco Use: Never Used      Objective     Vital Signs:   /70   Pulse 105   Ht 165.1 cm (65\")   Wt 70.9 kg (156 lb 6.4 oz)   SpO2 98% Comment: ra  BMI 26.03 kg/m²   Physical Exam  Constitutional:       General: She is not in acute distress.     Appearance: She is well-developed. She is not ill-appearing or toxic-appearing.   HENT:      Head: Atraumatic.   Eyes:      General: No scleral icterus.     Conjunctiva/sclera: Conjunctivae normal.   Cardiovascular:      Rate and Rhythm: Normal rate and regular rhythm.      Heart sounds: S1 normal and S2 normal.   Pulmonary:      Effort: Pulmonary effort is normal.      Breath sounds: Normal breath sounds. No wheezing or rales.   Abdominal:      General: There is no distension.   Musculoskeletal:         General: No deformity.      Cervical back: Neck supple.   Skin:     Coloration: Skin is not pale.      Findings: No rash.   Neurological:      Mental Status: She is alert.        Result Review  Data Reviewed:{ Labs  Result Review  Imaging  Media :23}   CT CHEST WO CONTRAST (11/12/2021 17:08 EST)  The scattered areas of nodular infiltrate is stable and   represent chronic inflammatory process. No discrete lung nodules with   features of lung malignancy is noted. No further follow-up for these   nodules is warranted.       PFT Values        Some values may be hidden. Unless noted otherwise, only the newest values recorded on each date are displayed.         Old " Values PFT Results 11/18/21 5/19/22   No data to display.      Pre Drug PFT Results 11/18/21 5/19/22   FVC 95 80   FEV1 93 85   FEF 25-75% 97 118   FEV1/FVC 80.68 85      Post Drug PFT Results 11/18/21 5/19/22   No data to display.      Other Tests PFT Results 11/18/21 5/19/22   DLCO 85    D/VAsb 97                       Assessment and Plan  {CC Problem List  Visit Diagnosis  ROS  Review (Popup)  Health Maintenance  Quality  BestPractice  Medications  SmartSets  SnapShot Encounters  Media :23}   Diagnoses and all orders for this visit:    1. Shortness of breath (Primary)  -     Pulmonary Function Test  -     XR Chest 2 View; Future    2. Encounter for preoperative screening laboratory testing for COVID-19 virus  -     COVID PRE-OP / PRE-PROCEDURE SCREENING ORDER (NO ISOLATION) - Swab, Nasal Cavity; Future    3. Multiple lung nodules    she appears stable with normal spirometry.  I'd like to watch the fibrotic focus felt to represent an inflammatory process by radiologist.  Repeat cxr next year; call sooner as needed.    Follow Up {Instructions Charge Capture  Follow-up Communications :23}   Return in about 1 year (around 5/19/2023).  Patient was given instructions and counseling regarding her condition or for health maintenance advice. Please see specific information pulled into the AVS if appropriate.    Electronically signed by Khanh Mendoza MD, 5/19/2022, 18:06 CDT

## 2022-05-19 NOTE — PROCEDURES
Pulmonary Function Test  Performed by: Alexandra Heredia, RRT  Authorized by: Khanh Mendoza MD      Pre Drug % Predicted    FVC: 80%   FEV1: 85%   FEF 25-75%: 118%   FEV1/FVC: 85%    Interpretation   Spirometry   Spirometry shows normal results. midflow is normal.  Review of FVL curve   Patient's effort is normal.

## 2022-06-08 ENCOUNTER — OFFICE VISIT (OUTPATIENT)
Dept: CARDIOLOGY | Facility: CLINIC | Age: 52
End: 2022-06-08

## 2022-06-08 VITALS
OXYGEN SATURATION: 99 % | HEIGHT: 65 IN | SYSTOLIC BLOOD PRESSURE: 112 MMHG | DIASTOLIC BLOOD PRESSURE: 78 MMHG | BODY MASS INDEX: 25.66 KG/M2 | WEIGHT: 154 LBS | HEART RATE: 66 BPM

## 2022-06-08 DIAGNOSIS — I47.1 PAROXYSMAL SVT (SUPRAVENTRICULAR TACHYCARDIA): Primary | ICD-10-CM

## 2022-06-08 DIAGNOSIS — G90.A POTS (POSTURAL ORTHOSTATIC TACHYCARDIA SYNDROME): ICD-10-CM

## 2022-06-08 DIAGNOSIS — I10 PRIMARY HYPERTENSION: ICD-10-CM

## 2022-06-08 PROCEDURE — 99213 OFFICE O/P EST LOW 20 MIN: CPT | Performed by: NURSE PRACTITIONER

## 2022-06-08 PROCEDURE — 93000 ELECTROCARDIOGRAM COMPLETE: CPT | Performed by: NURSE PRACTITIONER

## 2022-06-08 RX ORDER — MIDODRINE HYDROCHLORIDE 10 MG/1
10 TABLET ORAL
Qty: 270 TABLET | Refills: 3 | Status: SHIPPED | OUTPATIENT
Start: 2022-06-08

## 2022-06-08 NOTE — PROGRESS NOTES
"Chief Complaint  Palpitations (6mo F/U. )    Subjective          Mariann Portillo presents to [facility] for routine follow-up.  She has paroxysmal supraventricular tachycardia, POTS and hypertension. She complains of increased shortness of breath, which she relates to hypotension related to POTS. She continues to report intermittent palpitations as well. Patient denies chest pain, dizziness, syncope, orthopnea, PND, edema or decreased stamina.  Patient denies any signs of bleeding.    Hypertension  This is a chronic problem. The current episode started more than 1 year ago. The problem is controlled. Associated symptoms include palpitations and shortness of breath. Pertinent negatives include no anxiety, blurred vision, chest pain, headaches, malaise/fatigue, neck pain, orthopnea, peripheral edema, PND or sweats. Risk factors for coronary artery disease include dyslipidemia. Current antihypertension treatment includes beta blockers. The current treatment provides significant improvement.       Objective   Vital Signs:   /78   Pulse 66   Ht 165.1 cm (65\")   Wt 69.9 kg (154 lb)   SpO2 99%   BMI 25.63 kg/m²     Vitals and nursing note reviewed.   Constitutional:       General: Not in acute distress.     Appearance: Normal and healthy appearance. Well-developed, overweight and not in distress. Not diaphoretic.   Eyes:      General: Lids are normal.         Right eye: No discharge.         Left eye: No discharge.      Conjunctiva/sclera: Conjunctivae normal.      Pupils: Pupils are equal, round, and reactive to light.   HENT:      Head: Normocephalic and atraumatic.      Jaw: There is normal jaw occlusion.      Right Ear: External ear normal.      Left Ear: External ear normal.      Nose: Nose normal.   Neck:      Thyroid: No thyromegaly.      Vascular: No carotid bruit, JVD or JVR. JVD normal.      Trachea: Trachea normal. No tracheal deviation.   Pulmonary:      Effort: Pulmonary " effort is normal. No respiratory distress.      Breath sounds: Normal breath sounds. No decreased breath sounds. No wheezing. No rhonchi. No rales.   Chest:      Chest wall: Not tender to palpatation.   Cardiovascular:      PMI at left midclavicular line. Normal rate. Regular rhythm. Normal S1. Normal S2.      Murmurs: There is no murmur.      No gallop. No click. No rub.   Pulses:     Intact distal pulses. No decreased pulses.   Edema:     Peripheral edema absent.   Abdominal:      General: Bowel sounds are normal. There is no distension.      Palpations: Abdomen is soft.      Tenderness: There is no abdominal tenderness.   Musculoskeletal: Normal range of motion.         General: No tenderness or deformity.      Cervical back: Normal range of motion and neck supple. Skin:     General: Skin is warm and dry.      Coloration: Skin is not pale.      Findings: No erythema or rash.   Neurological:      General: No focal deficit present.      Mental Status: Alert, oriented to person, place, and time and oriented to person, place and time.   Psychiatric:         Attention and Perception: Attention and perception normal.         Mood and Affect: Mood and affect normal.         Speech: Speech normal.         Behavior: Behavior normal.         Thought Content: Thought content normal.         Cognition and Memory: Cognition and memory normal.         Judgment: Judgment normal.        Result Review :   The following data was reviewed by: TASNEEM Luis on 06/08/2022:  Common labs    Common Labsle 12/13/21 12/13/21 12/13/21    1754 1754 1754   Glucose   93   BUN   10   Creatinine   0.6   eGFR Non  Am   >60   eGFR African Am   >59   Sodium   138   Potassium   4.3   Chloride   103   Calcium   9.8   Albumin   4   Total Bilirubin   0.3   Alkaline Phosphatase   97   AST (SGOT)   33 (A)   ALT (SGPT)   25   WBC 10.2     Hemoglobin 12.8     Hematocrit 40.3     Platelets 404 (A)     Total Cholesterol  224 (A)     Triglycerides  114    HDL Cholesterol  65    LDL Cholesterol   136    (A) Abnormal value       Comments are available for some flowsheets but are not being displayed.           Data reviewed: Cardiology studies holter monitor 11/8/20 and 2d echo 11/4/20     ECG 12 Lead    Date/Time: 6/8/2022 3:39 PM  Performed by: Taniya Miner APRN  Authorized by: Taniya Miner APRN   Comparison: compared with previous ECG from 7/27/2021  Similar to previous ECG  Rhythm: sinus rhythm  Rate: normal  BPM: 66    Clinical impression: normal ECG              Assessment and Plan    Diagnoses and all orders for this visit:    1. Paroxysmal SVT (supraventricular tachycardia) (HCC) (Primary)-2 runs with longest duration of 6 beats on 14-day Holter monitor 10/7/2020.  Stable.  Continue atenolol.    2. POTS (postural orthostatic tachycardia syndrome)- no recent syncopal episodes.  Stable.  Continue midodrine.    3. Primary hypertension-blood pressures are well controlled.  Continue atenolol.  Monitor and record daily blood pressure. Report readings consistently higher than 130/90 or consistently lower than 100/60.         Follow Up   Return in about 6 months (around 12/8/2022) for Next scheduled follow up with Dr. Arguelles.  Patient was given instructions and counseling regarding her condition or for health maintenance advice. Please see specific information pulled into the AVS if appropriate.

## 2022-07-21 RX ORDER — ATENOLOL 25 MG/1
TABLET ORAL
Qty: 30 TABLET | Refills: 11 | Status: SHIPPED | OUTPATIENT
Start: 2022-07-21

## 2022-08-23 LAB
ALBUMIN SERPL-MCNC: 4.6 G/DL (ref 3.5–5.2)
ALP BLD-CCNC: 100 U/L (ref 35–104)
ALT SERPL-CCNC: 12 U/L (ref 5–33)
ANION GAP SERPL CALCULATED.3IONS-SCNC: 10 MMOL/L (ref 7–19)
AST SERPL-CCNC: 19 U/L (ref 5–32)
BASOPHILS ABSOLUTE: 0.1 K/UL (ref 0–0.2)
BASOPHILS RELATIVE PERCENT: 1 % (ref 0–1)
BILIRUB SERPL-MCNC: 0.3 MG/DL (ref 0.2–1.2)
BUN BLDV-MCNC: 14 MG/DL (ref 6–20)
CALCIUM SERPL-MCNC: 10 MG/DL (ref 8.6–10)
CHLORIDE BLD-SCNC: 103 MMOL/L (ref 98–111)
CHOLESTEROL, TOTAL: 241 MG/DL (ref 160–199)
CO2: 27 MMOL/L (ref 22–29)
CREAT SERPL-MCNC: 0.9 MG/DL (ref 0.5–0.9)
EOSINOPHILS ABSOLUTE: 0.2 K/UL (ref 0–0.6)
EOSINOPHILS RELATIVE PERCENT: 2.4 % (ref 0–5)
GFR AFRICAN AMERICAN: >59
GFR NON-AFRICAN AMERICAN: >60
GLUCOSE BLD-MCNC: 99 MG/DL (ref 74–109)
HBA1C MFR BLD: 5.6 % (ref 4–6)
HCT VFR BLD CALC: 42 % (ref 37–47)
HDLC SERPL-MCNC: 61 MG/DL (ref 65–121)
HEMOGLOBIN: 13.8 G/DL (ref 12–16)
IMMATURE GRANULOCYTES #: 0.1 K/UL
LDL CHOLESTEROL CALCULATED: 157 MG/DL
LYMPHOCYTES ABSOLUTE: 2.8 K/UL (ref 1.1–4.5)
LYMPHOCYTES RELATIVE PERCENT: 32.4 % (ref 20–40)
MCH RBC QN AUTO: 30.9 PG (ref 27–31)
MCHC RBC AUTO-ENTMCNC: 32.9 G/DL (ref 33–37)
MCV RBC AUTO: 94.2 FL (ref 81–99)
MONOCYTES ABSOLUTE: 0.5 K/UL (ref 0–0.9)
MONOCYTES RELATIVE PERCENT: 5.5 % (ref 0–10)
NEUTROPHILS ABSOLUTE: 5.1 K/UL (ref 1.5–7.5)
NEUTROPHILS RELATIVE PERCENT: 58 % (ref 50–65)
PDW BLD-RTO: 11.8 % (ref 11.5–14.5)
PLATELET # BLD: 441 K/UL (ref 130–400)
PMV BLD AUTO: 9.4 FL (ref 9.4–12.3)
POTASSIUM SERPL-SCNC: 4.7 MMOL/L (ref 3.5–5)
RBC # BLD: 4.46 M/UL (ref 4.2–5.4)
SODIUM BLD-SCNC: 140 MMOL/L (ref 136–145)
T4 FREE: 1.01 NG/DL (ref 0.93–1.7)
TOTAL PROTEIN: 7.5 G/DL (ref 6.6–8.7)
TRIGL SERPL-MCNC: 114 MG/DL (ref 0–149)
TSH SERPL DL<=0.05 MIU/L-ACNC: 0.94 UIU/ML (ref 0.27–4.2)
VITAMIN B-12: 1329 PG/ML (ref 211–946)
VITAMIN D 25-HYDROXY: 54.9 NG/ML
WBC # BLD: 8.7 K/UL (ref 4.8–10.8)

## 2022-09-02 ENCOUNTER — TELEPHONE (OUTPATIENT)
Dept: CARDIOLOGY | Facility: CLINIC | Age: 52
End: 2022-09-02

## 2022-09-02 NOTE — TELEPHONE ENCOUNTER
Called patient to touch base with how she's feeling due to her blood pressure reading at Dr. Caitlyn Reese MD. Blood pressure was reported to be running 98/70 by Beba with . Blood pressure readings have been sent to TASNEEM Carrasco.

## 2022-09-15 ENCOUNTER — TELEPHONE (OUTPATIENT)
Dept: CARDIOLOGY | Facility: CLINIC | Age: 52
End: 2022-09-15

## 2022-09-15 RX ORDER — FLUDROCORTISONE ACETATE 0.1 MG/1
0.1 TABLET ORAL DAILY
Qty: 30 TABLET | Refills: 11 | Status: SHIPPED | OUTPATIENT
Start: 2022-09-15 | End: 2022-10-13 | Stop reason: SDUPTHER

## 2022-09-22 NOTE — TELEPHONE ENCOUNTER
Patient states that she is going to begin taking OTC Thermotabs 287 mg in addition to the fludrocortisone 0.1 mg and midodrine to see if it will help raise her bp. Patient states that she was taking it as directed by  but had quit it in June due to feeling bloated.

## 2022-09-22 NOTE — TELEPHONE ENCOUNTER
I attempted to return patients call about her blood pressure still running low. Patient had voiced questions/concerns about her medications.WF

## 2022-10-07 ENCOUNTER — TRANSCRIBE ORDERS (OUTPATIENT)
Dept: ADMINISTRATIVE | Facility: HOSPITAL | Age: 52
End: 2022-10-07

## 2022-10-07 DIAGNOSIS — R20.0 BILATERAL HAND NUMBNESS: Primary | ICD-10-CM

## 2022-10-25 ENCOUNTER — HOSPITAL ENCOUNTER (OUTPATIENT)
Dept: MRI IMAGING | Facility: HOSPITAL | Age: 52
Discharge: HOME | End: 2022-10-25
Admitting: PSYCHIATRY & NEUROLOGY

## 2022-10-25 LAB — CREAT BLDA-MCNC: 0.7 MG/DL (ref 0.6–1.3)

## 2022-10-25 PROCEDURE — 72156 MRI NECK SPINE W/O & W/DYE: CPT

## 2022-10-25 PROCEDURE — A9577 INJ MULTIHANCE: HCPCS | Performed by: PSYCHIATRY & NEUROLOGY

## 2022-10-25 PROCEDURE — 82565 ASSAY OF CREATININE: CPT

## 2022-10-25 PROCEDURE — 0 GADOBENATE DIMEGLUMINE 529 MG/ML SOLUTION: Performed by: PSYCHIATRY & NEUROLOGY

## 2022-10-25 RX ADMIN — GADOBENATE DIMEGLUMINE 13 ML: 529 INJECTION, SOLUTION INTRAVENOUS at 15:09

## 2022-10-26 ENCOUNTER — TELEPHONE (OUTPATIENT)
Dept: CARDIOLOGY | Facility: CLINIC | Age: 52
End: 2022-10-26

## 2022-10-26 NOTE — TELEPHONE ENCOUNTER
Caller: Mariann Portillo    Relationship: Self    Best call back number: [phone]    What is the best time to reach you: ANY     Who are you requesting to speak with (clinical staff, provider,  specific staff member): MARY        What was the call regarding: PT RETURNED ELYSIA CALL TO THE HUB. PLEASE CALL PT BACK.     Do you require a callback: YES

## 2022-10-27 ENCOUNTER — TELEPHONE (OUTPATIENT)
Dept: CARDIOLOGY | Facility: CLINIC | Age: 52
End: 2022-10-27

## 2022-10-27 NOTE — TELEPHONE ENCOUNTER
Left voicemail for patient to call office. Taniya will be out of the office tomorrow so her appointment needs to be rescheduled.

## 2022-12-21 ENCOUNTER — TRANSCRIBE ORDERS (OUTPATIENT)
Dept: ADMINISTRATIVE | Facility: HOSPITAL | Age: 52
End: 2022-12-21

## 2022-12-21 DIAGNOSIS — M79.606 PAIN OF LOWER EXTREMITY, UNSPECIFIED LATERALITY: ICD-10-CM

## 2022-12-21 DIAGNOSIS — M54.10 RADICULOPATHY, UNSPECIFIED SPINAL REGION: ICD-10-CM

## 2022-12-21 DIAGNOSIS — M54.50 LOW BACK PAIN, UNSPECIFIED BACK PAIN LATERALITY, UNSPECIFIED CHRONICITY, UNSPECIFIED WHETHER SCIATICA PRESENT: Primary | ICD-10-CM

## 2022-12-28 ENCOUNTER — APPOINTMENT (OUTPATIENT)
Dept: MRI IMAGING | Facility: HOSPITAL | Age: 52
End: 2022-12-28

## 2023-01-05 ENCOUNTER — APPOINTMENT (OUTPATIENT)
Dept: MRI IMAGING | Facility: HOSPITAL | Age: 53
End: 2023-01-05
Payer: COMMERCIAL

## 2023-01-06 ENCOUNTER — OFFICE VISIT (OUTPATIENT)
Dept: CARDIOLOGY | Facility: CLINIC | Age: 53
End: 2023-01-06
Payer: COMMERCIAL

## 2023-01-06 VITALS
OXYGEN SATURATION: 97 % | HEART RATE: 77 BPM | DIASTOLIC BLOOD PRESSURE: 76 MMHG | WEIGHT: 156 LBS | HEIGHT: 65 IN | BODY MASS INDEX: 25.99 KG/M2 | SYSTOLIC BLOOD PRESSURE: 116 MMHG

## 2023-01-06 DIAGNOSIS — R42 DIZZY SPELLS: ICD-10-CM

## 2023-01-06 DIAGNOSIS — I10 PRIMARY HYPERTENSION: ICD-10-CM

## 2023-01-06 DIAGNOSIS — R06.02 SHORTNESS OF BREATH: ICD-10-CM

## 2023-01-06 DIAGNOSIS — E88.81 INSULIN RESISTANCE: ICD-10-CM

## 2023-01-06 DIAGNOSIS — E78.2 MIXED HYPERLIPIDEMIA: ICD-10-CM

## 2023-01-06 DIAGNOSIS — G90.A POTS (POSTURAL ORTHOSTATIC TACHYCARDIA SYNDROME): Primary | ICD-10-CM

## 2023-01-06 DIAGNOSIS — I47.1 PAROXYSMAL SVT (SUPRAVENTRICULAR TACHYCARDIA): ICD-10-CM

## 2023-01-06 DIAGNOSIS — M79.7 FIBROMYALGIA: ICD-10-CM

## 2023-01-06 DIAGNOSIS — R00.2 PALPITATIONS: ICD-10-CM

## 2023-01-06 PROBLEM — E88.819 INSULIN RESISTANCE: Status: ACTIVE | Noted: 2023-01-06

## 2023-01-06 PROCEDURE — 99214 OFFICE O/P EST MOD 30 MIN: CPT | Performed by: INTERNAL MEDICINE

## 2023-01-06 PROCEDURE — 93000 ELECTROCARDIOGRAM COMPLETE: CPT | Performed by: INTERNAL MEDICINE

## 2023-01-06 RX ORDER — FLUDROCORTISONE ACETATE 0.1 MG/1
0.2 TABLET ORAL DAILY
Qty: 180 TABLET | Refills: 3 | Status: SHIPPED | OUTPATIENT
Start: 2023-01-06

## 2023-01-06 NOTE — PROGRESS NOTES
Mariann Portillo  [id number]  1970  52 y.o.  female         Referring Provider: Caitlyn Reese MD    Reason for  Visit:    routine follow up   Initial visit for palpitations and shortness of breath     Subjective    Has had low BP at times   Was seen by Dr Martinez and started on Mestinon that helped GI issues but not BP   No new events or complaints since last visit     She is tolerating midodrine well 10 mg up to 3/day  Takes atenolol 12.5 mg p.o. daily    Overall no significant change in her symptoms  Has dizzy spells with drop in blood pressure    Does not feel palpitations to any large extent  No syncope  No new events or occurrences    Has increased sodium intake  Continues on other medications for fibromyalgia  Started on Mestinon by autonomic clinic and tolerating well    No bleeding, excessive bruising, gait instability or fall risks    Strong family history of early coronary artery disease         History of present illness:  Mariann Portillo is a 52 y.o. yo female with fibromyalgia who presents today for   Chief Complaint   Patient presents with   • Palpitations     6 MO FU- PAROXYSMAL SVT-POTS   • Shortness of Breath   • Fatigue     More fatigued than normal   .    History  Past Medical History:   Diagnosis Date   • Allergic rhinitis    • Asthma    • Asthma, extrinsic diagnosed between 2000 & 2003    do not know if extrinsic or intrinsic   • Asthma, intrinsic diagnosed between 2000 & 2003    do not know if extrinsic or intrinsic   • Chronic UTI    • Endometriosis    • Fibromyalgia    • High cholesterol    • Hypertension    • IBS (irritable bowel syndrome)    • Lung nodule 2018 or 2019    ground-glass opacity, right lung   • Meniere disease    • Migraines    ,   Past Surgical History:   Procedure Laterality Date   • CHOLECYSTECTOMY     • CYST REMOVAL     • ENDOMETRIAL BIOPSY     ,   Family History   Problem Relation Age of Onset   • Cancer Mother         breast cancer   • Breast cancer Mother 58   •  Heart disease Father         Details unknown.   • Heart attack Paternal Grandfather          in sleep at age 40.   • Hyperlipidemia Brother         LDL high, HDL extremely low.   ,   Social History     Tobacco Use   • Smoking status: Never   • Smokeless tobacco: Never   Vaping Use   • Vaping Use: Never used   Substance Use Topics   • Alcohol use: Never   • Drug use: Never   ,     Medications  Current Outpatient Medications   Medication Sig Dispense Refill   • albuterol sulfate  (90 Base) MCG/ACT inhaler Inhale 2 puffs Every 4 (Four) Hours. Rinse mouth after use. 6.7 g 0   • atenolol (TENORMIN) 25 MG tablet TAKE 1 TABLET BY MOUTH ONCE DAILY 30 tablet 11   • Eucrisa 2 % ointment APPLY ON THE SKIN TWICE DAILY     • fludrocortisone 0.1 MG tablet Take 2 tablets by mouth Daily. 180 tablet 3   • fluticasone (Flonase) 50 MCG/ACT nasal spray 2 sprays into the nostril(s) as directed by provider Daily. 2 puffs each nostril 16 g 0   • imipramine (TOFRANIL) 25 MG tablet Take 25 mg by mouth 4 (Four) Times a Day.     • Melatonin 10 MG tablet Take  by mouth As Needed.     • metFORMIN (GLUCOPHAGE) 850 MG tablet 850 mg 3 (Three) Times a Day.     • methylPREDNISolone (MEDROL) 4 MG dose pack Take as directed on package instructions. 21 each 0   • midodrine (PROAMATINE) 10 MG tablet Take 1 tablet by mouth 3 (Three) Times a Day Before Meals. 270 tablet 3   • mometasone (ELOCON) 0.1 % cream      • nitrofurantoin (MACRODANTIN) 100 MG capsule Take 100 mg by mouth.     • ondansetron ODT (ZOFRAN-ODT) 8 MG disintegrating tablet Place 1 tablet on the tongue Every 8 (Eight) Hours As Needed for Nausea or Vomiting. 12 tablet 0   • PB-Hyoscy-Atropine-Scopolamine (DONNATAL) 16.2 MG per tablet 1 tablet     • phenazopyridine (PYRIDIUM) 100 MG tablet Take 100 mg by mouth 3 (Three) Times a Day As Needed.     • pimecrolimus (ELIDEL) 1 % cream TRINITY ON THE SKIN BID  1   • pyridostigmine (MESTINON) 60 MG tablet Take 1 tablet by mouth 2 (Two) Times  a Day.     • therapeutic multivitamin-minerals (THERAGRAN-M) tablet Take 1 tablet by mouth.     • vitamin B-12 (CYANOCOBALAMIN) 1000 MCG tablet Take 1,000 mcg by mouth.     • vitamin D (ERGOCALCIFEROL) 50795 units capsule capsule Every 7 (Seven) Days.     • zolpidem CR (AMBIEN CR) 6.25 MG CR tablet        No current facility-administered medications for this visit.       Allergies:  Decongestant [pseudoephedrine], Codimal-a [brompheniramine], Levaquin [levofloxacin], and Percocet [oxycodone-acetaminophen]    Review of Systems  Review of Systems   Constitutional: Negative.   HENT: Negative.    Eyes: Negative.    Cardiovascular: Positive for dyspnea on exertion, palpitations and syncope. Negative for chest pain, claudication, cyanosis, irregular heartbeat, leg swelling, near-syncope, orthopnea and paroxysmal nocturnal dyspnea.   Respiratory: Negative.    Endocrine: Negative.    Hematologic/Lymphatic: Negative.    Skin: Negative.    Gastrointestinal: Negative for anorexia.   Genitourinary: Negative.    Neurological: Positive for dizziness.   Psychiatric/Behavioral: Negative.        Objective     Physical Exam:  /76 (BP Location: Left arm, Patient Position: Sitting, Cuff Size: Large Adult)   Pulse 77   Ht 165.1 cm (65\")   Wt 70.8 kg (156 lb)   SpO2 97%   BMI 25.96 kg/m²       Physical Exam  Constitutional:       Appearance: Normal appearance. She is well-developed.   HENT:      Head: Normocephalic.   Eyes:      General: Lids are normal.   Neck:      Vascular: Normal carotid pulses. No carotid bruit or JVD.      Trachea: No tracheal tenderness or tracheal deviation.   Cardiovascular:      Rate and Rhythm: Regular rhythm.      Pulses: Normal pulses.      Heart sounds: Normal heart sounds, S1 normal and S2 normal.    No systolic murmur is present.  Pulmonary:      Effort: Pulmonary effort is normal.      Breath sounds: No stridor.   Abdominal:      General: There is no distension.      Palpations: Abdomen is  soft.      Tenderness: There is no abdominal tenderness.   Musculoskeletal:      Cervical back: No edema.   Skin:     General: Skin is warm.   Neurological:      Mental Status: She is alert.      Cranial Nerves: No cranial nerve deficit.      Sensory: No sensory deficit.   Psychiatric:         Speech: Speech normal.         Behavior: Behavior normal.         Thought Content: Thought content normal.          Results Review:    Results for orders placed during the hospital encounter of 20    Adult Transthoracic Echo Complete W/ Cont if Necessary Per Protocol    Interpretation Summary  · Left ventricular ejection fraction appears to be 56 - 60%.  · Left ventricular diastolic function was normal.  · Abnormal global longitudinal LV strain (GLS) = -15.8%.  · No pulmonary hypertension      Mariann Portillo  Holter Monitor 72Hr- (/)  Order# [id number]  Reading physician: Colton Arguelles MD Ordering physician: Colton Arguelles MD Study date: 10/7/20   Patient Information    Patient Name   Mariann Portillo MRN   [id number] Legal Sex   Female  (Age)   1970 (50 y.o.)   Interpretation Summary       · Average HR: 103. Min HR: 68. Max HR: 174.     · Entire report was reviewed.  Monitoring in days: ~14   · The predominant rhythm noted during the testing period was sinus rhythm.     · Rare supraventricular ectopics with an APC burden of: < 1%    · Rare premature ventricular contractions with a PVC burden of: < 1%     · No correlated arrhythmia  · No significant pauses                  Conclusion:      Baseline rhythm is sinus.  No significant ectopy   2 runs of supraventricular tachycardia longest 6 beats at a maximum rate of 174 bpm and an average of 162 bpm           Mariann Portillo  Holter Monitor 72Hr- (/)  Order# [id number]  Reading physician: Colton Arguelles MD Ordering physician: Colton Arguelles MD Study date: 10/7/20   Patient Information    Patient Name   Mariann Portillo MRN   [id number] Sex   Female   (Age)   1970 (50 y.o.)   Interpretation Summary       · Average HR: 103. Min HR: 68. Max HR: 174.     · Entire report was reviewed.  Monitoring in days: ~14   · The predominant rhythm noted during the testing period was sinus rhythm.     · Rare supraventricular ectopics with an APC burden of: < 1%    · Rare premature ventricular contractions with a PVC burden of: < 1%     · No correlated arrhythmia  · No significant pauses                  Conclusion:      Baseline rhythm is sinus.  No significant ectopy   2 runs of supraventricular tachycardia longest 6 beats at a maximum rate of 174 bpm and an average of 162 bpm                ECG 12 Lead    Date/Time: 2023 10:27 AM  Performed by: Colton Arguelles MD  Authorized by: Colton Arguelles MD   Comparison: compared with previous ECG from 2022  Comparison to previous ECG: Ventricular rate changed from 66  To 77  beats per minute    Rhythm: sinus rhythm  Rate: normal  Conduction: conduction normal  ST Segments: ST segments normal  T Waves: T waves normal  QRS axis: normal  Other: no other findings    Clinical impression: normal ECG            Assessment & Plan   Diagnoses and all orders for this visit:    1. POTS (postural orthostatic tachycardia syndrome) (Primary)  -     Ambulatory Referral to Cardiology    2. Paroxysmal SVT (supraventricular tachycardia) (HCC)    3. Palpitations    4. Dizzy spells    5. Fibromyalgia    6. Primary hypertension    7. Shortness of breath    8. Mixed hyperlipidemia  -     CT Cardiac Calcium Score Without Dye; Future    9. Insulin resistance    Other orders  -     fludrocortisone 0.1 MG tablet; Take 2 tablets by mouth Daily.  Dispense: 180 tablet; Refill: 3  -     ECG 12 Lead        Plan    Based on calcium score can consider statin therapy   In past her primary has mentioned to hold off statin as had elevated HDL     Orders Placed This Encounter   Procedures   • CT Cardiac Calcium Score Without Dye     Standing Status:   Future      Standing Expiration Date:   1/6/2024     Order Specific Question:   Patient Pregnant     Answer:   No   • Ambulatory Referral to Cardiology     Referral Priority:   Routine     Referral Type:   Consultation     Referral Reason:   Specialty Services Required     Requested Specialty:   Cardiology     Number of Visits Requested:   1   • ECG 12 Lead     This order was created via procedure documentation     Order Specific Question:   Release to patient     Answer:   Routine Release      Has seen Dr Martinez from neurology [place] but needs to be seen by  autonomic abnormality  Clinic      Escalate Florinef dose   Requested Prescriptions     Signed Prescriptions Disp Refills   • fludrocortisone 0.1 MG tablet 180 tablet 3     Sig: Take 2 tablets by mouth Daily.        Regular sleep and stay well hydrated   Elevate head end of bed 6 inches     Keep f/u pulmonary follow up   Continue monitoring orthostatic blood pressures that she is already doing    Patient expressed understanding  Encouraged and answered all questions   Discussed with the patient and all questioned fully answered. She will call me if any problems arise.   Discussed results of prior testing with patient : echo   as well electrocardiogram from today       I support the patient's decision to take the Covid -19 vaccine   Had required complete course   No major issues   Now fully immunized    Had booster too       Follow up with TASNEEM Carrasco              Return in about 6 months (around 7/6/2023).

## 2023-02-06 ENCOUNTER — HOSPITAL ENCOUNTER (OUTPATIENT)
Dept: GENERAL RADIOLOGY | Facility: HOSPITAL | Age: 53
Discharge: HOME | End: 2023-02-06
Payer: COMMERCIAL

## 2023-02-06 PROCEDURE — 73562 X-RAY EXAM OF KNEE 3: CPT

## 2023-02-17 LAB
ALBUMIN SERPL-MCNC: 4.3 G/DL (ref 3.5–5.2)
ALP BLD-CCNC: 100 U/L (ref 35–104)
ALT SERPL-CCNC: 11 U/L (ref 5–33)
ANION GAP SERPL CALCULATED.3IONS-SCNC: 11 MMOL/L (ref 7–19)
AST SERPL-CCNC: 18 U/L (ref 5–32)
BASOPHILS ABSOLUTE: 0.1 K/UL (ref 0–0.2)
BASOPHILS RELATIVE PERCENT: 1.3 % (ref 0–1)
BILIRUB SERPL-MCNC: 0.4 MG/DL (ref 0.2–1.2)
BUN BLDV-MCNC: 10 MG/DL (ref 6–20)
CALCIUM SERPL-MCNC: 9.7 MG/DL (ref 8.6–10)
CHLORIDE BLD-SCNC: 99 MMOL/L (ref 98–111)
CHOLESTEROL, TOTAL: 240 MG/DL (ref 160–199)
CO2: 28 MMOL/L (ref 22–29)
CREAT SERPL-MCNC: 0.7 MG/DL (ref 0.5–0.9)
EOSINOPHILS ABSOLUTE: 0.3 K/UL (ref 0–0.6)
EOSINOPHILS RELATIVE PERCENT: 3 % (ref 0–5)
GFR SERPL CREATININE-BSD FRML MDRD: >60 ML/MIN/{1.73_M2}
GLUCOSE BLD-MCNC: 98 MG/DL (ref 74–109)
HCT VFR BLD CALC: 42.9 % (ref 37–47)
HDLC SERPL-MCNC: 71 MG/DL (ref 65–121)
HEMOGLOBIN: 14 G/DL (ref 12–16)
IMMATURE GRANULOCYTES #: 0 K/UL
LDL CHOLESTEROL CALCULATED: 142 MG/DL
LYMPHOCYTES ABSOLUTE: 2.9 K/UL (ref 1.1–4.5)
LYMPHOCYTES RELATIVE PERCENT: 33.2 % (ref 20–40)
MCH RBC QN AUTO: 31.1 PG (ref 27–31)
MCHC RBC AUTO-ENTMCNC: 32.6 G/DL (ref 33–37)
MCV RBC AUTO: 95.3 FL (ref 81–99)
MONOCYTES ABSOLUTE: 0.5 K/UL (ref 0–0.9)
MONOCYTES RELATIVE PERCENT: 5.7 % (ref 0–10)
NEUTROPHILS ABSOLUTE: 5 K/UL (ref 1.5–7.5)
NEUTROPHILS RELATIVE PERCENT: 56.5 % (ref 50–65)
PDW BLD-RTO: 12.2 % (ref 11.5–14.5)
PLATELET # BLD: 425 K/UL (ref 130–400)
PMV BLD AUTO: 9.5 FL (ref 9.4–12.3)
POTASSIUM SERPL-SCNC: 4.7 MMOL/L (ref 3.5–5)
RBC # BLD: 4.5 M/UL (ref 4.2–5.4)
SODIUM BLD-SCNC: 138 MMOL/L (ref 136–145)
T4 FREE: 1.13 NG/DL (ref 0.93–1.7)
TOTAL PROTEIN: 7.5 G/DL (ref 6.6–8.7)
TRIGL SERPL-MCNC: 136 MG/DL (ref 0–149)
TSH SERPL DL<=0.05 MIU/L-ACNC: 1.22 UIU/ML (ref 0.27–4.2)
WBC # BLD: 8.8 K/UL (ref 4.8–10.8)

## 2023-02-24 ENCOUNTER — TRANSCRIBE ORDERS (OUTPATIENT)
Dept: ADMINISTRATIVE | Facility: HOSPITAL | Age: 53
End: 2023-02-24
Payer: COMMERCIAL

## 2023-02-24 DIAGNOSIS — M54.50 LOW BACK PAIN, UNSPECIFIED BACK PAIN LATERALITY, UNSPECIFIED CHRONICITY, UNSPECIFIED WHETHER SCIATICA PRESENT: Primary | ICD-10-CM

## 2023-02-24 DIAGNOSIS — M79.605 LEFT LEG PAIN: ICD-10-CM

## 2023-02-24 DIAGNOSIS — M25.562 LEFT KNEE PAIN, UNSPECIFIED CHRONICITY: Primary | ICD-10-CM

## 2023-03-03 ENCOUNTER — HOSPITAL ENCOUNTER (OUTPATIENT)
Dept: CT IMAGING | Facility: HOSPITAL | Age: 53
Discharge: HOME | End: 2023-03-03
Admitting: INTERNAL MEDICINE
Payer: COMMERCIAL

## 2023-03-03 DIAGNOSIS — E78.2 MIXED HYPERLIPIDEMIA: ICD-10-CM

## 2023-03-03 PROCEDURE — 75571 CT HRT W/O DYE W/CA TEST: CPT

## 2023-03-03 PROCEDURE — 75571 CT HRT W/O DYE W/CA TEST: CPT | Performed by: INTERNAL MEDICINE

## 2023-03-08 ENCOUNTER — HOSPITAL ENCOUNTER (OUTPATIENT)
Dept: MRI IMAGING | Facility: HOSPITAL | Age: 53
Discharge: HOME | End: 2023-03-08
Admitting: FAMILY MEDICINE
Payer: COMMERCIAL

## 2023-03-08 DIAGNOSIS — M54.10 RADICULOPATHY, UNSPECIFIED SPINAL REGION: ICD-10-CM

## 2023-03-08 DIAGNOSIS — M54.50 LOW BACK PAIN, UNSPECIFIED BACK PAIN LATERALITY, UNSPECIFIED CHRONICITY, UNSPECIFIED WHETHER SCIATICA PRESENT: ICD-10-CM

## 2023-03-08 DIAGNOSIS — M79.606 PAIN OF LOWER EXTREMITY, UNSPECIFIED LATERALITY: ICD-10-CM

## 2023-03-08 PROCEDURE — 72148 MRI LUMBAR SPINE W/O DYE: CPT

## 2023-05-23 ENCOUNTER — OFFICE VISIT (OUTPATIENT)
Dept: PULMONOLOGY | Facility: CLINIC | Age: 53
End: 2023-05-23
Payer: COMMERCIAL

## 2023-05-23 VITALS
OXYGEN SATURATION: 97 % | WEIGHT: 152 LBS | SYSTOLIC BLOOD PRESSURE: 122 MMHG | HEART RATE: 82 BPM | BODY MASS INDEX: 25.33 KG/M2 | HEIGHT: 65 IN | DIASTOLIC BLOOD PRESSURE: 86 MMHG

## 2023-05-23 DIAGNOSIS — R06.02 SHORTNESS OF BREATH: Primary | ICD-10-CM

## 2023-05-23 NOTE — PROCEDURES
Pulmonary Function Test  Performed by: Дмитрий Duncan CMA  Authorized by: Khanh Mendoza MD      Pre Drug % Predicted    FVC: 97%   FEV1: 96%   FEF 25-75%: 108%   FEV1/FVC: 100%   DLCO: 91%   D/VAsb: 103%    Interpretation   Spirometry   Spirometry shows normal results. midflow is normal.  Diffusion Capacity  The patient's diffusion capacity is normal.  Diffusion capacity is normal when corrected for alveolar volume.   Overall comments: Results improved compared with prior studies  Electronically signed by Khanh Mendoza MD, 5/23/2023, 17:06 CDT

## 2023-05-23 NOTE — PROGRESS NOTES
Background:  Pt w multiple lung nodules 2018, asthma   Chief Complaint  Shortness of Breath    Subjective    History of Present Illness     Mariann Portillo is here for follow up with [facility].  History of Present Illness  Pt follows up with history of dyspnea suggestive of mild asthma and lung nodules which on follow up imaging suggested fibrotic foci.  She has no acute complaints.  She was walking 2 miles a day and has had less exertional tolerance related to POTS   Tobacco Use: Low Risk     Smoking Tobacco Use: Never    Smokeless Tobacco Use: Never    Passive Exposure: Not on file      Current Outpatient Medications   Medication Instructions    albuterol sulfate  (90 Base) MCG/ACT inhaler 2 puffs, Inhalation, Every 4 Hours, Rinse mouth after use.    atenolol (TENORMIN) 25 MG tablet TAKE 1 TABLET BY MOUTH ONCE DAILY    Eucrisa 2 % ointment APPLY ON THE SKIN TWICE DAILY    fludrocortisone 0.2 mg, Oral, Daily    imipramine (TOFRANIL) 25 mg, Oral, Nightly    Melatonin 10 MG tablet Oral, As Needed    metFORMIN (GLUCOPHAGE) 850 mg, 3 Times Daily    metoprolol tartrate (LOPRESSOR) 25 mg, Oral, 2 Times Daily    midodrine (PROAMATINE) 10 mg, Oral, 3 Times Daily Before Meals    mometasone (ELOCON) 0.1 % cream 1 application, Topical, Daily    nitrofurantoin (MACRODANTIN) 100 mg, Oral, As Needed    ondansetron ODT (ZOFRAN-ODT) 8 mg, Translingual, Every 8 Hours PRN    PB-Hyoscy-Atropine-Scopolamine (DONNATAL) 16.2 MG per tablet As Needed    phenazopyridine (PYRIDIUM) 100 mg, Oral, 3 Times Daily PRN    pimecrolimus (ELIDEL) 1 % cream TRINITY ON THE SKIN BID    pyridostigmine (MESTINON) 60 mg, Oral, 2 Times Daily    therapeutic multivitamin-minerals (THERAGRAN-M) tablet 1 tablet, Oral    vitamin B-12 (CYANOCOBALAMIN) 1,000 mcg, Oral    vitamin D (ERGOCALCIFEROL) 24532 units capsule capsule Every 7 Days    zolpidem CR (AMBIEN CR) 6.25 MG CR tablet Nightly      Objective    "  Vital Signs:   /86   Pulse 82   Ht 165.1 cm (65\")   Wt 68.9 kg (152 lb)   SpO2 97% Comment: RA  BMI 25.29 kg/m²   Physical Exam  Constitutional:       General: She is not in acute distress.     Appearance: She is well-developed. She is not ill-appearing or toxic-appearing.   HENT:      Head: Atraumatic.   Eyes:      General: No scleral icterus.     Conjunctiva/sclera: Conjunctivae normal.   Cardiovascular:      Rate and Rhythm: Normal rate and regular rhythm.      Heart sounds: S1 normal and S2 normal.   Pulmonary:      Effort: Pulmonary effort is normal.      Breath sounds: Normal breath sounds.   Abdominal:      General: There is no distension.   Musculoskeletal:         General: No deformity.      Cervical back: Neck supple.   Skin:     Coloration: Skin is not pale.      Findings: No rash.   Neurological:      Mental Status: She is alert.      Result Review  Data Reviewed:    CT CHEST WO CONTRAST (11/12/2021 17:08 EST)    The scattered areas of nodular infiltrate is stable and   represent chronic inflammatory process. No discrete lung nodules with   features of lung malignancy is noted. No further follow-up for these   nodules is warranted.       PFT Values          11/18/2021    16:00 5/19/2022    15:30 5/23/2023    16:00   Pre Drug PFT Results   FVC 95 80 97   FEV1 93 85 96   FEF 25-75% 97 118 108   FEV1/FVC 80.68 85 100   Other Tests PFT Results   DLCO 85  91   D/VAsb 97  103                Assessment and Plan      Diagnoses and all orders for this visit:    1. Shortness of breath (Primary)  -     XR Chest 2 View; Future  -     Pulmonary Function Test    She appears well  Will follow up PFT and cxr. Call for acute complaints  Recheck cxr to reevaluate infiltrates from before, felt to be benign.    Follow Up     No follow-ups on file.  Patient was given instructions and counseling regarding her condition or for health maintenance advice. Please see specific information pulled into the AVS if " appropriate.    Electronically signed by Khanh Mendoza MD, 5/23/2023, 21:56 CDT

## 2023-06-06 ENCOUNTER — HOSPITAL ENCOUNTER (OUTPATIENT)
Dept: WOMENS IMAGING | Age: 53
Discharge: HOME | End: 2023-06-06
Payer: COMMERCIAL

## 2023-06-06 VITALS — WEIGHT: 151 LBS | BODY MASS INDEX: 24.37 KG/M2

## 2023-06-06 DIAGNOSIS — Z12.31 ENCOUNTER FOR SCREENING MAMMOGRAM FOR MALIGNANT NEOPLASM OF BREAST: ICD-10-CM

## 2023-06-06 PROCEDURE — 77067 SCR MAMMO BI INCL CAD: CPT | Performed by: RADIOLOGY

## 2023-06-06 PROCEDURE — 77063 BREAST TOMOSYNTHESIS BI: CPT

## 2023-06-06 PROCEDURE — 77063 BREAST TOMOSYNTHESIS BI: CPT | Performed by: RADIOLOGY

## 2023-06-12 ENCOUNTER — TELEPHONE (OUTPATIENT)
Dept: CARDIOLOGY | Facility: CLINIC | Age: 53
End: 2023-06-12
Payer: COMMERCIAL

## 2023-06-12 NOTE — TELEPHONE ENCOUNTER
Beba with patients PCP (Dr.Aribbe Reese) has called to confirm if based on patients CT Cardiac Calcium Score on 03/03/2023 if patient should re-start her statin therapy in our opinion.     Please advise.    Thank you  WF

## 2023-06-13 NOTE — TELEPHONE ENCOUNTER
I've returned call to patients PCP, Per Beba they will resume statin therapy for patient.    Thanks  WF

## 2023-06-15 NOTE — PROGRESS NOTES
Refill Decision Note   Magda Narendra  Subjective   Mariann Portillo is a 48 y.o. female is here for sinus infection.     Has had chronic hoarseness and been doing voice therapy.  Was told to avoid steroids due to worsening vocal chord problem.      URI    This is a new problem. The current episode started in the past 7 days (4 days). The problem has been gradually worsening (started with drainage 5 days ago, then 2 days ago sore throat, sinus pressure and hoarseness developed). There has been no fever. Associated symptoms include congestion (yellow and green mucous), ear pain (both), rhinorrhea, sinus pain (hurts in teeth a little bit) and a sore throat. Pertinent negatives include no abdominal pain, chest pain, coughing, diarrhea, dysuria, headaches, joint pain, joint swelling, nausea, neck pain, plugged ear sensation, rash, sneezing, swollen glands, vomiting or wheezing. She has tried nothing for the symptoms.       The following portions of the patient's history were reviewed and updated as appropriate: allergies, current medications, past family history, past medical history, past social history, past surgical history and problem list.    Review of Systems   Constitutional: Negative for activity change, appetite change, fatigue and fever.   HENT: Positive for congestion (yellow and green mucous), ear pain (both), rhinorrhea, sinus pain (hurts in teeth a little bit), sore throat and voice change (has gotten more hoarse). Negative for sneezing and trouble swallowing.    Eyes: Negative for discharge and redness.   Respiratory: Negative for cough and wheezing.    Cardiovascular: Negative for chest pain and leg swelling.   Gastrointestinal: Negative for abdominal pain, diarrhea, nausea and vomiting.   Genitourinary: Negative for dysuria.   Musculoskeletal: Negative for joint pain and neck pain.   Skin: Negative for rash.   Allergic/Immunologic: Positive for environmental allergies. Negative for food allergies and immunocompromised state.   Neurological:  Negative for headaches.   Hematological: Negative for adenopathy.       Objective   Physical Exam   Constitutional: She is oriented to person, place, and time. She appears well-developed and well-nourished. No distress.   HENT:   Head: Normocephalic and atraumatic.   Right Ear: External ear normal.   Left Ear: External ear normal.   Nose: Nose normal.   Mouth/Throat: No oropharyngeal exudate (erythema and post-nasal drainage noted).   TM's WNL bilaterally.  Bilateral maxillary sinus tenderness. Non-vocal due to hoarseness, is writing to communicate.   Eyes: Conjunctivae and EOM are normal. Pupils are equal, round, and reactive to light. Right eye exhibits no discharge. Left eye exhibits no discharge. No scleral icterus.   Neck: Normal range of motion. Neck supple. No JVD present. No tracheal deviation present. No thyromegaly present.   Cardiovascular: Normal rate and regular rhythm.   Pulmonary/Chest: Effort normal and breath sounds normal. No stridor. No respiratory distress. She has no wheezes. She has no rales.   Abdominal: Soft. She exhibits no distension. There is no tenderness.   Musculoskeletal: Normal range of motion. She exhibits no edema, tenderness or deformity.   Lymphadenopathy:     She has no cervical adenopathy.   Neurological: She is alert and oriented to person, place, and time. She exhibits normal muscle tone. Coordination normal.   Skin: Skin is warm and dry. No rash noted. She is not diaphoretic. No erythema. No pallor.   Psychiatric: She has a normal mood and affect. Her behavior is normal. Judgment and thought content normal.   Nursing note and vitals reviewed.      Assessment/Plan   Mariann was seen today for sinusitis.    Diagnoses and all orders for this visit:    Acute URI    Acute maxillary sinusitis, recurrence not specified    Other orders  -     amoxicillin (AMOXIL) 875 MG tablet; Take 1 tablet by mouth 2 (Two) Times a Day.  -     guaiFENesin (MUCINEX) 600 MG 12 hr tablet; Take 2 tablets  by mouth 2 (Two) Times a Day. Take with full glass of water.       Decided on Amoxil as a first choice because she tends to get upset stomach with meds, and she has taken this before without problems.  I've asked them to let me know if sx worsen or do not improve in 4-5 days.

## 2023-07-11 PROBLEM — I25.10 CORONARY ARTERY CALCIFICATION SEEN ON CT SCAN: Status: ACTIVE | Noted: 2023-07-11

## 2023-07-21 ENCOUNTER — HOSPITAL ENCOUNTER (OUTPATIENT)
Dept: CT IMAGING | Facility: HOSPITAL | Age: 53
Discharge: HOME | End: 2023-07-21
Payer: COMMERCIAL

## 2023-07-21 DIAGNOSIS — R93.89 ABNORMAL FINDINGS ON DIAGNOSTIC IMAGING OF CARDIOVASCULAR SYSTEM: ICD-10-CM

## 2023-07-21 DIAGNOSIS — R93.89 ABNORMAL FINDINGS ON DIAGNOSTIC IMAGING OF CARDIOVASCULAR SYSTEM: Primary | ICD-10-CM

## 2023-07-21 PROCEDURE — 0502T HC CORONARY FFR DERIVED CTA DATA PREP & TRANSMIS: CPT

## 2023-07-21 PROCEDURE — 0503T HC CORONARY FFR CTA DATA ALYS & GNRJ ESTIMATED FFR MODEL: CPT

## 2023-07-27 ENCOUNTER — TELEPHONE (OUTPATIENT)
Dept: CARDIOLOGY | Facility: CLINIC | Age: 53
End: 2023-07-27
Payer: COMMERCIAL

## 2023-07-27 NOTE — TELEPHONE ENCOUNTER
[place] @ Dr. Reese's office is calling in today stating patient had CT Angiogram done and Dr. Reese is wanting to know a plan for the patient moving forward? Please advise      Phone: [phone]

## 2023-07-28 NOTE — TELEPHONE ENCOUNTER
Taniya Miner N, APRN  You4 hours ago (11:21 AM)     We are waiting for additional test results and will discuss the plan at her follow up on 8/21 when we have all the results.         Left detailed message on the Nurse Line @ Dr. Reese's office

## 2023-08-29 ENCOUNTER — OFFICE VISIT (OUTPATIENT)
Dept: CARDIOLOGY | Facility: CLINIC | Age: 53
End: 2023-08-29
Payer: COMMERCIAL

## 2023-08-29 VITALS
HEART RATE: 89 BPM | BODY MASS INDEX: 25.16 KG/M2 | WEIGHT: 151 LBS | SYSTOLIC BLOOD PRESSURE: 100 MMHG | DIASTOLIC BLOOD PRESSURE: 70 MMHG | OXYGEN SATURATION: 98 % | HEIGHT: 65 IN

## 2023-08-29 DIAGNOSIS — I25.10 NON-OCCLUSIVE CORONARY ARTERY DISEASE: ICD-10-CM

## 2023-08-29 DIAGNOSIS — G90.A POTS (POSTURAL ORTHOSTATIC TACHYCARDIA SYNDROME): ICD-10-CM

## 2023-08-29 DIAGNOSIS — I10 PRIMARY HYPERTENSION: ICD-10-CM

## 2023-08-29 DIAGNOSIS — I47.1 PAROXYSMAL SVT (SUPRAVENTRICULAR TACHYCARDIA): Primary | ICD-10-CM

## 2023-08-29 PROCEDURE — 99214 OFFICE O/P EST MOD 30 MIN: CPT | Performed by: NURSE PRACTITIONER

## 2023-08-29 RX ORDER — ASPIRIN 81 MG/1
81 TABLET ORAL DAILY
Qty: 30 TABLET | Refills: 11 | Status: SHIPPED | OUTPATIENT
Start: 2023-08-29

## 2023-08-29 RX ORDER — ATORVASTATIN CALCIUM 10 MG/1
10 TABLET, FILM COATED ORAL
COMMUNITY
Start: 2023-08-12

## 2023-10-12 NOTE — PROGRESS NOTES
"Chief Complaint  PSVT    Subjective          Mariann Portillo presents to [facility]ARDIOLOGY for routine follow-up of outpatient testing.  CT angiogram of the coronary arteries on 1/21/2023 revealed coronary calcium score 407.2 with 30 to 40% stenosis of the mid LAD and 50% stenosis of the mid RCA.  CT FFR revealed normal flow with no evidence of any hemodynamically significant stenosis of epicardial coronary arteries.  She has paroxysmal supraventricular tachycardia, POTS and hypertension. She complains of increased shortness of breath, which she relates to hypotension related to POTS. She continues to report intermittent palpitations as well. She continues to report intermittent dizziness. Patient denies chest pain, syncope, orthopnea, PND, edema or decreased stamina.  Patient denies any signs of bleeding.    Hypertension  This is a chronic problem. The current episode started more than 1 year ago. The problem is controlled. Associated symptoms include palpitations and shortness of breath. Pertinent negatives include no anxiety, blurred vision, chest pain, headaches, malaise/fatigue, neck pain, orthopnea, peripheral edema, PND or sweats. Risk factors for coronary artery disease include dyslipidemia. Current antihypertension treatment includes beta blockers. The current treatment provides significant improvement.     I have reviewed and confirmed the accuracy of the ROS as documented by the MA/LPN/RN TASNEEM Luis      Objective   Vital Signs:   /70   Pulse 89   Ht 165.1 cm (65\")   Wt 68.5 kg (151 lb)   SpO2 98%   BMI 25.13 kg/mý     Vitals and nursing note reviewed.   Constitutional:       General: Not in acute distress.     Appearance: Normal and healthy appearance. Well-developed, overweight and not in distress. Not diaphoretic.   Eyes:      General: Lids are normal.         Right eye: No discharge.         Left eye: No discharge.      Conjunctiva/sclera: Conjunctivae " Victoria Crowley,    Patient states he is waiting a urgent care for a urine test  Per patient there is a 2 hour wait    He spoke with Dr Reed office    They wanted to rule out that his symptoms are not related to mounjaro    Please see telephone encounter from today    He sates you are his primary and wanting to know if you would order a urine test.    It looks like from there message they are wanting him to be seen in urgent care vs just giving a urine sample    Patient is waiting at urgent care now and hoping you could \"just order urine test\"    Please advise        normal.      Pupils: Pupils are equal, round, and reactive to light.   HENT:      Head: Normocephalic and atraumatic.      Jaw: There is normal jaw occlusion.      Right Ear: External ear normal.      Left Ear: External ear normal.      Nose: Nose normal.   Neck:      Thyroid: No thyromegaly.      Vascular: No carotid bruit, JVD or JVR. JVD normal.      Trachea: Trachea normal. No tracheal deviation.   Pulmonary:      Effort: Pulmonary effort is normal. No respiratory distress.      Breath sounds: Normal breath sounds. No decreased breath sounds. No wheezing. No rhonchi. No rales.   Chest:      Chest wall: Not tender to palpatation.   Cardiovascular:      PMI at left midclavicular line. Normal rate. Regular rhythm. Normal S1. Normal S2.       Murmurs: There is no murmur.      No gallop.  No click. No rub.   Pulses:     Intact distal pulses. No decreased pulses.   Edema:     Peripheral edema absent.   Abdominal:      General: Bowel sounds are normal. There is no distension.      Palpations: Abdomen is soft.      Tenderness: There is no abdominal tenderness.   Musculoskeletal: Normal range of motion.         General: No tenderness or deformity.      Cervical back: Normal range of motion and neck supple. Skin:     General: Skin is warm and dry.      Coloration: Skin is not pale.      Findings: No erythema or rash.   Neurological:      General: No focal deficit present.      Mental Status: Alert, oriented to person, place, and time and oriented to person, place and time.   Psychiatric:         Attention and Perception: Attention and perception normal.         Mood and Affect: Mood and affect normal.         Speech: Speech normal.         Behavior: Behavior normal.         Thought Content: Thought content normal.         Cognition and Memory: Cognition and memory normal.         Judgment: Judgment normal.      Result Review :   The following data was reviewed by: TASNEEM Luis on 06/08/2022:  Common labs           10/25/2022    14:34 2/17/2023    16:32 7/20/2023    14:36   Common Labs   Creatinine 0.70   0.66    WBC  8.8        Hemoglobin  14.0        Hematocrit  42.9        Platelets  425        Total Cholesterol  240        Triglycerides  136        HDL Cholesterol  71        LDL Cholesterol   142           Details          This result is from an external source.             Data reviewed: Cardiology studies holter monitor 11/8/20, 2d echo 11/4/20 and CT angiogram of the coronary arteries 7/21/2023         Assessment and Plan    Diagnoses and all orders for this visit:    1. Paroxysmal SVT (supraventricular tachycardia) (HCC) (Primary)-2 runs with longest duration of 6 beats on 14-day Holter monitor 10/7/2020.  Stable.  Continue atenolol.    2. POTS (postural orthostatic tachycardia syndrome)- no recent syncopal episodes.  Stable.  Continue midodrine.    3. Primary hypertension-blood pressures are well controlled.  Continue atenolol.  Monitor and record daily blood pressure. Report readings consistently higher than 130/80 or consistently lower than 100/60.     4.  Nonocclusive coronary artery disease-30 to 40% mid LAD and 50% mid RCA stenosis on CT angiogram of the coronary arteries 7/21/2023.  Normal CT FFR.  Pt is having lipid panel drawn per PCP today.  Start aspirin 81 mg daily.     Follow Up   Return in about 6 months (around 2/29/2024) for Next scheduled follow up.  Patient was given instructions and counseling regarding her condition or for health maintenance advice. Please see specific information pulled into the AVS if appropriate.

## 2024-02-29 RX ORDER — FLUDROCORTISONE ACETATE 0.1 MG/1
0.2 TABLET ORAL DAILY
Qty: 60 TABLET | Refills: 3 | Status: SHIPPED | OUTPATIENT
Start: 2024-02-29 | End: 2024-03-04 | Stop reason: SDUPTHER

## 2024-03-04 ENCOUNTER — OFFICE VISIT (OUTPATIENT)
Dept: CARDIOLOGY | Facility: CLINIC | Age: 54
End: 2024-03-04
Payer: COMMERCIAL

## 2024-03-04 VITALS
BODY MASS INDEX: 24.43 KG/M2 | HEART RATE: 74 BPM | SYSTOLIC BLOOD PRESSURE: 107 MMHG | DIASTOLIC BLOOD PRESSURE: 73 MMHG | WEIGHT: 152 LBS | OXYGEN SATURATION: 99 % | HEIGHT: 66 IN

## 2024-03-04 DIAGNOSIS — I25.10 NON-OCCLUSIVE CORONARY ARTERY DISEASE: ICD-10-CM

## 2024-03-04 DIAGNOSIS — I47.10 PAROXYSMAL SVT (SUPRAVENTRICULAR TACHYCARDIA): Primary | ICD-10-CM

## 2024-03-04 DIAGNOSIS — G90.A POTS (POSTURAL ORTHOSTATIC TACHYCARDIA SYNDROME): ICD-10-CM

## 2024-03-04 DIAGNOSIS — I10 PRIMARY HYPERTENSION: ICD-10-CM

## 2024-03-04 PROCEDURE — 99214 OFFICE O/P EST MOD 30 MIN: CPT | Performed by: NURSE PRACTITIONER

## 2024-03-04 PROCEDURE — 93000 ELECTROCARDIOGRAM COMPLETE: CPT | Performed by: NURSE PRACTITIONER

## 2024-03-04 RX ORDER — MOMETASONE FUROATE 1 MG/ML
SOLUTION TOPICAL AS NEEDED
COMMUNITY

## 2024-03-04 RX ORDER — MOMETASONE FUROATE 1 MG/G
1 CREAM TOPICAL AS NEEDED
COMMUNITY

## 2024-03-04 RX ORDER — FLUDROCORTISONE ACETATE 0.1 MG/1
0.2 TABLET ORAL DAILY
Qty: 60 TABLET | Refills: 3 | Status: SHIPPED | OUTPATIENT
Start: 2024-03-04

## 2024-03-04 NOTE — PROGRESS NOTES
"Chief Complaint  PSVT (6mo F/U), POTS, and Non-occlusive CAD    Subjective          Mariann Portillo presents to [facility] CARDIOLOGY for routine follow-up. She has paroxysmal supraventricular tachycardia, POTS and hypertension. She complains of increased shortness of breath, which she relates to hypotension related to POTS. She continues to report intermittent palpitations as well. She continues to report intermittent dizziness. Patient denies chest pain, syncope, orthopnea, PND, edema or decreased stamina.  Patient denies any signs of bleeding.    Hypertension  This is a chronic problem. The current episode started more than 1 year ago. The problem is controlled. Associated symptoms include palpitations and shortness of breath. Pertinent negatives include no anxiety, blurred vision, chest pain, headaches, malaise/fatigue, neck pain, orthopnea, peripheral edema, PND or sweats. Risk factors for coronary artery disease include dyslipidemia. Current antihypertension treatment includes beta blockers. The current treatment provides significant improvement.     I have reviewed and confirmed the accuracy of the ROS  TASNEEM Luis      Objective   Vital Signs:   /73   Pulse 74   Ht 167.6 cm (66\")   Wt 68.9 kg (152 lb)   SpO2 99%   BMI 24.53 kg/m²     Vitals and nursing note reviewed.   Constitutional:       General: Not in acute distress.     Appearance: Normal and healthy appearance. Well-developed, overweight and not in distress. Not diaphoretic.   Eyes:      General: Lids are normal.         Right eye: No discharge.         Left eye: No discharge.      Conjunctiva/sclera: Conjunctivae normal.      Pupils: Pupils are equal, round, and reactive to light.   HENT:      Head: Normocephalic and atraumatic.      Jaw: There is normal jaw occlusion.      Right Ear: External ear normal.      Left Ear: External ear normal.      Nose: Nose normal.   Neck:      Thyroid: No thyromegaly.      " Vascular: No carotid bruit, JVD or JVR. JVD normal.      Trachea: Trachea normal. No tracheal deviation.   Pulmonary:      Effort: Pulmonary effort is normal. No respiratory distress.      Breath sounds: Normal breath sounds. No decreased breath sounds. No wheezing. No rhonchi. No rales.   Chest:      Chest wall: Not tender to palpatation.   Cardiovascular:      PMI at left midclavicular line. Normal rate. Regular rhythm. Normal S1. Normal S2.       Murmurs: There is no murmur.      No gallop.  No click. No rub.   Pulses:     Intact distal pulses. No decreased pulses.   Edema:     Peripheral edema absent.   Abdominal:      General: Bowel sounds are normal. There is no distension.      Palpations: Abdomen is soft.      Tenderness: There is no abdominal tenderness.   Musculoskeletal: Normal range of motion.         General: No tenderness or deformity.      Cervical back: Normal range of motion and neck supple. Skin:     General: Skin is warm and dry.      Coloration: Skin is not pale.      Findings: No erythema or rash.   Neurological:      General: No focal deficit present.      Mental Status: Alert, oriented to person, place, and time and oriented to person, place and time.   Psychiatric:         Attention and Perception: Attention and perception normal.         Mood and Affect: Mood and affect normal.         Speech: Speech normal.         Behavior: Behavior normal.         Thought Content: Thought content normal.         Cognition and Memory: Cognition and memory normal.         Judgment: Judgment normal.        Result Review :   The following data was reviewed by: TASNEEM Luis on 03/04/2024:  Common labs          7/20/2023    14:36   Common Labs   Creatinine 0.66      Data reviewed: Cardiology studies holter monitor 11/8/20, 2d echo 11/4/20 and CT angiogram of the coronary arteries 7/21/2023    ECG 12 Lead    Date/Time: 3/4/2024 3:22 PM  Performed by: Taniya Miner APRN    Authorized by: Kristofer  TASNEEM Johnson  Comparison: compared with previous ECG from 7/12/2023  Similar to previous ECG  Rhythm: sinus rhythm  Rate: normal  BPM: 74  QRS axis: normal    Clinical impression: normal ECG            Assessment and Plan    Diagnoses and all orders for this visit:    1. Paroxysmal SVT (supraventricular tachycardia) (HCC) (Primary)-2 runs with longest duration of 6 beats on 14-day Holter monitor 10/7/2020.  Stable.  Continue atenolol.    2. POTS (postural orthostatic tachycardia syndrome)- no recent syncopal episodes.  Stable.  Continue midodrine for now. Pt is scheduled with autonomic clinic in [place] within the next few months.     3. Primary hypertension-blood pressures are well controlled.  Continue atenolol.  Monitor and record daily blood pressure. Report readings consistently higher than 130/80 or consistently lower than 100/60.     4.  Nonocclusive coronary artery disease-30 to 40% mid LAD and 50% mid RCA stenosis on CT angiogram of the coronary arteries 7/21/2023.  Normal CT FFR. Continue aspirin.     Follow Up   Return in about 6 months (around 9/4/2024) for Next scheduled follow up.  Patient was given instructions and counseling regarding her condition or for health maintenance advice. Please see specific information pulled into the AVS if appropriate.

## 2024-03-21 ENCOUNTER — TRANSCRIBE ORDERS (OUTPATIENT)
Dept: ADMINISTRATIVE | Age: 54
End: 2024-03-21

## 2024-03-21 DIAGNOSIS — Z12.31 ENCOUNTER FOR SCREENING MAMMOGRAM FOR MALIGNANT NEOPLASM OF BREAST: Primary | ICD-10-CM

## 2024-04-21 NOTE — PROGRESS NOTES
Kike Olivarez Jr, MD     FOLLOW UP NOTE     Chief Complaint   Patient presents with   • Follow-up        HISTORY OF PRESENT ILLNESS:   Accompanied by:    Mariann Portillo is a  49 y.o. female who is here for follow up. She has become acutely ill last Monday. Blood green mucus from nose and throat.  She has a scratchy throat. She has had voice improve some.  She feels strain with talking.  Fever- none.  No vomiting, just nausea. Weight was going up.  Flonase burns throat.    Review of Systems  Reviewed per patient intake note and confirmed by me    Past History:  Past medical and surgical history, family history and social history reviewed and updated when appropriate.  Current medications and allergies reviewed and updated when appropriate.  Allergies:  Decongestant [pseudoephedrine]; Codimal-a [brompheniramine]; Levaquin [levofloxacin]; and Percocet [oxycodone-acetaminophen]        Vital Signs:   Temp:  [97.6 °F (36.4 °C)] 97.6 °F (36.4 °C)  Heart Rate:  [80] 80  BP: (112)/(88) 112/88    EXAMINATION:   CONSTITUTIONAL:   well nourished, well-developed, alert, oriented, in no acute distress   normal weight for height  COMMUNICATION AND VOICE:   Poor voice but better since last visit  HEAD:   atraumatic  structure normal, no tenderness on palpation, no lesions, no evidence of trauma  SALIVARY GLANDS: parotid glands with no tenderness, no swelling, no masses, submandibular glands with normal size, nontender  BILATERAL: intact  EYES: ocular motility normal, eyelids normal, orbits normal, no proptosis, conjunctiva normal, sclera non-icteric, pupils equal, round   Color- brown   HEARING: Response to conversational voice normal bilaterally  EARS:  external ears normal to inspection and palpation, no tenderness or erythema  canals clear, normal cerumen and skin, without drainage, skin intact and healthy  tympanic membranes clear, normal landmarks and light reflex  normal light reflex and landmarks  middle ear without  fluid, ossicular chain intact  no mastoid process tenderness    NOSE:  APPEARANCE: normal, straight, with good projection, no tenderness, no lesions, no tenderness  NOSE INTERNAL:  ABNORMAL: pale, mod edema of mucosa, turbinates peal and mildly enlarged, Septum wide with DNS CL to R low mild  ORAL CAVITY:  LIPS: structure normal, no tenderness on palpation, no lesions, no evidence of trauma, normal mobility and oral competence  TEETH: dentition within normal limits for age  GUMS: gingivae healthy, no lesions  ORAL MUCOSA: oral mucosa normal, no mucosal lesions  FLOOR OF MOUTH: Warthin's duct patent, mucosa normal  TONGUE: lingual mucosa normal without lesions, normal tongue mobility  OROPHARYNX: oropharyngeal mucosa normal, tonsil fossa with normal appearance  bilateral  NECK: normal appearance, no masses, no lesions, larynx normal mobility, trachea midline  LYMPH NODES:  no adenopathy  THYROID: no overt thyromegaly, no tenderness, nodules or mass present on palpation, position midline   CHEST/RESPIRATORY: respiratory effort normal, no rales, rubs or wheezing  CARDIOVASCULAR: regular rate and rhythm, no murmurs, gallups, no peripheral edema  NEUROLOGIC/PSYCHIATRIC: oriented appropriately for age, mood normal, affect appropriate, cranial nerves intact grossly unless specifically mentioned      VOICE: VOICE QUALITY:     hoarse-  moderate    raspy-  mild    breathy-  mild    weak-  mild-mod    sentence length: normal    intensity: very low    RESULTS REVIEW:    I have personally reviewed the patient's ct of the sinus images.  I have personally reviewed the patient's ct scan of the sinuses without contrast report.          ASSESSMENT:      Diagnosis Plan   1. Dysphonia      Mildly improved   2. Voice strain      Severe   3. Chronic sinusitis, unspecified location      None on CT   4. Autonomic dysfunction     5. Post-nasal drip     6. Viral upper respiratory tract infection      Acute but improving           PLAN:    Conservative management.  With referral for voice  Patient has slightly improved but strained voice today. I will refer to [facility] for voice evaluation and SPEECH LANGU[employer] PATHOLOGY recommendations. She is not breaking strain.  She appears to be resolving URI.  I will treat as URI.  Flonase burns. So I will try Nsacort  Nasacort trial BID  Zofran 4 mg for nausea  Nasal saline  Refer to [facility] for voice evaluation  Reviewed My Chart- activated  New Medications Ordered This Visit   Medications   • ondansetron (ZOFRAN) 4 MG tablet     Sig: Take 1 tablet by mouth Every 8 (Eight) Hours As Needed for Nausea or Vomiting.     Dispense:  18 tablet     Refill:  1   • Triamcinolone Acetonide (NASACORT) 55 MCG/ACT nasal inhaler     Si sprays into the nostril(s) as directed by provider 2 (Two) Times a Day.     Dispense:  2 bottle     Refill:  3          Patient understand(s) and agree(s) with the treatment plan as described.    Return after [facility] evaluation, for Recheck voice.     Kike Olivarez Jr, MD  10/08/19  11:09 AM   ambulatory

## 2024-05-27 PROBLEM — J45.20 MILD INTERMITTENT ASTHMA WITHOUT COMPLICATION: Status: ACTIVE | Noted: 2024-05-27

## 2024-07-01 RX ORDER — ATENOLOL 25 MG/1
TABLET ORAL
Qty: 90 TABLET | Refills: 3 | Status: SHIPPED | OUTPATIENT
Start: 2024-07-01

## 2024-07-09 ENCOUNTER — OFFICE VISIT (OUTPATIENT)
Dept: PULMONOLOGY | Facility: CLINIC | Age: 54
End: 2024-07-09
Payer: COMMERCIAL

## 2024-07-09 ENCOUNTER — HOSPITAL ENCOUNTER (OUTPATIENT)
Dept: GENERAL RADIOLOGY | Facility: HOSPITAL | Age: 54
Discharge: HOME | End: 2024-07-09
Admitting: INTERNAL MEDICINE
Payer: COMMERCIAL

## 2024-07-09 VITALS
SYSTOLIC BLOOD PRESSURE: 116 MMHG | RESPIRATION RATE: 16 BRPM | WEIGHT: 150 LBS | OXYGEN SATURATION: 99 % | DIASTOLIC BLOOD PRESSURE: 70 MMHG | HEIGHT: 66 IN | HEART RATE: 72 BPM | BODY MASS INDEX: 24.11 KG/M2

## 2024-07-09 DIAGNOSIS — G90.A POTS (POSTURAL ORTHOSTATIC TACHYCARDIA SYNDROME): ICD-10-CM

## 2024-07-09 DIAGNOSIS — J45.20 MILD INTERMITTENT ASTHMA WITHOUT COMPLICATION: Primary | ICD-10-CM

## 2024-07-09 DIAGNOSIS — J45.20 MILD INTERMITTENT ASTHMA WITHOUT COMPLICATION: ICD-10-CM

## 2024-07-09 DIAGNOSIS — I47.10 PAROXYSMAL SVT (SUPRAVENTRICULAR TACHYCARDIA): ICD-10-CM

## 2024-07-09 PROCEDURE — 99214 OFFICE O/P EST MOD 30 MIN: CPT | Performed by: INTERNAL MEDICINE

## 2024-07-09 PROCEDURE — 94010 BREATHING CAPACITY TEST: CPT | Performed by: INTERNAL MEDICINE

## 2024-07-09 PROCEDURE — 71046 X-RAY EXAM CHEST 2 VIEWS: CPT

## 2024-07-09 NOTE — PROGRESS NOTES
"Background:  Pt w multiple lung nodules 2018, asthma, POTS   Chief Complaint  Shortness of Breath    Subjective    History of Present Illness     Mariann Portillo is here for follow up with [facility].  History of Present Illness  POTS is still not well controlled.  No asthma exacerbations.  No need for maintenance tx.       Tobacco Use: Low Risk  (7/9/2024)    Patient History     Smoking Tobacco Use: Never     Smokeless Tobacco Use: Never     Passive Exposure: Past      Current Outpatient Medications   Medication Instructions    albuterol sulfate  (90 Base) MCG/ACT inhaler 2 puffs, Inhalation, Every 4 Hours, Rinse mouth after use.    aspirin 81 mg, Oral, Daily    atenolol (TENORMIN) 25 MG tablet TAKE 1 TABLET BY MOUTH ONCE DAILY    atorvastatin (LIPITOR) 10 mg, Oral, Every Night at Bedtime    fludrocortisone 0.2 mg, Oral, Daily    imipramine (TOFRANIL) 50 mg, Oral, Nightly    loratadine (CLARITIN) 10 mg, Oral, Daily    Melatonin 10 MG tablet Oral, As Needed    metFORMIN (GLUCOPHAGE) 850 mg, 3 Times Daily    midodrine (PROAMATINE) 10 mg, Oral, 3 Times Daily Before Meals    mometasone (ELOCON) 0.1 % cream 1 Application, Topical, As Needed    mometasone (ELOCON) 0.1 % solution Topical, As Needed    nitrofurantoin (MACRODANTIN) 100 mg, Oral, As Needed    PB-Hyoscy-Atropine-Scopolamine (DONNATAL) 16.2 MG per tablet As Needed    phenazopyridine (PYRIDIUM) 100 mg, Oral, 3 Times Daily PRN    pimecrolimus (ELIDEL) 1 % cream TRINITY ON THE SKIN BID    pyridostigmine (MESTINON) 180 mg, Oral, Daily    therapeutic multivitamin-minerals (THERAGRAN-M) tablet 1 tablet, Oral    vitamin B-12 (CYANOCOBALAMIN) 500 mcg, Oral    vitamin D (ERGOCALCIFEROL) 08273 units capsule capsule Every 7 Days    zolpidem CR (AMBIEN CR) 6.25 MG CR tablet Nightly      Objective     Vital Signs:   /70   Pulse 72   Resp 16   Ht 167.6 cm (66\")   Wt 68 kg (150 lb)   SpO2 99% Comment: RA  BMI " 24.21 kg/m²   Physical Exam  Constitutional:       General: She is not in acute distress.     Appearance: Normal appearance.   Pulmonary:      Breath sounds: No wheezing.   Neurological:      Mental Status: She is alert.        Result Review  Data Reviewed:    XR Chest 2 View    Result Date: 7/9/2024  Impression: 1. Ill-defined mild opacities both upper lungs right greater than left may be pleural plaques. CT chest is recommended for better visualization.  This report was signed and finalized on 7/9/2024 1:01 PM by Jj Fowler.       PFT Values          5/23/2023    16:00 7/9/2024    11:30   Pre Drug PFT Results   FVC 97 95   FEV1 96 97   FEF 25-75% 108 121   FEV1/ 82.99   Other Tests PFT Results   DLCO 91    D/VAsb 103              Examination. CT CHEST WO CONTRAST 11/12/2021 4:07 PM  History: History of multiple lung nodules.  DLP: 183 mGycm.  The CT scan of the chest are performed without intravenous contrast  enhancement. The images are acquired in axial plane and subsequent  reconstruction in coronal and sagittal plane.  The comparison is made with the previous study dated 11/16/2020.  An ill-defined, irregular shaped nodular infiltrate in the right upper  lobe laterally, image #31 in axial plane, is stable and unchanged. It  probably represent a chronic process.  An interstitial infiltrate at the right base close to the right  diaphragm, image #87 axial plane, surrounding the moderately dilated  bronchioles is unchanged. This represent a chronic process.  Fibronodular changes in the lung apices, left more than the right are  unchanged and represent a chronic pleural parenchymal inflammatory  process. Loosely scattered nodules/nodular infiltrate in the left  upper lung laterally, image #28 through 36 is unchanged and represent  a chronic process.  No discrete dominant noncalcified nodule is identified.  The limited visualized soft tissues of the neck are unremarkable. The  thyroid gland isn't  completely evaluated due to lack of contrast  enhancement. No discrete mass or lymphadenopathy.  No axillary lymphadenopathy.  The nonenlarged mediastinal/paratracheal lymph nodes are similar to  the previous study.  Atheromatous changes thoracic aorta are noted. No aneurysmal  dilatation. Mild atheromatous changes of coronary arteries are similar  to the previous study.  The limited visualized unenhanced liver and spleen are unremarkable.  The limited visualized pancreas and kidneys are unremarkable. The  gallbladder is surgically absent. The adrenal glands are normal.  The images reviewed in bone window show no acute bony abnormality or  bony lesion.  Impression  The scattered areas of nodular infiltrate is stable and  represent chronic inflammatory process. No discrete lung nodules with  features of lung malignancy is noted. No further follow-up for these  nodules is warranted.  Signed by Dr Gloria Danielle    Assessment and Plan    Diagnoses and all orders for this visit:    1. Mild intermittent asthma without complication (Primary)  -     Spirometry  -     XR Chest 2 View; Future    2. POTS (postural orthostatic tachycardia syndrome)    3. Paroxysmal SVT (supraventricular tachycardia)    Continue current inhalers for asthma  Follow up cxr to reassess infiltrate on prior film  Follow up spirometry next year    Follow Up   Return in about 1 year (around 7/9/2025) for spirometry.  Patient was given instructions and counseling regarding her condition or for health maintenance advice. Please see specific information pulled into the AVS if appropriate.    Electronically signed by Khanh Mendoza MD, 7/9/2024, 16:26 CDT

## 2024-07-09 NOTE — PROCEDURES
Spirometry    Performed by: Дмитрий Duncan CMA  Authorized by: Khanh Mendoza MD     Pre Drug % Predicted    FVC: 95%   FEV1: 97%   FEF 25-75%: 121%   FEV1/FVC: 82.99%    Interpretation   Spirometry   Spirometry shows normal results. midflow is normal.  Overall comments: Stable when compared with prior study  Electronically signed by Khanh Mendoza MD, 7/9/2024, 16:24 CDT

## 2024-07-10 ENCOUNTER — TELEPHONE (OUTPATIENT)
Dept: PULMONOLOGY | Facility: CLINIC | Age: 54
End: 2024-07-10
Payer: COMMERCIAL

## 2024-07-10 NOTE — TELEPHONE ENCOUNTER
----- Message from Khanh Mendoza sent at 7/9/2024  4:41 PM CDT -----  Let pt know this looked ok.  Nothing else to do for now.  Keep follow up as planned

## 2024-07-10 NOTE — TELEPHONE ENCOUNTER
Relayed chest xray results to the patient's spouse and he voiced understanding and will inform the patient.

## 2024-08-08 ENCOUNTER — HOSPITAL ENCOUNTER (OUTPATIENT)
Dept: WOMENS IMAGING | Age: 54
Discharge: HOME | End: 2024-08-08
Attending: FAMILY MEDICINE
Payer: COMMERCIAL

## 2024-08-08 VITALS — BODY MASS INDEX: 24.43 KG/M2 | HEIGHT: 66 IN | WEIGHT: 152 LBS

## 2024-08-08 DIAGNOSIS — Z12.31 ENCOUNTER FOR SCREENING MAMMOGRAM FOR MALIGNANT NEOPLASM OF BREAST: ICD-10-CM

## 2024-08-08 PROCEDURE — 77063 BREAST TOMOSYNTHESIS BI: CPT

## 2024-09-03 RX ORDER — MIDODRINE HYDROCHLORIDE 10 MG/1
10 TABLET ORAL
Qty: 270 TABLET | Refills: 3 | Status: SHIPPED | OUTPATIENT
Start: 2024-09-03

## 2024-09-05 RX ORDER — ASPIRIN 81 MG/1
81 TABLET, COATED ORAL DAILY
Qty: 30 TABLET | Refills: 11 | Status: SHIPPED | OUTPATIENT
Start: 2024-09-05

## 2024-10-08 ENCOUNTER — OFFICE VISIT (OUTPATIENT)
Dept: CARDIOLOGY | Facility: CLINIC | Age: 54
End: 2024-10-08
Payer: COMMERCIAL

## 2024-10-08 VITALS
DIASTOLIC BLOOD PRESSURE: 78 MMHG | OXYGEN SATURATION: 100 % | SYSTOLIC BLOOD PRESSURE: 120 MMHG | HEIGHT: 66 IN | HEART RATE: 82 BPM | BODY MASS INDEX: 23.46 KG/M2 | WEIGHT: 146 LBS

## 2024-10-08 DIAGNOSIS — I25.10 NON-OCCLUSIVE CORONARY ARTERY DISEASE: ICD-10-CM

## 2024-10-08 DIAGNOSIS — I47.10 PAROXYSMAL SVT (SUPRAVENTRICULAR TACHYCARDIA): Primary | ICD-10-CM

## 2024-10-08 DIAGNOSIS — G90.A POTS (POSTURAL ORTHOSTATIC TACHYCARDIA SYNDROME): ICD-10-CM

## 2024-10-08 DIAGNOSIS — E78.5 HYPERLIPIDEMIA LDL GOAL <70: ICD-10-CM

## 2024-10-08 PROCEDURE — 99214 OFFICE O/P EST MOD 30 MIN: CPT | Performed by: NURSE PRACTITIONER

## 2024-10-08 PROCEDURE — 93000 ELECTROCARDIOGRAM COMPLETE: CPT | Performed by: NURSE PRACTITIONER

## 2024-10-08 RX ORDER — HYDROCODONE BITARTRATE AND ACETAMINOPHEN 5; 325 MG/1; MG/1
TABLET ORAL
COMMUNITY
Start: 2024-10-01

## 2024-10-08 RX ORDER — ATORVASTATIN CALCIUM 20 MG/1
20 TABLET, FILM COATED ORAL
Qty: 30 TABLET | Refills: 11 | Status: SHIPPED | OUTPATIENT
Start: 2024-10-08

## 2024-10-08 NOTE — PROGRESS NOTES
"Chief Complaint  Non-occlusive CAD (6mo F/U) and PSVT    Subjective          Mariann Portillo presents to [facility] CARDIOLOGY for routine follow-up. She has paroxysmal supraventricular tachycardia, POTS, hyperlipidemia and hypertension. She complains of increased shortness of breath, which she relates to hypotension related to POTS. She continues to report intermittent palpitations as well. She continues to report intermittent dizziness. Patient denies chest pain, syncope, orthopnea, PND, edema or decreased stamina.  Patient denies any signs of bleeding.    Hyperlipidemia  This is a chronic problem. The current episode started more than 1 year ago. The problem is uncontrolled. Recent lipid tests were reviewed and are high. Current antihyperlipidemic treatment includes statins. Risk factors for coronary artery disease include dyslipidemia.     I have reviewed and confirmed the accuracy of the ROS TASNEEM Luis        Objective   Vital Signs:   /78   Pulse 82   Ht 167.6 cm (66\")   Wt 66.2 kg (146 lb)   SpO2 100%   BMI 23.57 kg/m²     Vitals and nursing note reviewed.   Constitutional:       General: Not in acute distress.     Appearance: Normal and healthy appearance. Well-developed, overweight and not in distress. Not diaphoretic.   Eyes:      General: Lids are normal.         Right eye: No discharge.         Left eye: No discharge.      Conjunctiva/sclera: Conjunctivae normal.      Pupils: Pupils are equal, round, and reactive to light.   HENT:      Head: Normocephalic and atraumatic.      Jaw: There is normal jaw occlusion.      Right Ear: External ear normal.      Left Ear: External ear normal.      Nose: Nose normal.   Neck:      Thyroid: No thyromegaly.      Vascular: No carotid bruit, JVD or JVR. JVD normal.      Trachea: Trachea normal. No tracheal deviation.   Pulmonary:      Effort: Pulmonary effort is normal. No respiratory distress.      Breath sounds: Normal breath " sounds. No decreased breath sounds. No wheezing. No rhonchi. No rales.   Chest:      Chest wall: Not tender to palpatation.   Cardiovascular:      PMI at left midclavicular line. Normal rate. Regular rhythm. Normal S1. Normal S2.       Murmurs: There is no murmur.      No gallop.  No click. No rub.   Pulses:     Intact distal pulses. No decreased pulses.   Edema:     Peripheral edema absent.   Abdominal:      General: Bowel sounds are normal. There is no distension.      Palpations: Abdomen is soft.      Tenderness: There is no abdominal tenderness.   Musculoskeletal: Normal range of motion.         General: No tenderness or deformity.      Cervical back: Normal range of motion and neck supple. Skin:     General: Skin is warm and dry.      Coloration: Skin is not pale.      Findings: No erythema or rash.   Neurological:      General: No focal deficit present.      Mental Status: Alert, oriented to person, place, and time and oriented to person, place and time.   Psychiatric:         Attention and Perception: Attention and perception normal.         Mood and Affect: Mood and affect normal.         Speech: Speech normal.         Behavior: Behavior normal.         Thought Content: Thought content normal.         Cognition and Memory: Cognition and memory normal.         Judgment: Judgment normal.        Result Review :   The following data was reviewed by: TASNEEM Luis on 10/08/2024:      Data reviewed: Cardiology studies holter monitor 11/8/20, 2d echo 11/4/20 and CT angiogram of the coronary arteries 7/21/2023    ECG 12 Lead    Date/Time: 10/8/2024 4:09 PM  Performed by: Taniya Miner APRN    Authorized by: Taniya Miner APRN  Comparison: compared with previous ECG from 3/4/2024  Rhythm: sinus rhythm  Rate: normal  BPM: 82  QRS axis: normal    Clinical impression: normal ECG            Assessment and Plan    Diagnoses and all orders for this visit:    1. Paroxysmal SVT (supraventricular  tachycardia) (HCC) (Primary)-2 runs with longest duration of 6 beats on 14-day Holter monitor 10/7/2020.  Stable.  Atenolol was recently changed to propranolol per neurology. Pt states palpitations and dizziness are worse since changing medications. She is following up with her neurologist regarding this in the near future.     2. POTS (postural orthostatic tachycardia syndrome)- no recent syncopal episodes.  Stable.  Continue midodrine for now. Pt is following in [place] with Dr. Lundberg with neurology. She is scheduled to see him again in January 2025.    4.  Nonocclusive coronary artery disease-30 to 40% mid LAD and 50% mid RCA stenosis on CT angiogram of the coronary arteries 7/21/2023.  Normal CT FFR. Continue aspirin.     5. Hyperlipidemia LDL goal >70- LDL outside of goal range at 99 7/24. Increase Lipitor to 20 mg daily. Recheck lipid panel in three months per PCP.     Follow Up   Return in about 1 year (around 10/8/2025) for Next scheduled follow up.  Patient was given instructions and counseling regarding her condition or for health maintenance advice. Please see specific information pulled into the AVS if appropriate.

## 2024-11-07 RX ORDER — FLUDROCORTISONE ACETATE 0.1 MG/1
0.2 TABLET ORAL DAILY
Qty: 60 TABLET | Refills: 3 | Status: SHIPPED | OUTPATIENT
Start: 2024-11-07

## 2024-12-06 ENCOUNTER — TRANSCRIBE ORDERS (OUTPATIENT)
Dept: PHYSICAL THERAPY | Facility: HOSPITAL | Age: 54
End: 2024-12-06
Payer: COMMERCIAL

## 2024-12-06 DIAGNOSIS — R15.9 FECAL SOILING DUE TO FECAL INCONTINENCE: Primary | ICD-10-CM

## 2025-01-29 PROCEDURE — 0202U NFCT DS 22 TRGT SARS-COV-2: CPT | Performed by: NURSE PRACTITIONER

## 2025-03-10 RX ORDER — FLUDROCORTISONE ACETATE 0.1 MG/1
0.2 TABLET ORAL DAILY
Qty: 60 TABLET | Refills: 3 | Status: SHIPPED | OUTPATIENT
Start: 2025-03-10

## 2025-03-22 ENCOUNTER — HOSPITAL ENCOUNTER (EMERGENCY)
Facility: HOSPITAL | Age: 55
Discharge: HOME | End: 2025-03-22
Payer: COMMERCIAL

## 2025-03-22 ENCOUNTER — APPOINTMENT (OUTPATIENT)
Dept: GENERAL RADIOLOGY | Facility: HOSPITAL | Age: 55
End: 2025-03-22
Payer: COMMERCIAL

## 2025-03-22 VITALS
BODY MASS INDEX: 23.14 KG/M2 | HEIGHT: 66 IN | RESPIRATION RATE: 18 BRPM | SYSTOLIC BLOOD PRESSURE: 132 MMHG | WEIGHT: 144 LBS | OXYGEN SATURATION: 97 % | TEMPERATURE: 97.7 F | DIASTOLIC BLOOD PRESSURE: 79 MMHG | HEART RATE: 75 BPM

## 2025-03-22 DIAGNOSIS — R07.81 PLEURITIC PAIN: ICD-10-CM

## 2025-03-22 DIAGNOSIS — R07.9 CHEST PAIN, UNSPECIFIED TYPE: Primary | ICD-10-CM

## 2025-03-22 DIAGNOSIS — Z86.79 HISTORY OF CAD (CORONARY ARTERY DISEASE): ICD-10-CM

## 2025-03-22 LAB
ALBUMIN SERPL-MCNC: 4.2 G/DL (ref 3.5–5.2)
ALBUMIN/GLOB SERPL: 1.3 G/DL
ALP SERPL-CCNC: 105 U/L (ref 39–117)
ALT SERPL W P-5'-P-CCNC: 18 U/L (ref 1–33)
ANION GAP SERPL CALCULATED.3IONS-SCNC: 11 MMOL/L (ref 5–15)
AST SERPL-CCNC: 23 U/L (ref 1–32)
BASOPHILS # BLD AUTO: 0.14 10*3/MM3 (ref 0–0.2)
BASOPHILS NFR BLD AUTO: 1.4 % (ref 0–1.5)
BILIRUB SERPL-MCNC: 0.4 MG/DL (ref 0–1.2)
BUN SERPL-MCNC: 10 MG/DL (ref 6–20)
BUN/CREAT SERPL: 14.3 (ref 7–25)
CALCIUM SPEC-SCNC: 9.9 MG/DL (ref 8.6–10.5)
CHLORIDE SERPL-SCNC: 102 MMOL/L (ref 98–107)
CO2 SERPL-SCNC: 27 MMOL/L (ref 22–29)
CREAT SERPL-MCNC: 0.7 MG/DL (ref 0.57–1)
CRP SERPL-MCNC: <0.3 MG/DL (ref 0–0.5)
D DIMER PPP FEU-MCNC: 0.51 MCGFEU/ML (ref 0–0.52)
DEPRECATED RDW RBC AUTO: 41.9 FL (ref 37–54)
EGFRCR SERPLBLD CKD-EPI 2021: 104.2 ML/MIN/1.73
EOSINOPHIL # BLD AUTO: 0.45 10*3/MM3 (ref 0–0.4)
EOSINOPHIL NFR BLD AUTO: 4.4 % (ref 0.3–6.2)
ERYTHROCYTE [DISTWIDTH] IN BLOOD BY AUTOMATED COUNT: 12.1 % (ref 12.3–15.4)
ERYTHROCYTE [SEDIMENTATION RATE] IN BLOOD: 35 MM/HR (ref 0–30)
GEN 5 1HR TROPONIN T REFLEX: <6 NG/L
GLOBULIN UR ELPH-MCNC: 3.2 GM/DL
GLUCOSE SERPL-MCNC: 73 MG/DL (ref 65–99)
HCT VFR BLD AUTO: 38.8 % (ref 34–46.6)
HGB BLD-MCNC: 12.8 G/DL (ref 12–15.9)
IMM GRANULOCYTES # BLD AUTO: 0.03 10*3/MM3 (ref 0–0.05)
IMM GRANULOCYTES NFR BLD AUTO: 0.3 % (ref 0–0.5)
LYMPHOCYTES # BLD AUTO: 2.68 10*3/MM3 (ref 0.7–3.1)
LYMPHOCYTES NFR BLD AUTO: 26 % (ref 19.6–45.3)
MCH RBC QN AUTO: 30.8 PG (ref 26.6–33)
MCHC RBC AUTO-ENTMCNC: 33 G/DL (ref 31.5–35.7)
MCV RBC AUTO: 93.5 FL (ref 79–97)
MONOCYTES # BLD AUTO: 0.69 10*3/MM3 (ref 0.1–0.9)
MONOCYTES NFR BLD AUTO: 6.7 % (ref 5–12)
NEUTROPHILS NFR BLD AUTO: 6.31 10*3/MM3 (ref 1.7–7)
NEUTROPHILS NFR BLD AUTO: 61.2 % (ref 42.7–76)
NRBC BLD AUTO-RTO: 0 /100 WBC (ref 0–0.2)
NT-PROBNP SERPL-MCNC: 158.5 PG/ML (ref 0–900)
PLATELET # BLD AUTO: 367 10*3/MM3 (ref 140–450)
PMV BLD AUTO: 8.8 FL (ref 6–12)
POTASSIUM SERPL-SCNC: 3.9 MMOL/L (ref 3.5–5.2)
PROT SERPL-MCNC: 7.4 G/DL (ref 6–8.5)
RBC # BLD AUTO: 4.15 10*6/MM3 (ref 3.77–5.28)
SODIUM SERPL-SCNC: 140 MMOL/L (ref 136–145)
TROPONIN T NUMERIC DELTA: NORMAL
TROPONIN T SERPL HS-MCNC: <6 NG/L
WBC NRBC COR # BLD AUTO: 10.3 10*3/MM3 (ref 3.4–10.8)

## 2025-03-22 PROCEDURE — 85652 RBC SED RATE AUTOMATED: CPT | Performed by: PHYSICIAN ASSISTANT

## 2025-03-22 PROCEDURE — 85025 COMPLETE CBC W/AUTO DIFF WBC: CPT | Performed by: PHYSICIAN ASSISTANT

## 2025-03-22 PROCEDURE — 84484 ASSAY OF TROPONIN QUANT: CPT | Performed by: PHYSICIAN ASSISTANT

## 2025-03-22 PROCEDURE — 80053 COMPREHEN METABOLIC PANEL: CPT | Performed by: PHYSICIAN ASSISTANT

## 2025-03-22 PROCEDURE — 93010 ELECTROCARDIOGRAM REPORT: CPT | Performed by: STUDENT IN AN ORGANIZED HEALTH CARE EDUCATION/TRAINING PROGRAM

## 2025-03-22 PROCEDURE — 85379 FIBRIN DEGRADATION QUANT: CPT | Performed by: PHYSICIAN ASSISTANT

## 2025-03-22 PROCEDURE — 93005 ELECTROCARDIOGRAM TRACING: CPT | Performed by: EMERGENCY MEDICINE

## 2025-03-22 PROCEDURE — 99284 EMERGENCY DEPT VISIT MOD MDM: CPT

## 2025-03-22 PROCEDURE — 71045 X-RAY EXAM CHEST 1 VIEW: CPT

## 2025-03-22 PROCEDURE — 36415 COLL VENOUS BLD VENIPUNCTURE: CPT

## 2025-03-22 PROCEDURE — 86140 C-REACTIVE PROTEIN: CPT | Performed by: PHYSICIAN ASSISTANT

## 2025-03-22 PROCEDURE — 83880 ASSAY OF NATRIURETIC PEPTIDE: CPT | Performed by: PHYSICIAN ASSISTANT

## 2025-03-22 RX ORDER — INDOMETHACIN 25 MG/1
25 CAPSULE ORAL ONCE
Status: COMPLETED | OUTPATIENT
Start: 2025-03-22 | End: 2025-03-22

## 2025-03-22 RX ORDER — NITROGLYCERIN 0.4 MG/1
0.4 TABLET SUBLINGUAL
Status: DISCONTINUED | OUTPATIENT
Start: 2025-03-22 | End: 2025-03-22 | Stop reason: HOSPADM

## 2025-03-22 RX ORDER — INDOMETHACIN 25 MG/1
25 CAPSULE ORAL
Qty: 21 CAPSULE | Refills: 0 | Status: SHIPPED | OUTPATIENT
Start: 2025-03-22

## 2025-03-22 RX ORDER — SODIUM CHLORIDE 0.9 % (FLUSH) 0.9 %
10 SYRINGE (ML) INJECTION AS NEEDED
Status: DISCONTINUED | OUTPATIENT
Start: 2025-03-22 | End: 2025-03-22 | Stop reason: HOSPADM

## 2025-03-22 RX ADMIN — INDOMETHACIN 25 MG: 25 CAPSULE ORAL at 18:34

## 2025-03-22 RX ADMIN — NITROGLYCERIN 0.4 MG: 0.4 TABLET SUBLINGUAL at 17:49

## 2025-03-22 NOTE — ED PROVIDER NOTES
Subjective   History of Present Illness  Patient is a pleasant 54-year-old female who presents to ER due to chest pain.    Patient does have significant medical history of POTS, coronary artery calcifications noted on CTA coronary in 2023, hyperlipidemia, asthma.    Patient describes that 4 days ago, she developed the onset discomfort in her right breast going through and through to her chest and radiating up to the right side of her neck.  She did have shortness of breath associated with it initially.  The shortness of breath subsided.  Denies any associated diaphoresis, nausea, near-syncope or syncope.  She denies any palpitations.  She does have a history of POTS.  The patient takes aspirin daily.  She noted exertion, bending over, and lying supine does make it worse.  She denies any recent dental procedures, surgeries, or prolonged travels.  Denies any fever illnesses.  Her pain currently is a 6 out of 10.  At its worst, is an 8 out of 10.  She does not smoke.  She denies a history of hypertension.  She does have strong family history of particular paternal grandmother and paternal grandfather.  Paternal grandfather had a fatal MI in his 40s.    Patient denies any leg pain, cramping, or swelling.  Denies a history of DVT or PE.  She denies a history of congestive heart failure.      Review of Systems   Constitutional:  Positive for activity change. Negative for unexpected weight change.   HENT: Negative.     Respiratory:  Positive for chest tightness and shortness of breath.    Cardiovascular:  Positive for chest pain. Negative for palpitations and leg swelling.   Gastrointestinal:  Positive for nausea.   Genitourinary: Negative.    Musculoskeletal: Negative.    Neurological: Negative.    Psychiatric/Behavioral: Negative.         Past Medical History:   Diagnosis Date    Allergic rhinitis     Asthma     Asthma, extrinsic diagnosed between 2000 & 2003    do not know if extrinsic or intrinsic    Asthma, intrinsic  diagnosed between  &     do not know if extrinsic or intrinsic    Chronic UTI     Coronary artery calcification seen on CT scan 2023    Endometriosis     Fibromyalgia     High cholesterol     Hypotension     IBS (irritable bowel syndrome)     Lung nodule 2018 or 2019    ground-glass opacity, right lung    Meniere disease     Migraines     POTS (postural orthostatic tachycardia syndrome)     Tachycardia        Allergies   Allergen Reactions    Decongestant [Pseudoephedrine] Shortness Of Breath and Swelling     Per patient she is allergic to Actifed and anything like that.     Codimal-A [Brompheniramine] Itching    Levaquin [Levofloxacin] Itching     Per patient    Percocet [Oxycodone-Acetaminophen] Nausea And Vomiting       Past Surgical History:   Procedure Laterality Date    CHOLECYSTECTOMY      CYST REMOVAL      ENDOMETRIAL BIOPSY      ERCP WITH SPHINCTEROTOMY/PAPILLOTOMY  2024    ERCP WITH SPHINCTEROTOMY/PAPILLOTOMY  2024       Family History   Problem Relation Age of Onset    Cancer Mother         breast cancer    Breast cancer Mother 58    Heart disease Father         Details unknown.    Heart attack Paternal Grandfather          in sleep at age 40.    Hyperlipidemia Brother         LDL high, HDL extremely low.       Social History     Socioeconomic History    Marital status:    Tobacco Use    Smoking status: Never     Passive exposure: Past    Smokeless tobacco: Never   Vaping Use    Vaping status: Never Used   Substance and Sexual Activity    Alcohol use: No    Drug use: Never    Sexual activity: Yes     Partners: Male     Birth control/protection: None       Prior to Admission medications    Medication Sig Start Date End Date Taking? Authorizing Provider   albuterol sulfate  (90 Base) MCG/ACT inhaler Inhale 2 puffs Every 4 (Four) Hours. Rinse mouth after use. 12/15/23   Liberty Jones APRN   Aspirin Low Dose 81 MG EC tablet TAKE 1 TABLET BY MOUTH  DAILY. 9/5/24   Taniya Miner APRN   atorvastatin (LIPITOR) 20 MG tablet Take 1 tablet by mouth every night at bedtime. 10/8/24   Taniya Miner APRN   famotidine (PEPCID) 40 MG tablet Daily. 7/18/24   Jeanine Corbin MD   fludrocortisone 0.1 MG tablet TAKE 2 TABLETS BY MOUTH DAILY. 3/10/25   Taniya Miner APRN   imipramine (TOFRANIL) 25 MG tablet Take 2 tablets by mouth Every Night. 6/24/19   Jeanine Corbin MD   metFORMIN (GLUCOPHAGE) 850 MG tablet 1 tablet 3 (Three) Times a Day. 6/24/19   Jeanine Corbin MD   methylPREDNISolone (MEDROL) 4 MG dose pack Take as directed on package instructions. 1/29/25   Caitie Lebron APRN   midodrine (PROAMATINE) 10 MG tablet TAKE 1 TABLET BY MOUTH 3 (THREE) TIMES A DAY BEFORE MEALS. 9/3/24   Taniya Miner APRN   nitrofurantoin (MACRODANTIN) 100 MG capsule Take 1 capsule by mouth As Needed.    Jeanine Corbin MD   ondansetron ODT (ZOFRAN-ODT) 4 MG disintegrating tablet DISSOLVE 1 TABLET ON THE TONGUE AND ALLOW TO DISSOLVE 7/23/24   Jeanine Corbin MD   PB-Hyoscy-Atropine-Scopolamine (DONNATAL) 16.2 MG per tablet As Needed. 7/25/22   Jeanine Corbin MD   pimecrolimus (ELIDEL) 1 % cream TRINITY ON THE SKIN BID 7/13/19   Jeanine Corbin MD   propranolol (INDERAL) 20 MG tablet Take 1 tablet by mouth 2 (Two) Times a Day. 7/22/24   Jeanine Corbin MD   pyridostigmine (MESTINON) 180 MG CR tablet Take 1 tablet by mouth Daily. 7/2/24   Jeanine Corbin MD   vitamin D (ERGOCALCIFEROL) 1.25 MG (01452 UT) capsule capsule Take 1 capsule by mouth Every 7 (Seven) Days. 7/1/24   Jeanine Corbin MD   zolpidem CR (AMBIEN CR) 6.25 MG CR tablet Every Night. 6/24/19   Jeanine Corbin MD       Medications   sodium chloride 0.9 % flush 10 mL (has no administration in time range)   nitroglycerin (NITROSTAT) SL tablet 0.4 mg (0.4 mg Sublingual Given 3/22/25 1749)   indomethacin (INDOCIN) capsule 25 mg (has no  "administration in time range)       /83   Pulse 76   Temp 97.7 °F (36.5 °C) (Oral)   Resp 18   Ht 167.6 cm (66\")   Wt 65.3 kg (144 lb)   SpO2 95%   BMI 23.24 kg/m²       Objective   Physical Exam  Vitals and nursing note reviewed.   Constitutional:       General: She is not in acute distress.     Appearance: She is well-developed. She is not diaphoretic.   HENT:      Head: Normocephalic and atraumatic.   Eyes:      Conjunctiva/sclera: Conjunctivae normal.      Pupils: Pupils are equal, round, and reactive to light.   Neck:      Trachea: No tracheal deviation.   Cardiovascular:      Rate and Rhythm: Normal rate and regular rhythm.      Heart sounds: Normal heart sounds. No murmur heard.  Pulmonary:      Effort: Pulmonary effort is normal.      Breath sounds: Normal breath sounds. No decreased breath sounds, wheezing or rhonchi.   Abdominal:      General: Bowel sounds are normal. There is no distension.      Palpations: Abdomen is soft. There is no mass.      Tenderness: There is no abdominal tenderness. There is no guarding or rebound.   Musculoskeletal:         General: Normal range of motion.      Cervical back: Normal range of motion and neck supple.      Right lower leg: No tenderness. No edema.      Left lower leg: No tenderness. No edema.   Skin:     General: Skin is warm and dry.   Neurological:      General: No focal deficit present.      Mental Status: She is alert and oriented to person, place, and time.   Psychiatric:         Behavior: Behavior normal.         Thought Content: Thought content normal.         Judgment: Judgment normal.         Procedures         Lab Results (last 24 hours)       Procedure Component Value Units Date/Time    CBC & Differential [[id number]]  (Abnormal) Collected: 03/22/25 1606    Specimen: Blood Updated: 03/22/25 1615    Narrative:      The following orders were created for panel order CBC & Differential.  Procedure                               Abnormality         " Status                     ---------                               -----------         ------                     CBC Auto Differential[[id number]]        Abnormal            Final result                 Please view results for these tests on the individual orders.    Comprehensive Metabolic Panel [[id number]] Collected: 03/22/25 1606    Specimen: Blood Updated: 03/22/25 1635     Glucose 73 mg/dL      BUN 10 mg/dL      Creatinine 0.70 mg/dL      Sodium 140 mmol/L      Potassium 3.9 mmol/L      Chloride 102 mmol/L      CO2 27.0 mmol/L      Calcium 9.9 mg/dL      Total Protein 7.4 g/dL      Albumin 4.2 g/dL      ALT (SGPT) 18 U/L      AST (SGOT) 23 U/L      Alkaline Phosphatase 105 U/L      Total Bilirubin 0.4 mg/dL      Globulin 3.2 gm/dL      A/G Ratio 1.3 g/dL      BUN/Creatinine Ratio 14.3     Anion Gap 11.0 mmol/L      eGFR 104.2 mL/min/1.73     Narrative:      GFR Categories in Chronic Kidney Disease (CKD)      GFR Category          GFR (mL/min/1.73)    Interpretation  G1                     90 or greater         Normal or high (1)  G2                      60-89                Mild decrease (1)  G3a                   45-59                Mild to moderate decrease  G3b                   30-44                Moderate to severe decrease  G4                    15-29                Severe decrease  G5                    14 or less           Kidney failure          (1)In the absence of evidence of kidney disease, neither GFR category G1 or G2 fulfill the criteria for CKD.    eGFR calculation 2021 CKD-EPI creatinine equation, which does not include race as a factor    BNP [[id number]]  (Normal) Collected: 03/22/25 1606    Specimen: Blood Updated: 03/22/25 1633     proBNP 158.5 pg/mL     Narrative:      This assay is used as an aid in the diagnosis of individuals suspected of having heart failure. It can be used as an aid in the diagnosis of acute decompensated heart failure (ADHF) in patients presenting with signs and  symptoms of ADHF to the emergency department (ED). In addition, NT-proBNP of <300 pg/mL indicates ADHF is not likely.    Age Range Result Interpretation  NT-proBNP Concentration (pg/mL:      <50             Positive            >450                   Gray                 300-450                    Negative             <300    50-75           Positive            >900                  Gray                300-900                  Negative            <300      >75             Positive            >1800                  Gray                300-1800                  Negative            <300    High Sensitivity Troponin T [[id number]]  (Normal) Collected: 03/22/25 1606    Specimen: Blood Updated: 03/22/25 1632     HS Troponin T <6 ng/L     Narrative:      High Sensitive Troponin T Reference Range:  <14.0 ng/L- Negative Female for AMI  <22.0 ng/L- Negative Male for AMI  >=14 - Abnormal Female indicating possible myocardial injury.  >=22 - Abnormal Male indicating possible myocardial injury.   Clinicians would have to utilize clinical acumen, EKG, Troponin, and serial changes to determine if it is an Acute Myocardial Infarction or myocardial injury due to an underlying chronic condition.         D-dimer, Quantitative [[id number]]  (Normal) Collected: 03/22/25 1606    Specimen: Blood Updated: 03/22/25 1627     D-Dimer, Quantitative 0.51 MCGFEU/mL     Narrative:      According to the assay 's published package insert, a normal (<0.50 MCGFEU/mL) D-dimer result in conjunction with a non-high clinical probability assessment, excludes deep vein thrombosis (DVT) and pulmonary embolism (PE) with high sensitivity.    D-dimer values increase with age and this can make VTE exclusion of an older population difficult. To address this, the [employer], based on best available evidence and recent guidelines, recommends that clinicians use age-adjusted D-dimer thresholds in patients greater than 50 years of age  "with: a) a low probability of PE who do not meet all Pulmonary Embolism Rule Out Criteria, or b) in those with intermediate probability of PE.   The formula for an age-adjusted D-dimer cut-off is \"age/100\".  For example, a 60 year old patient would have an age-adjusted cut-off of 0.60 MCGFEU/mL and an 80 year old 0.80 MCGFEU/mL.    CBC Auto Differential [[id number]]  (Abnormal) Collected: 03/22/25 1606    Specimen: Blood Updated: 03/22/25 1615     WBC 10.30 10*3/mm3      RBC 4.15 10*6/mm3      Hemoglobin 12.8 g/dL      Hematocrit 38.8 %      MCV 93.5 fL      MCH 30.8 pg      MCHC 33.0 g/dL      RDW 12.1 %      RDW-SD 41.9 fl      MPV 8.8 fL      Platelets 367 10*3/mm3      Neutrophil % 61.2 %      Lymphocyte % 26.0 %      Monocyte % 6.7 %      Eosinophil % 4.4 %      Basophil % 1.4 %      Immature Grans % 0.3 %      Neutrophils, Absolute 6.31 10*3/mm3      Lymphocytes, Absolute 2.68 10*3/mm3      Monocytes, Absolute 0.69 10*3/mm3      Eosinophils, Absolute 0.45 10*3/mm3      Basophils, Absolute 0.14 10*3/mm3      Immature Grans, Absolute 0.03 10*3/mm3      nRBC 0.0 /100 WBC     Sedimentation Rate [[id number]]  (Abnormal) Collected: 03/22/25 1606    Specimen: Blood Updated: 03/22/25 1715     Sed Rate 35 mm/hr     C-reactive Protein [[id number]]  (Normal) Collected: 03/22/25 1606    Specimen: Blood Updated: 03/22/25 1813     C-Reactive Protein <0.30 mg/dL     High Sensitivity Troponin T 1Hr [[id number]] Collected: 03/22/25 1710    Specimen: Blood Updated: 03/22/25 1741     HS Troponin T <6 ng/L      Troponin T Numeric Delta --     Comment: Unable to calculate.       Narrative:      High Sensitive Troponin T Reference Range:  <14.0 ng/L- Negative Female for AMI  <22.0 ng/L- Negative Male for AMI  >=14 - Abnormal Female indicating possible myocardial injury.  >=22 - Abnormal Male indicating possible myocardial injury.   Clinicians would have to utilize clinical acumen, EKG, Troponin, and serial changes to determine if it is " an Acute Myocardial Infarction or myocardial injury due to an underlying chronic condition.                 XR Chest 1 View  Result Date: 3/22/2025  Narrative: EXAM/TECHNIQUE: XR CHEST 1 VW-  INDICATION: Chest pain  COMPARISON: 7/9/2024  FINDINGS:  The cardiac silhouette is within normal limits.  No pleural effusion or pneumothorax. No focal consolidation.  Right upper quadrant cholecystectomy clips. No acute osseous finding.      Impression:  No acute findings.   This report was signed and finalized on 3/22/2025 4:47 PM by Dr. Sebastián Bills MD.        ED Course  ED Course as of 03/22/25 1824   Sat Mar 22, 2025   1817 Patient has been reassessed and she reports improvement after receiving a dose of nitroglycerin.  I have educated her of the differential and the test results.  She understands that the high-sensitivity troponin x 2, EKG x 2, chest x-ray, CRP, BNP, and D-dimer all negative.  No evidence of leukocytosis.  Sed rate was slightly elevated at 35 but her CRP and troponins were normal.  She understands why these tests were ordered as well as why had ordered indomethacin originally.  CTA coronary has already been reviewed.  The patient does have worsening pain with deep inspiration as well and it sounds more pleuritic.  I do recommend the patient follow-up with Dr. Arguelles, her cardiologist.  Continue aspirin 81 mg daily as well as blood pressure and cholesterol control.  Strict return precautions advised.  Patient voiced understanding she will be discharged in stable condition. [TK]      ED Course User Index  [TK] Clau Lemon PA        Previous CTA coronary has been reviewed:    CT Coronary FFR CTA Data JOEY & GNRJ Estimated FFR Model [[id number]] (Order [id number])  Order  Status: Final result     Patient Location    Patient Class Location   Emergency [facility] EMERGENCY DEPT, 23, 23     [phone]     Appointment Information    PACS Images     Radiology Images  Study Result    Narrative &  Impression   Reference CTA report as below     Cardiac CT angiography  7/21/2023     Impression:      Total calcium score (using QUINONES calcium score calculator): 407.2  LAD 36 and .1  This is markedly elevated and at the 99th percentile for matched population     Coronary CT angiogram     No significant disease of left main coronary artery  Focal calcification of proximal left anterior descending coronary artery  Mid left anterior descending coronary artery has 30 to 40% stenosis with soft plaque  No significant disease of diagonal branches  Mild atherosclerotic changes of the left circumflex coronary artery  No obstructive disease of left circumflex coronary artery noted  Focal calcification of right coronary artery  Mid right coronary artery has eccentric 50% stenosis due to soft plaque     MDM  Number of Diagnoses or Management Options  Chest pain, unspecified type  Pleuritic pain  Diagnosis management comments: Differential Diagnosis:  I considered chest wall pain, muscle strain, costochondritis, pleurisy, rib fracture, herpes zoster, cardiovascular etiology, myocardial infarction, intermediate coronary syndrome, unstable angina, angina, aortic dissection, pericarditis, pulmonary etiology, pulmonary embolism, pneumonia, pneumothorax, lung cancer, gastroesophageal reflux disease, esophagitis, esophageal spasm and gastrointestinal etiology as a possible cause of chest pain in this patient. This is a partial list of diagnoses considered.            Amount and/or Complexity of Data Reviewed  Clinical lab tests: reviewed  Tests in the radiology section of CPT®: reviewed  Tests in the medicine section of CPT®: reviewed  Decide to obtain previous medical records or to obtain history from someone other than the patient: yes        Final diagnoses:   Chest pain, unspecified type   Pleuritic pain   History of CAD (coronary artery disease)       Disposition: Patient will be discharged in stable condition.       King  JADIEL Mcfadden  03/22/25 1829

## 2025-03-23 LAB
QT INTERVAL: 362 MS
QTC INTERVAL: 417 MS

## 2025-06-18 ENCOUNTER — HOSPITAL ENCOUNTER (OUTPATIENT)
Dept: GENERAL RADIOLOGY | Facility: HOSPITAL | Age: 55
Discharge: HOME | End: 2025-06-18
Admitting: FAMILY MEDICINE
Payer: COMMERCIAL

## 2025-06-18 ENCOUNTER — TRANSCRIBE ORDERS (OUTPATIENT)
Dept: ADMINISTRATIVE | Facility: HOSPITAL | Age: 55
End: 2025-06-18
Payer: COMMERCIAL

## 2025-06-18 DIAGNOSIS — M25.551 PAIN IN RIGHT HIP: Primary | ICD-10-CM

## 2025-06-18 DIAGNOSIS — M25.551 PAIN IN RIGHT HIP: ICD-10-CM

## 2025-06-18 PROCEDURE — 73502 X-RAY EXAM HIP UNI 2-3 VIEWS: CPT

## 2025-07-04 ENCOUNTER — TELEPHONE (OUTPATIENT)
Dept: PULMONOLOGY | Facility: CLINIC | Age: 55
End: 2025-07-04
Payer: COMMERCIAL

## 2025-07-04 DIAGNOSIS — R91.8 LUNG INFILTRATE: ICD-10-CM

## 2025-07-04 DIAGNOSIS — J45.20 MILD INTERMITTENT ASTHMA WITHOUT COMPLICATION: Primary | ICD-10-CM

## 2025-07-04 NOTE — TELEPHONE ENCOUNTER
Patient sent My chart message asking if she needs an chest x ray before her appointment on 7/10.       Patient aware you are out of office until Monday.

## 2025-07-07 ENCOUNTER — HOSPITAL ENCOUNTER (OUTPATIENT)
Dept: GENERAL RADIOLOGY | Facility: HOSPITAL | Age: 55
Discharge: HOME | End: 2025-07-07
Admitting: INTERNAL MEDICINE
Payer: COMMERCIAL

## 2025-07-07 DIAGNOSIS — R91.8 LUNG INFILTRATE: ICD-10-CM

## 2025-07-07 DIAGNOSIS — J45.20 MILD INTERMITTENT ASTHMA WITHOUT COMPLICATION: ICD-10-CM

## 2025-07-07 PROCEDURE — 71046 X-RAY EXAM CHEST 2 VIEWS: CPT

## 2025-07-09 RX ORDER — FLUDROCORTISONE ACETATE 0.1 MG/1
TABLET ORAL DAILY
Qty: 60 TABLET | Refills: 3 | Status: SHIPPED | OUTPATIENT
Start: 2025-07-09

## 2025-07-10 ENCOUNTER — OFFICE VISIT (OUTPATIENT)
Dept: PULMONOLOGY | Facility: CLINIC | Age: 55
End: 2025-07-10
Payer: COMMERCIAL

## 2025-07-10 VITALS
HEIGHT: 66 IN | SYSTOLIC BLOOD PRESSURE: 124 MMHG | HEART RATE: 74 BPM | OXYGEN SATURATION: 99 % | WEIGHT: 147 LBS | BODY MASS INDEX: 23.63 KG/M2 | DIASTOLIC BLOOD PRESSURE: 72 MMHG

## 2025-07-10 DIAGNOSIS — J45.20 MILD INTERMITTENT ASTHMA WITHOUT COMPLICATION: Primary | ICD-10-CM

## 2025-07-10 DIAGNOSIS — R91.8 LUNG INFILTRATE: ICD-10-CM

## 2025-07-10 RX ORDER — MOMETASONE FUROATE 1 MG/G
1 CREAM TOPICAL DAILY
COMMUNITY

## 2025-07-10 RX ORDER — ALBUTEROL SULFATE AND BUDESONIDE 90; 80 UG/1; UG/1
2 AEROSOL, METERED RESPIRATORY (INHALATION)
Qty: 10.7 G | Refills: 11 | Status: SHIPPED | OUTPATIENT
Start: 2025-07-10

## 2025-07-10 RX ORDER — PHENAZOPYRIDINE HYDROCHLORIDE 100 MG/1
100 TABLET, FILM COATED ORAL 3 TIMES DAILY PRN
COMMUNITY

## 2025-07-10 NOTE — PROCEDURES
Spirometry    Performed by: Alexandra Heredia, RRT  Authorized by: Khanh Mendoza MD     Pre Drug % Predicted    FVC: 84%   FEV1: 84%   FEF 25-75%: 92%   FEV1/FVC: 80%    Interpretation   Spirometry   Spirometry shows normal results. midflow is normal.  Overall comments: Fev1 and fvc both slightly lower than on prior study, although stlll normal  Electronically signed by Khanh Mendoza MD, 7/10/2025, 21:32 CDT

## 2025-07-10 NOTE — PROGRESS NOTES
Background:  Pt w multiple lung nodules 2018, asthma, POTS   Chief Complaint  Shortness of Breath    Subjective    History of Present Illness     Mariann Portillo is here for follow up with [facility].  History of Present Illness  Asthma has bothered her more this year.  She uses the inhaler a couple of times a week.  She had Covid, rsv, and rhinovirus exposure in March and had been sick.     Tobacco Use: Low Risk  (7/10/2025)    Patient History     Smoking Tobacco Use: Never     Smokeless Tobacco Use: Never     Passive Exposure: Past      Current Outpatient Medications   Medication Instructions    albuterol sulfate  (90 Base) MCG/ACT inhaler 2 puffs, Inhalation, Every 4 Hours, Rinse mouth after use.    Albuterol-Budesonide (Airsupra) 90-80 MCG/ACT aerosol 2 puffs, Inhalation, 6 Times Daily    Aspirin Low Dose 81 mg, Oral, Daily    atorvastatin (LIPITOR) 20 mg, Oral, Every Night at Bedtime    clindamycin (CLEOCIN) 300 mg, Oral, 3 Times Daily    famotidine (PEPCID) 40 MG tablet Every 24 Hours    fexofenadine (ALLEGRA ALLERGY) 180 mg, Oral, Daily    fludrocortisone 0.1 MG tablet Oral, Daily    imipramine (TOFRANIL) 50 mg, Nightly    metFORMIN (GLUCOPHAGE) 850 mg, 3 Times Daily    midodrine (PROAMATINE) 10 mg, Oral, 3 Times Daily Before Meals    mometasone (ELOCON) 0.1 % cream 1 Application, Daily    mupirocin (BACTROBAN) 2 % ointment 1 Application, Topical, 3 Times Daily    ondansetron ODT (ZOFRAN-ODT) 4 MG disintegrating tablet DISSOLVE 1 TABLET ON THE TONGUE AND ALLOW TO DISSOLVE    PB-Hyoscy-Atropine-Scopolamine (DONNATAL) 16.2 MG per tablet As Needed    phenazopyridine (PYRIDIUM) 100 mg, 3 Times Daily PRN    pimecrolimus (ELIDEL) 1 % cream TRINITY ON THE SKIN BID    propranolol (INDERAL) 20 mg, 2 Times Daily    pyridostigmine (MESTINON) 180 mg, Daily    vitamin D (ERGOCALCIFEROL) 50,000 Units, Every 7 Days    zolpidem CR (AMBIEN CR) 6.25 MG CR tablet Nightly     "  Objective     Vital Signs:   /72   Pulse 74   Ht 166.4 cm (65.5\")   Wt 66.7 kg (147 lb)   SpO2 99% Comment: RA  BMI 24.09 kg/m²   Physical Exam  Constitutional:       Appearance: She is not ill-appearing or toxic-appearing.   Pulmonary:      Breath sounds: No wheezing.        Result Review  Data Reviewed:    XR Chest 2 View  Result Date: 7/7/2025  Impression: 1. Patchy opacities in both upper lobes may represent multifocal pneumonia. A short interval follow-up chest x-ray in 1 month is recommended to ensure resolution.  This report was signed and finalized on 7/7/2025 4:53 PM by Jj Fowler.      XR Hip With or Without Pelvis 2 - 3 View Right  Result Date: 6/18/2025  Impression: 1.  Unremarkable exam.   This report was signed and finalized on 6/18/2025 4:02 PM by Jj Fowler.        PFT Values          7/9/2024    11:30 7/10/2025    13:45   Pre Drug PFT Results   FVC 95 84   FEV1 97 84   FEF 25-75% 121 92   FEV1/FVC 82.99 80                Assessment and Plan    Diagnoses and all orders for this visit:    1. Mild intermittent asthma without complication (Primary)  -     Spirometry  -     Albuterol-Budesonide (Airsupra) 90-80 MCG/ACT aerosol; Inhale 2 puffs 6 (Six) Times a Day.  Dispense: 10.7 g; Refill: 11    2. Lung infiltrate  -     XR Chest 2 View; Future    Lung infiltrates likely were residual from viral illness  Will repeat cxr in a month and follow up by phone regarding this.  PFT down mildly but still normal, might be spurious or might relate to increased asthma symptoms over past few weeks.  Currently not symptomatic enough to warrant tx for persistent asthma.  Will change albuterol to airsupra.  Recheck PFT in a few months to evaluate for any progressive decline.  Call prn increased asthma symptoms not easily controlled with airsupra      Follow Up   Return in about 6 months (around 1/10/2026) for spirometry.  Patient was given instructions and counseling regarding her condition or " for health maintenance advice. Please see specific information pulled into the AVS if appropriate.    Electronically signed by Khanh Mendoza MD, 7/10/2025, 20:57 CDT

## 2025-08-26 ENCOUNTER — TELEPHONE (OUTPATIENT)
Dept: PULMONOLOGY | Facility: CLINIC | Age: 55
End: 2025-08-26
Payer: COMMERCIAL